# Patient Record
Sex: MALE | Race: WHITE | NOT HISPANIC OR LATINO | Employment: OTHER | RURAL
[De-identification: names, ages, dates, MRNs, and addresses within clinical notes are randomized per-mention and may not be internally consistent; named-entity substitution may affect disease eponyms.]

---

## 2017-08-17 RX ORDER — ATORVASTATIN CALCIUM 10 MG/1
10 TABLET, FILM COATED ORAL NIGHTLY
Qty: 30 TABLET | Refills: 1 | Status: SHIPPED | OUTPATIENT
Start: 2017-08-17 | End: 2017-10-23 | Stop reason: SDUPTHER

## 2017-08-24 RX ORDER — METOPROLOL TARTRATE 50 MG/1
50 TABLET, FILM COATED ORAL 2 TIMES DAILY
Qty: 60 TABLET | Refills: 1 | Status: SHIPPED | OUTPATIENT
Start: 2017-08-24 | End: 2017-11-02 | Stop reason: SDUPTHER

## 2017-09-15 ENCOUNTER — OFFICE VISIT (OUTPATIENT)
Dept: FAMILY MEDICINE CLINIC | Facility: CLINIC | Age: 77
End: 2017-09-15

## 2017-09-15 VITALS
HEART RATE: 55 BPM | HEIGHT: 74 IN | TEMPERATURE: 97.6 F | SYSTOLIC BLOOD PRESSURE: 120 MMHG | BODY MASS INDEX: 24.2 KG/M2 | OXYGEN SATURATION: 99 % | WEIGHT: 188.6 LBS | DIASTOLIC BLOOD PRESSURE: 62 MMHG

## 2017-09-15 DIAGNOSIS — Z12.12 SCREENING FOR MALIGNANT NEOPLASM OF THE RECTUM: ICD-10-CM

## 2017-09-15 DIAGNOSIS — Z23 NEED FOR PROPHYLACTIC VACCINATION AGAINST STREPTOCOCCUS PNEUMONIAE (PNEUMOCOCCUS): ICD-10-CM

## 2017-09-15 DIAGNOSIS — R53.83 FATIGUE, UNSPECIFIED TYPE: ICD-10-CM

## 2017-09-15 DIAGNOSIS — E55.9 UNSPECIFIED VITAMIN D DEFICIENCY: ICD-10-CM

## 2017-09-15 DIAGNOSIS — E78.2 MIXED HYPERLIPIDEMIA: Primary | ICD-10-CM

## 2017-09-15 DIAGNOSIS — Z00.00 ANNUAL PHYSICAL EXAM: ICD-10-CM

## 2017-09-15 DIAGNOSIS — Z23 NEED FOR INFLUENZA VACCINATION: ICD-10-CM

## 2017-09-15 DIAGNOSIS — Z23 NEED FOR PROPHYLACTIC VACCINATION WITH COMBINED DIPHTHERIA-TETANUS-PERTUSSIS (DTP) VACCINE: ICD-10-CM

## 2017-09-15 DIAGNOSIS — C61 PROSTATE CANCER (HCC): ICD-10-CM

## 2017-09-15 LAB
BILIRUB BLD-MCNC: NEGATIVE MG/DL
CLARITY, POC: CLEAR
COLOR UR: YELLOW
GLUCOSE UR STRIP-MCNC: NEGATIVE MG/DL
KETONES UR QL: NEGATIVE
LEUKOCYTE EST, POC: NEGATIVE
NITRITE UR-MCNC: POSITIVE MG/ML
PH UR: 5.5 [PH] (ref 5–8)
PROT UR STRIP-MCNC: NEGATIVE MG/DL
RBC # UR STRIP: NEGATIVE /UL
SP GR UR: 1 (ref 1–1.03)
UROBILINOGEN UR QL: NORMAL

## 2017-09-15 PROCEDURE — 90471 IMMUNIZATION ADMIN: CPT | Performed by: FAMILY MEDICINE

## 2017-09-15 PROCEDURE — 90472 IMMUNIZATION ADMIN EACH ADD: CPT | Performed by: FAMILY MEDICINE

## 2017-09-15 PROCEDURE — 90662 IIV NO PRSV INCREASED AG IM: CPT | Performed by: FAMILY MEDICINE

## 2017-09-15 PROCEDURE — 90715 TDAP VACCINE 7 YRS/> IM: CPT | Performed by: FAMILY MEDICINE

## 2017-09-15 PROCEDURE — 81003 URINALYSIS AUTO W/O SCOPE: CPT | Performed by: FAMILY MEDICINE

## 2017-09-15 PROCEDURE — G0439 PPPS, SUBSEQ VISIT: HCPCS | Performed by: FAMILY MEDICINE

## 2017-09-15 RX ORDER — INDAPAMIDE 1.25 MG/1
1.25 TABLET, FILM COATED ORAL EVERY MORNING
COMMUNITY
End: 2017-09-18 | Stop reason: SDUPTHER

## 2017-09-15 RX ORDER — CHOLECALCIFEROL (VITAMIN D3) 125 MCG
2000 CAPSULE ORAL 2 TIMES DAILY
COMMUNITY
End: 2019-04-18

## 2017-09-15 RX ORDER — LISINOPRIL 20 MG/1
20 TABLET ORAL 2 TIMES DAILY
COMMUNITY
End: 2017-10-06 | Stop reason: SDUPTHER

## 2017-09-15 NOTE — PROGRESS NOTES
QUICK REFERENCE INFORMATION:  The ABCs of the Annual Wellness Visit    Subsequent Medicare Wellness Visit    HEALTH RISK ASSESSMENT    1940    Recent Hospitalizations:  No hospitalization(s) within the last year..        Current Medical Providers:  Patient Care Team:  Ben Mae MD as PCP - General (Family Medicine)  hCeli Hernandez MD (Urology)  Alexander Wilhelm MD (Dermatology)  Nick Aranda MD as Consulting Physician (Cardiology)  Farrukh SALVADOR MD as Consulting Physician (Hematology and Oncology)        Smoking Status:  History   Smoking Status   • Former Smoker   Smokeless Tobacco   • Never Used     Comment: Quit 20 years ago       Alcohol Consumption:  History   Alcohol Use   • Yes     Comment: occ       Depression Screen:   PHQ-2/PHQ-9 Depression Screening 9/15/2017   Little interest or pleasure in doing things 0   Feeling down, depressed, or hopeless 0   Total Score 0       Health Habits and Functional and Cognitive Screening:  Functional & Cognitive Status 9/15/2017   Do you have difficulty preparing food and eating? No   Do you have difficulty bathing yourself? No   Do you have difficulty getting dressed? No   Do you have difficulty using the toilet? No   Do you have difficulty moving around from place to place? No   In the past year have you fallen or experienced a near fall? No   Do you need help using the phone?  No   Are you deaf or do you have serious difficulty hearing?  No   Do you need help with transportation? No   Do you need help shopping? No   Do you need help preparing meals?  No   Do you need help with housework?  No   Do you need help taking your medications? No   Do you need help managing money? No   -- The Timed Up and Go (TUG) Test (CDC Version)                  - Testing directions adhered to:                                  1) Patients wears regular footwear and may use walking aid(s) if    needed.                                  2) Patient to sit back in standard  "arm chair and identify a line 10 feet    away from patient on floor.                                  3) Test time begins at \"Go\" and stops when patient reseated in arm    chair.                    - Instructions read aloud (and demonstrated as applicable) to patient:                                      When I say \"Go,\" I want you to:                                    1) Stand up from the chair.                                  2) Walk to the line on the floor (10 feet away) at your normal pace.                                  3) Turn.                                  4) Walk back to the chair at your normal pace.                                  5) Sit down again.                    - Result: 3                    - Observations during test:Slow tentative gait          Health Habits  Current Diet: Well Balanced Diet  Dental Exam: Up to date  Eye Exam: Up to date  Exercise (times per week): 4 times per week  Current Exercise Activities Include: Walking      Does the patient have evidence of cognitive impairment? No    Aspirin use counseling: Does not need ASA (and currently is not on it)      Recent Lab Results:  CMP:     Lipid Panel:     HbA1c:       Visual Acuity:  Right eye 20/20 and Left eye 20/13 w/o glasses.    Age-appropriate Screening Schedule:  Refer to the list below for future screening recommendations based on patient's age, sex and/or medical conditions. Orders for these recommended tests are listed in the plan section. The patient has been provided with a written plan.    Health Maintenance   Topic Date Due   • TDAP/TD VACCINES (1 - Tdap) 04/18/1959   • INFLUENZA VACCINE  08/01/2017   • LIPID PANEL  09/15/2017   • PNEUMOCOCCAL VACCINES (65+ LOW/MEDIUM RISK)  Addressed   • ZOSTER VACCINE  Completed        Subjective   History of Present Illness    Antony Duenas is a 77 y.o. male who presents for an Subsequent Wellness Visit.    The following portions of the patient's history were reviewed and updated " as appropriate: allergies, current medications, past family history, past medical history, past social history, past surgical history and problem list.    Outpatient Medications Prior to Visit   Medication Sig Dispense Refill   • atorvastatin (LIPITOR) 10 MG tablet Take 1 tablet by mouth Every Night. 30 tablet 1   • metoprolol tartrate (LOPRESSOR) 50 MG tablet Take 1 tablet by mouth 2 (Two) Times a Day. 60 tablet 1     No facility-administered medications prior to visit.        There is no problem list on file for this patient.      Advance Care Planning:  has NO advance directive - information provided to the patient today    Identification of Risk Factors:  Risk factors include: weight , unhealthy diet, cardiovascular risk, inactivity and increased fall risk.    Review of Systems   Cardiovascular:        Sees cardiologist regularly   Gastrointestinal:        -Up-to-date on colonoscopy   Genitourinary:        Has constant urinary incontinence-needs penile clamp   Neurological:        Talk to about memory issues wellness dated gone over   All other systems reviewed and are negative.      Compared to one year ago, the patient feels his physical health is the same.  Compared to one year ago, the patient feels his mental health is the same.    Objective     Physical Exam   Constitutional: He is oriented to person, place, and time. He appears well-developed and well-nourished.   HENT:   Right Ear: External ear normal.   Left Ear: External ear normal.   Mouth/Throat: Oropharynx is clear and moist.   Eyes: EOM are normal. Pupils are equal, round, and reactive to light.   Neck: No thyromegaly present.   Cardiovascular: Normal rate and regular rhythm.    Pulmonary/Chest: Effort normal and breath sounds normal.   Abdominal: Soft. Bowel sounds are normal.   Stoma left lower quadrant health   Genitourinary:   Genitourinary Comments: Examined by urologist at [facility]   Musculoskeletal: He exhibits edema.   Stents-left  "leg-start using support hose-see podiatrist for nails   Lymphadenopathy:     He has no cervical adenopathy.   Neurological: He is alert and oriented to person, place, and time.   Skin: Skin is warm and dry.   Psychiatric: He has a normal mood and affect. His behavior is normal. Judgment and thought content normal.   Nursing note and vitals reviewed.      Vitals:    09/15/17 0935   BP: 120/62   BP Location: Right arm   Patient Position: Sitting   Cuff Size: Adult   Pulse: 55   Temp: 97.6 °F (36.4 °C)   TempSrc: Oral   SpO2: 99%   Weight: 188 lb 9.6 oz (85.5 kg)   Height: 74\" (188 cm)   PainSc: 0-No pain       Body mass index is 24.21 kg/(m^2).  Discussed the patient's BMI with him. The BMI is in the acceptable range.    Assessment/Plan   Patient Self-Management and Personalized Health Advice  The patient has been provided with information about: diet, exercise, prevention of cardiac or vascular disease, fall prevention and mental health concerns and preventive services including:   · Advance directive, Colorectal cancer screening, fecal occult blood test, Counseling for cardiovascular disease risk reduction, Fall Risk assessment done, Influenza vaccine, TdaP vaccine.    Visit Diagnoses:    ICD-10-CM ICD-9-CM   1. Mixed hyperlipidemia E78.2 272.2   2. Fatigue, unspecified type R53.83 780.79   3. Prostate cancer C61 185   4. Unspecified vitamin D deficiency E55.9 268.9   5. Screening for malignant neoplasm of the rectum Z12.12 V76.41   6. Need for prophylactic vaccination against Streptococcus pneumoniae (pneumococcus) Z23 V03.82   7. Need for prophylactic vaccination with combined diphtheria-tetanus-pertussis (DTP) vaccine Z23 V06.1   8. Annual physical exam Z00.00 V70.0       No orders of the defined types were placed in this encounter.      Outpatient Encounter Prescriptions as of 9/15/2017   Medication Sig Dispense Refill   • atorvastatin (LIPITOR) 10 MG tablet Take 1 tablet by mouth Every Night. 30 tablet 1   • " Cholecalciferol (VITAMIN D3) 2000 units tablet Take 2,000 Units by mouth 2 (Two) Times a Day.     • indapamide (LOZOL) 1.25 MG tablet Take 1.25 mg by mouth Every Morning.     • lisinopril (PRINIVIL,ZESTRIL) 20 MG tablet Take 20 mg by mouth 2 (Two) Times a Day.     • metoprolol tartrate (LOPRESSOR) 50 MG tablet Take 1 tablet by mouth 2 (Two) Times a Day. 60 tablet 1     No facility-administered encounter medications on file as of 9/15/2017.        Reviewed use of high risk medication in the elderly: yes  Reviewed for potential of harmful drug interactions in the elderly: yes    Follow Up:  Return in 6 months (on 3/15/2018).     An After Visit Summary and PPPS with all of these plans were given to the patient.      memory issues okay-see in 6 months many MR, clock test etc. if exercise etc. etc. does not work

## 2017-09-15 NOTE — PATIENT INSTRUCTIONS
Exercising to Stay Healthy  Exercising regularly is important. It has many health benefits, such as:  · Improving your overall fitness, flexibility, and endurance.  · Increasing your bone density.  · Helping with weight control.  · Decreasing your body fat.  · Increasing your muscle strength.  · Reducing stress and tension.  · Improving your overall health.  In order to become healthy and stay healthy, it is recommended that you do moderate-intensity and vigorous-intensity exercise. You can tell that you are exercising at a moderate intensity if you have a higher heart rate and faster breathing, but you are still able to hold a conversation. You can tell that you are exercising at a vigorous intensity if you are breathing much harder and faster and cannot hold a conversation while exercising.  HOW OFTEN SHOULD I EXERCISE?  Choose an activity that you enjoy and set realistic goals. Your health care provider can help you to make an activity plan that works for you. Exercise regularly as directed by your health care provider. This may include:   · Doing resistance training twice each week, such as:    Push-ups.    Sit-ups.    Lifting weights.    Using resistance bands.  · Doing a given intensity of exercise for a given amount of time. Choose from these options:    150 minutes of moderate-intensity exercise every week.    75 minutes of vigorous-intensity exercise every week.    A mix of moderate-intensity and vigorous-intensity exercise every week.  Children, pregnant women, people who are out of shape, people who are overweight, and older adults may need to consult a health care provider for individual recommendations. If you have any sort of medical condition, be sure to consult your health care provider before starting a new exercise program.   WHAT ARE SOME EXERCISE IDEAS?  Some moderate-intensity exercise ideas include:   · Walking at a rate of 1 mile in 15  minutes.  · Biking.  · Hiking.  · Golfing.  · Dancing.  Some vigorous-intensity exercise ideas include:   · Walking at a rate of at least 4.5 miles per hour.  · Jogging or running at a rate of 5 miles per hour.  · Biking at a rate of at least 10 miles per hour.  · Lap swimming.  · Roller-skating or in-line skating.  · Cross-country skiing.  · Vigorous competitive sports, such as football, basketball, and soccer.  · Jumping rope.  · Aerobic dancing.  WHAT ARE SOME EVERYDAY ACTIVITIES THAT CAN HELP ME TO GET EXERCISE?  · Yard work, such as:    Pushing a .    Raking and bagging leaves.  · Washing and waxing your car.  · Pushing a stroller.  · Shoveling snow.  · Gardening.  · Washing windows or floors.  HOW CAN I BE MORE ACTIVE IN MY DAY-TO-DAY ACTIVITIES?  · Use the stairs instead of the elevator.  · Take a walk during your lunch break.  · If you drive, park your car farther away from work or school.  · If you take public transportation, get off one stop early and walk the rest of the way.  · Make all of your phone calls while standing up and walking around.  · Get up, stretch, and walk around every 30 minutes throughout the day.  WHAT GUIDELINES SHOULD I FOLLOW WHILE EXERCISING?  · Do not exercise so much that you hurt yourself, feel dizzy, or get very short of breath.  · Consult your health care provider before starting a new exercise program.  · Wear comfortable clothes and shoes with good support.  · Drink plenty of water while you exercise to prevent dehydration or heat stroke. Body water is lost during exercise and must be replaced.  · Work out until you breathe faster and your heart beats faster.     This information is not intended to replace advice given to you by your health care provider. Make sure you discuss any questions you have with your health care provider.     Document Released: 01/20/2012 Document Revised: 01/08/2016 Document Reviewed: 05/21/2015  [employer] Patient Education  ©2017 [employer]    Fall Prevention in the Home   Falls can cause injuries and can affect people from all age groups. There are many simple things that you can do to make your home safe and to help prevent falls.  WHAT CAN I DO ON THE OUTSIDE OF MY HOME?  · Regularly repair the edges of walkways and driveways and fix any cracks.  · Remove high doorway thresholds.  · Trim any shrubbery on the main path into your home.  · Use bright outdoor lighting.  · Clear walkways of debris and clutter, including tools and rocks.  · Regularly check that handrails are securely fastened and in good repair. Both sides of any steps should have handrails.  · Install guardrails along the edges of any raised decks or porches.  · Have leaves, snow, and ice cleared regularly.  · Use sand or salt on walkways during winter months.  · In the garage, clean up any spills right away, including grease or oil spills.  WHAT CAN I DO IN THE BATHROOM?  · Use night lights.  · Install grab bars by the toilet and in the tub and shower. Do not use towel bars as grab bars.  · Use non-skid mats or decals on the floor of the tub or shower.  · If you need to sit down while you are in the shower, use a plastic, non-slip stool.  · Keep the floor dry. Immediately clean up any water that spills on the floor.  · Remove soap buildup in the tub or shower on a regular basis.  · Attach bath mats securely with double-sided non-slip rug tape.  · Remove throw rugs and other tripping hazards from the floor.  WHAT CAN I DO IN THE BEDROOM?  · Use night lights.  · Make sure that a bedside light is easy to reach.  · Do not use oversized bedding that drapes onto the floor.  · Have a firm chair that has side arms to use for getting dressed.  · Remove throw rugs and other tripping hazards from the floor.  WHAT CAN I DO IN THE KITCHEN?   · Clean up any spills right away.  · Avoid walking on wet floors.  · Place frequently used items in easy-to-reach places.  · If you need to  reach for something above you, use a sturdy step stool that has a grab bar.  · Keep electrical cables out of the way.  · Do not use floor polish or wax that makes floors slippery. If you have to use wax, make sure that it is non-skid floor wax.  · Remove throw rugs and other tripping hazards from the floor.  WHAT CAN I DO IN THE STAIRWAYS?  · Do not leave any items on the stairs.  · Make sure that there are handrails on both sides of the stairs. Fix handrails that are broken or loose. Make sure that handrails are as long as the stairways.  · Check any carpeting to make sure that it is firmly attached to the stairs. Fix any carpet that is loose or worn.  · Avoid having throw rugs at the top or bottom of stairways, or secure the rugs with carpet tape to prevent them from moving.  · Make sure that you have a light switch at the top of the stairs and the bottom of the stairs. If you do not have them, have them installed.  WHAT ARE SOME OTHER FALL PREVENTION TIPS?  · Wear closed-toe shoes that fit well and support your feet. Wear shoes that have rubber soles or low heels.  · When you use a stepladder, make sure that it is completely opened and that the sides are firmly locked. Have someone hold the ladder while you are using it. Do not climb a closed stepladder.  · Add color or contrast paint or tape to grab bars and handrails in your home. Place contrasting color strips on the first and last steps.  · Use mobility aids as needed, such as canes, walkers, scooters, and crutches.  · Turn on lights if it is dark. Replace any light bulbs that burn out.  · Set up furniture so that there are clear paths. Keep the furniture in the same spot.  · Fix any uneven floor surfaces.  · Choose a carpet design that does not hide the edge of steps of a stairway.  · Be aware of any and all pets.  · Review your medicines with your healthcare provider. Some medicines can cause dizziness or changes in blood pressure, which increase your risk  of falling.  Talk with your health care provider about other ways that you can decrease your risk of falls. This may include working with a physical therapist or  to improve your strength, balance, and endurance.     This information is not intended to replace advice given to you by your health care provider. Make sure you discuss any questions you have with your health care provider.     Document Released: 2003 Document Revised: 04/10/2017 Document Reviewed: 2016  [employer] Interactive Patient Education © [employer]    Medicare Wellness  Personal Prevention Plan of Service     Date of Office Visit:  09/15/2017  Encounter Provider:  Ben Mae MD  Place of Service:  [facility]  Patient Name: Antony Duenas  :  1940    As part of the Medicare Wellness portion of your visit today, we are providing you with this personalized preventive plan of services (PPPS). This plan is based upon recommendations of the [place] Preventive Services Task Force (USPSTF) and the Advisory Committee on Immunization Practices (ACIP).    This lists the preventive care services that should be considered, and provides dates of when you are due. Items listed as completed are up-to-date and do not require any further intervention.    Health Maintenance   Topic Date Due   • TDAP/TD VACCINES (1 - Tdap) 1959   • PNEUMOCOCCAL VACCINES (65+ LOW/MEDIUM RISK) (2 of 2 - PCV13) 10/21/2009   • INFLUENZA VACCINE  2017   • ZOSTER VACCINE  Completed       No orders of the defined types were placed in this encounter.      Return in 6 months (on 3/15/2018).

## 2017-09-16 LAB
25(OH)D3+25(OH)D2 SERPL-MCNC: 65.9 NG/ML (ref 30–100)
ALBUMIN SERPL-MCNC: 4.4 G/DL (ref 3.5–5)
ALBUMIN/GLOB SERPL: 1.6 G/DL (ref 1.1–2.5)
ALP SERPL-CCNC: 48 U/L (ref 24–120)
ALT SERPL-CCNC: 28 U/L (ref 0–54)
AST SERPL-CCNC: 17 U/L (ref 7–45)
BASOPHILS # BLD AUTO: 0.03 10*3/MM3 (ref 0–0.2)
BASOPHILS NFR BLD AUTO: 0.5 % (ref 0–2)
BILIRUB SERPL-MCNC: 0.7 MG/DL (ref 0.1–1)
BUN SERPL-MCNC: 18 MG/DL (ref 5–21)
BUN/CREAT SERPL: 17.3 (ref 7–25)
CALCIUM SERPL-MCNC: 9.5 MG/DL (ref 8.4–10.4)
CHLORIDE SERPL-SCNC: 103 MMOL/L (ref 98–110)
CHOLEST SERPL-MCNC: 127 MG/DL (ref 130–200)
CO2 SERPL-SCNC: 26 MMOL/L (ref 24–31)
CREAT SERPL-MCNC: 1.04 MG/DL (ref 0.5–1.4)
EOSINOPHIL # BLD AUTO: 0.11 10*3/MM3 (ref 0–0.7)
EOSINOPHIL NFR BLD AUTO: 1.7 % (ref 0–4)
ERYTHROCYTE [DISTWIDTH] IN BLOOD BY AUTOMATED COUNT: 14.4 % (ref 12–15)
GLOBULIN SER CALC-MCNC: 2.7 GM/DL
GLUCOSE SERPL-MCNC: 78 MG/DL (ref 70–100)
HCT VFR BLD AUTO: 41.4 % (ref 40–52)
HDLC SERPL-MCNC: 41 MG/DL
HGB BLD-MCNC: 13.4 G/DL (ref 14–18)
IMM GRANULOCYTES # BLD: 0.01 10*3/MM3 (ref 0–0.03)
IMM GRANULOCYTES NFR BLD: 0.2 % (ref 0–5)
LDLC SERPL CALC-MCNC: 63 MG/DL (ref 0–99)
LYMPHOCYTES # BLD AUTO: 0.67 10*3/MM3 (ref 0.72–4.86)
LYMPHOCYTES NFR BLD AUTO: 10.6 % (ref 15–45)
MCH RBC QN AUTO: 31.8 PG (ref 28–32)
MCHC RBC AUTO-ENTMCNC: 32.4 G/DL (ref 33–36)
MCV RBC AUTO: 98.3 FL (ref 82–95)
MONOCYTES # BLD AUTO: 0.39 10*3/MM3 (ref 0.19–1.3)
MONOCYTES NFR BLD AUTO: 6.2 % (ref 4–12)
NEUTROPHILS # BLD AUTO: 5.1 10*3/MM3 (ref 1.87–8.4)
NEUTROPHILS NFR BLD AUTO: 80.8 % (ref 39–78)
PLATELET # BLD AUTO: 375 10*3/MM3 (ref 130–400)
POTASSIUM SERPL-SCNC: 4.9 MMOL/L (ref 3.5–5.3)
PROT SERPL-MCNC: 7.1 G/DL (ref 6.3–8.7)
RBC # BLD AUTO: 4.21 10*6/MM3 (ref 4.8–5.9)
SODIUM SERPL-SCNC: 140 MMOL/L (ref 135–145)
T4 FREE SERPL-MCNC: 1.06 NG/DL (ref 0.78–2.19)
TRIGL SERPL-MCNC: 116 MG/DL (ref 0–149)
TSH SERPL DL<=0.005 MIU/L-ACNC: 0.94 MIU/ML (ref 0.47–4.68)
VLDLC SERPL CALC-MCNC: 23.2 MG/DL
WBC # BLD AUTO: 6.31 10*3/MM3 (ref 4.8–10.8)

## 2017-09-18 RX ORDER — INDAPAMIDE 1.25 MG/1
TABLET, FILM COATED ORAL
Qty: 90 TABLET | Refills: 1 | Status: SHIPPED | OUTPATIENT
Start: 2017-09-18 | End: 2018-03-13 | Stop reason: SDUPTHER

## 2017-10-06 RX ORDER — LISINOPRIL 20 MG/1
TABLET ORAL
Qty: 180 TABLET | Refills: 3 | Status: SHIPPED | OUTPATIENT
Start: 2017-10-06 | End: 2018-11-26 | Stop reason: SDUPTHER

## 2017-10-23 RX ORDER — ATORVASTATIN CALCIUM 10 MG/1
TABLET, FILM COATED ORAL
Qty: 90 TABLET | Refills: 1 | Status: SHIPPED | OUTPATIENT
Start: 2017-10-23 | End: 2018-05-14 | Stop reason: SDUPTHER

## 2017-11-02 RX ORDER — METOPROLOL TARTRATE 50 MG/1
TABLET, FILM COATED ORAL
Qty: 180 TABLET | Refills: 1 | Status: SHIPPED | OUTPATIENT
Start: 2017-11-02 | End: 2018-05-14 | Stop reason: SDUPTHER

## 2018-02-20 ENCOUNTER — OFFICE VISIT (OUTPATIENT)
Dept: FAMILY MEDICINE CLINIC | Facility: CLINIC | Age: 78
End: 2018-02-20

## 2018-02-20 VITALS
OXYGEN SATURATION: 98 % | DIASTOLIC BLOOD PRESSURE: 68 MMHG | HEIGHT: 74 IN | TEMPERATURE: 98.1 F | HEART RATE: 53 BPM | SYSTOLIC BLOOD PRESSURE: 120 MMHG | BODY MASS INDEX: 23.74 KG/M2 | WEIGHT: 185 LBS

## 2018-02-20 DIAGNOSIS — R10.12 LEFT UPPER QUADRANT PAIN: Primary | ICD-10-CM

## 2018-02-20 PROCEDURE — 99214 OFFICE O/P EST MOD 30 MIN: CPT | Performed by: FAMILY MEDICINE

## 2018-02-20 RX ORDER — VALACYCLOVIR HYDROCHLORIDE 1 G/1
1000 TABLET, FILM COATED ORAL 3 TIMES DAILY
Qty: 21 TABLET | Refills: 0 | Status: SHIPPED | OUTPATIENT
Start: 2018-02-20 | End: 2018-04-05

## 2018-02-20 NOTE — PROGRESS NOTES
Subjective   Antony Duenas is a 77 y.o. male.     Abdominal Pain   This is a new problem. The current episode started in the past 7 days. The onset quality is sudden. The problem occurs daily. The problem has been unchanged. The pain is located in the LUQ. The pain is at a severity of 3/10. The pain is mild. The quality of the pain is colicky and a sensation of fullness. The abdominal pain does not radiate. Associated symptoms include constipation. Associated symptoms comments: Stoma and colostomy left upper quadrant-colon cancer. Exacerbated by: Constipation. The pain is relieved by nothing. He has tried nothing for the symptoms. Prior diagnostic workup includes CT scan. His past medical history is significant for abdominal surgery and colon cancer.       The following portions of the patient's history were reviewed and updated as appropriate: allergies, current medications, past family history, past medical history, past social history, past surgical history and problem list.    Review of Systems   Gastrointestinal: Positive for abdominal pain and constipation.        Stoma not working   Genitourinary:        Urinary incontinence secondary to prostate cancer   Skin:        Acute rash radicular consistent with shingles-just above stoma going around his left side       Objective   Physical Exam   Constitutional: He is oriented to person, place, and time. He appears well-developed and well-nourished.   Cardiovascular: Normal rate and regular rhythm.    Pulmonary/Chest: Effort normal and breath sounds normal.   Abdominal: He exhibits distension. There is tenderness. There is no guarding.   Neurological: He is alert and oriented to person, place, and time.   Skin: Rash noted. There is erythema.   Radicular rash about T8 on the left-.  early vesicles-consistent with shingles   Psychiatric: He has a normal mood and affect. His behavior is normal. Judgment and thought content normal.   Nursing note and vitals  reviewed.      Assessment/Plan   Antony was seen today for abdominal pain.    Diagnoses and all orders for this visit:    Left upper quadrant pain  -     CT Abdomen Pelvis Without Contrast; Future    Other orders  -     valACYclovir (VALTREX) 1000 MG tablet; Take 1 tablet by mouth 3 (Three) Times a Day.           Plan-urgent CT-lab-Valtrex 3 times a day return 1 week and schedule low-dose CT of chest

## 2018-02-21 DIAGNOSIS — R10.12 LEFT UPPER QUADRANT PAIN: ICD-10-CM

## 2018-02-21 LAB
ALBUMIN SERPL-MCNC: 3.7 G/DL (ref 3.5–5)
ALBUMIN/GLOB SERPL: 1.4 G/DL (ref 1.1–2.5)
ALP SERPL-CCNC: 47 U/L (ref 24–120)
ALT SERPL-CCNC: 34 U/L (ref 0–54)
AST SERPL-CCNC: 24 U/L (ref 7–45)
BASOPHILS # BLD AUTO: 0.04 10*3/MM3 (ref 0–0.2)
BASOPHILS NFR BLD AUTO: 1 % (ref 0–2)
BILIRUB SERPL-MCNC: 0.4 MG/DL (ref 0.1–1)
BUN SERPL-MCNC: 17 MG/DL (ref 5–21)
BUN/CREAT SERPL: 15 (ref 7–25)
CALCIUM SERPL-MCNC: 9 MG/DL (ref 8.4–10.4)
CHLORIDE SERPL-SCNC: 99 MMOL/L (ref 98–110)
CO2 SERPL-SCNC: 30 MMOL/L (ref 24–31)
CREAT SERPL-MCNC: 1.13 MG/DL (ref 0.5–1.4)
EOSINOPHIL # BLD AUTO: 0.07 10*3/MM3 (ref 0–0.7)
EOSINOPHIL NFR BLD AUTO: 1.7 % (ref 0–4)
ERYTHROCYTE [DISTWIDTH] IN BLOOD BY AUTOMATED COUNT: 15.1 % (ref 12–15)
GFR SERPLBLD CREATININE-BSD FMLA CKD-EPI: 63 ML/MIN/1.73
GFR SERPLBLD CREATININE-BSD FMLA CKD-EPI: 76 ML/MIN/1.73
GLOBULIN SER CALC-MCNC: 2.6 GM/DL
GLUCOSE SERPL-MCNC: 71 MG/DL (ref 70–100)
HCT VFR BLD AUTO: 36.9 % (ref 40–52)
HGB BLD-MCNC: 11.9 G/DL (ref 14–18)
IMM GRANULOCYTES # BLD: 0.01 10*3/MM3 (ref 0–0.03)
IMM GRANULOCYTES NFR BLD: 0.2 % (ref 0–5)
LYMPHOCYTES # BLD AUTO: 0.52 10*3/MM3 (ref 0.72–4.86)
LYMPHOCYTES NFR BLD AUTO: 12.9 % (ref 15–45)
MCH RBC QN AUTO: 30.7 PG (ref 28–32)
MCHC RBC AUTO-ENTMCNC: 32.2 G/DL (ref 33–36)
MCV RBC AUTO: 95.1 FL (ref 82–95)
MONOCYTES # BLD AUTO: 0.44 10*3/MM3 (ref 0.19–1.3)
MONOCYTES NFR BLD AUTO: 10.9 % (ref 4–12)
NEUTROPHILS # BLD AUTO: 2.96 10*3/MM3 (ref 1.87–8.4)
NEUTROPHILS NFR BLD AUTO: 73.3 % (ref 39–78)
NRBC BLD AUTO-RTO: 0 /100 WBC (ref 0–0)
PLATELET # BLD AUTO: 303 10*3/MM3 (ref 130–400)
POTASSIUM SERPL-SCNC: 4.4 MMOL/L (ref 3.5–5.3)
PROT SERPL-MCNC: 6.3 G/DL (ref 6.3–8.7)
RBC # BLD AUTO: 3.88 10*6/MM3 (ref 4.8–5.9)
SODIUM SERPL-SCNC: 138 MMOL/L (ref 135–145)
WBC # BLD AUTO: 4.04 10*3/MM3 (ref 4.8–10.8)

## 2018-02-28 ENCOUNTER — OFFICE VISIT (OUTPATIENT)
Dept: FAMILY MEDICINE CLINIC | Facility: CLINIC | Age: 78
End: 2018-02-28

## 2018-02-28 VITALS
SYSTOLIC BLOOD PRESSURE: 116 MMHG | HEART RATE: 68 BPM | WEIGHT: 173.4 LBS | TEMPERATURE: 98.2 F | DIASTOLIC BLOOD PRESSURE: 70 MMHG | BODY MASS INDEX: 22.25 KG/M2 | OXYGEN SATURATION: 97 % | HEIGHT: 74 IN

## 2018-02-28 DIAGNOSIS — B02.9 HERPES ZOSTER WITHOUT COMPLICATION: Primary | ICD-10-CM

## 2018-02-28 PROCEDURE — 99213 OFFICE O/P EST LOW 20 MIN: CPT | Performed by: FAMILY MEDICINE

## 2018-02-28 RX ORDER — GABAPENTIN 100 MG/1
CAPSULE ORAL
Qty: 90 CAPSULE | Refills: 5 | Status: SHIPPED | OUTPATIENT
Start: 2018-02-28 | End: 2019-04-18

## 2018-02-28 NOTE — PROGRESS NOTES
Subjective   Antony Duenas is a 77 y.o. male.     Chest Pain    This is a new problem. The current episode started 1 to 4 weeks ago. The onset quality is sudden. The problem occurs daily. The problem has been gradually improving. The pain is present in the lateral region. Associated symptoms comments: Acute shingles left chest wall. The pain is aggravated by breathing. Treatments tried: Finish Valtrex-starting gabapentin. There are no known risk factors.   His past medical history is significant for cancer.       The following portions of the patient's history were reviewed and updated as appropriate: allergies, current medications, past family history, past medical history, past social history, past surgical history and problem list.    Review of Systems   Cardiovascular: Positive for chest pain.   Gastrointestinal: Positive for diarrhea.   Skin:        Shingles rash left chest wall around T8       Objective   Physical Exam   Constitutional: He appears well-developed and well-nourished.   Abdominal: Soft. He exhibits no distension. There is no tenderness.   Diarrhea via stoma   Neurological: He is alert.   Skin: Rash noted. There is erythema.   Vesicular rash left chest wall drying up consistent with shingles   Psychiatric: He has a normal mood and affect. His behavior is normal.   Vitals reviewed.      Assessment/Plan   Antony was seen today for follow-up.    Diagnoses and all orders for this visit:    Herpes zoster without complication    Other orders  -     gabapentin (NEURONTIN) 100 MG capsule; 1-3 every 4-6 hours when necessary shingles pain     Plan-start gabapentin, see back one week, stool for C. difficile            CONTROLLED SUBSTANCE TRACKING 2/28/2018   Last [place] 2/28/2018   Last Controlled Substance Agreement 2/28/2018

## 2018-03-07 ENCOUNTER — OFFICE VISIT (OUTPATIENT)
Dept: FAMILY MEDICINE CLINIC | Facility: CLINIC | Age: 78
End: 2018-03-07

## 2018-03-07 VITALS
HEART RATE: 68 BPM | SYSTOLIC BLOOD PRESSURE: 102 MMHG | WEIGHT: 173 LBS | DIASTOLIC BLOOD PRESSURE: 62 MMHG | OXYGEN SATURATION: 97 % | HEIGHT: 74 IN | BODY MASS INDEX: 22.2 KG/M2 | TEMPERATURE: 97.7 F

## 2018-03-07 DIAGNOSIS — B02.9 HERPES ZOSTER WITHOUT COMPLICATION: Primary | ICD-10-CM

## 2018-03-07 PROCEDURE — 99213 OFFICE O/P EST LOW 20 MIN: CPT | Performed by: FAMILY MEDICINE

## 2018-03-07 NOTE — PROGRESS NOTES
Subjective   Antony Duenas is a 77 y.o. male.     Chest Pain    This is a new problem. The current episode started 1 to 4 weeks ago. The onset quality is sudden. The problem occurs daily. The problem has been gradually improving. The pain is present in the lateral region. The pain is mild. The quality of the pain is described as stabbing. The pain does not radiate. Associated symptoms comments: Acute shingles T8 left resolving. The pain is aggravated by nothing. Treatments tried: Valtrex. The treatment provided moderate relief. There are no known risk factors.   His past medical history is significant for cancer.       The following portions of the patient's history were reviewed and updated as appropriate: allergies, current medications, past family history, past medical history, past social history, past surgical history and problem list.    Review of Systems   Cardiovascular: Positive for chest pain.   Skin:        Rash resolving shingles left       Objective   Physical Exam   Constitutional: He is oriented to person, place, and time. He appears well-developed and well-nourished.   Abdominal:   Stoma functioning more normal   Neurological: He is alert and oriented to person, place, and time.   Skin: Rash noted. There is erythema. There is pallor.   Resolving shingles rash on left chest   Psychiatric: He has a normal mood and affect. His behavior is normal. Thought content normal.   Nursing note and vitals reviewed.      Assessment/Plan   Antony was seen today for follow-up.    Diagnoses and all orders for this visit:    Herpes zoster without complication       Plan-continue return 1 month-continue gabapentin 2-3 a day-schedule CT of chest on return when shingles clears

## 2018-03-13 RX ORDER — INDAPAMIDE 1.25 MG/1
TABLET, FILM COATED ORAL
Qty: 90 TABLET | Refills: 1 | Status: SHIPPED | OUTPATIENT
Start: 2018-03-13 | End: 2019-05-24 | Stop reason: SDUPTHER

## 2018-04-05 ENCOUNTER — OFFICE VISIT (OUTPATIENT)
Dept: FAMILY MEDICINE CLINIC | Facility: CLINIC | Age: 78
End: 2018-04-05

## 2018-04-05 VITALS
DIASTOLIC BLOOD PRESSURE: 66 MMHG | TEMPERATURE: 97.6 F | HEIGHT: 74 IN | BODY MASS INDEX: 22.51 KG/M2 | SYSTOLIC BLOOD PRESSURE: 106 MMHG | WEIGHT: 175.4 LBS

## 2018-04-05 DIAGNOSIS — Z12.2 ENCOUNTER FOR SCREENING FOR LUNG CANCER: ICD-10-CM

## 2018-04-05 DIAGNOSIS — Z87.891 HISTORY OF NICOTINE DEPENDENCE: ICD-10-CM

## 2018-04-05 PROCEDURE — 99214 OFFICE O/P EST MOD 30 MIN: CPT | Performed by: FAMILY MEDICINE

## 2018-04-05 PROCEDURE — 99406 BEHAV CHNG SMOKING 3-10 MIN: CPT | Performed by: FAMILY MEDICINE

## 2018-04-05 NOTE — PROGRESS NOTES
"   Low-Dose Lung Cancer CT Screening Visit    CHIEF COMPLAINT:    Shared Decision Making  I am discussing tobacco cessation today with Antony Duenas    SMOKING HISTORY:     History   Smoking Status   • Former Smoker   • Packs/day: 1.00   • Years: 50.00   • Types: Cigarettes   • Quit date: 2/20/2007   Smokeless Tobacco   • Never Used       SUBJECTIVE:     Antony Duenas is a former smoker quitting 11 years ago with a  40  pack year history.  he reports no use of alternate forms of tobacco, electronic cigarettes, marijuana or other substances.  Based on the recommendation of the [place] Preventive Services Task Force, this patient is at high risk for lung cancer and a low-dose CT screening scan is recommended.     The patient has had no hemoptysis, unintentional weight loss or increasing shortness of breath. The patient is asymptomatic and has no signs or symptoms of lung cancer.     Together we discussed the potential benefits and potential harms of being screened for lung cancer including the potential for follow up diagnostic testing, risk for over diagnosis, false positive rate and radiation exposure using the AHRQ: Is Lung Cancer Screening Right for Me Decision Aid (Publication 09-[id number], web site www.ahrq.gov). A copy of this decision aid resource has been provided in the after visit summary.  We also reviewed the patient's smoking history and counseled him on the importance and health benefits of maintaining cigarette smoking abstinence.      OBJECTIVE:    /66 (BP Location: Left arm, Patient Position: Sitting, Cuff Size: Adult)   Temp 97.6 °F (36.4 °C) (Oral)   Ht 188 cm (74.02\")   Wt 79.6 kg (175 lb 6.4 oz)   BMI 22.51 kg/m²   General: no distress, alert and oriented  Chest: Lung sounds are clear to auscultation, no wheezes, rales or rhonchi, with symmetric air entry. No respiratory distress  Cardiovascular: RRR with no murmur auscultated      Continued Smoking Abstinence discussion: "     We discussed that there are a number of resources and interventions to assist with smoking cessation if needed in the future including the 1-800-Quit Now line.(Included in the decision aid shared with the patient today).   On a scale of zero to ten, the patient rates their motivation to stay quit at a 10 out of 10 today.  The patient is confident that they will never smoke in the future.    Recommendations for continued lung cancer screening:      We discussed the NCCN guidelines for lung cancer screening and the patient verbalized understanding that annual screening is recommended until fifteen years beyond smoking as long as they have no other disease or comorbidity that would prevent them from receiving cancer treatments such as surgery should a lung cancer be detected.  After review of the NCCN guidelines and recommendations for ongoing screening, the patient verbalized understanding of recommendations for follow-up.  The patient has decided to proceed with a Low Dose Lung Cancer Screening CT today.       5minutes face-to-face spent counseling patient on the continued health benefits of having quit tobacco, maintaining smoking abstinence, smoking cessation strategies and resources, as well as the importance of adherence to annual lung cancer low-dose CT screening.

## 2018-04-05 NOTE — PROGRESS NOTES
Subjective   Antony Duenas is a 77 y.o. male.     Chest Pain    This is a new problem. The current episode started more than 1 month ago. The onset quality is gradual. The problem occurs daily. The problem has been resolved. The pain is present in the lateral region. The pain is moderate. The quality of the pain is described as burning and sharp. The pain does not radiate. Associated symptoms include abdominal pain. The pain is aggravated by nothing. Treatments tried: Zovirax and gabapentin. The treatment provided moderate relief.       The following portions of the patient's history were reviewed and updated as appropriate: allergies, current medications, past family history, past medical history, past social history, past surgical history and problem list.    Review of Systems   Cardiovascular: Positive for chest pain.   Gastrointestinal: Positive for abdominal pain.   Skin:        Rash left thorax is trying-stoma is working       Objective   Physical Exam   Constitutional: He is oriented to person, place, and time. He appears well-developed and well-nourished.   Cardiovascular: Normal rate and regular rhythm.    Pulmonary/Chest: Effort normal and breath sounds normal.   Neurological: He is alert and oriented to person, place, and time.   Skin:   Rash cleared   Psychiatric: He has a normal mood and affect. His behavior is normal.   Nursing note and vitals reviewed.      Assessment/Plan   Antony was seen today for follow-up and follow-up.    Diagnoses and all orders for this visit:    Encounter for screening for lung cancer  -     CT chest low dose wo; Future    History of nicotine dependence  -     CT chest low dose wo; Future           Advised to get the new shingles shot in another month

## 2018-04-16 ENCOUNTER — HOSPITAL ENCOUNTER (OUTPATIENT)
Dept: CT IMAGING | Facility: HOSPITAL | Age: 78
Discharge: HOME | End: 2018-04-16
Admitting: FAMILY MEDICINE

## 2018-04-16 DIAGNOSIS — Z87.891 HISTORY OF NICOTINE DEPENDENCE: ICD-10-CM

## 2018-04-16 DIAGNOSIS — Z12.2 ENCOUNTER FOR SCREENING FOR LUNG CANCER: ICD-10-CM

## 2018-04-16 PROBLEM — K80.20 CHOLELITHIASIS: Status: ACTIVE | Noted: 2018-04-16

## 2018-04-16 PROCEDURE — G0297 LDCT FOR LUNG CA SCREEN: HCPCS

## 2018-04-27 ENCOUNTER — DOCUMENTATION (OUTPATIENT)
Dept: RADIATION ONCOLOGY | Facility: HOSPITAL | Age: 78
End: 2018-04-27

## 2018-04-27 NOTE — PROGRESS NOTES
Alexandra with Dr. Mae notified of Category 2 results of low dose CT chest and recommendation per radiologist for a repeat annual screening.

## 2018-05-14 RX ORDER — METOPROLOL TARTRATE 50 MG/1
TABLET, FILM COATED ORAL
Qty: 180 TABLET | Refills: 3 | Status: ON HOLD | OUTPATIENT
Start: 2018-05-14 | End: 2019-08-11

## 2018-05-14 RX ORDER — ATORVASTATIN CALCIUM 10 MG/1
TABLET, FILM COATED ORAL
Qty: 90 TABLET | Refills: 3 | Status: SHIPPED | OUTPATIENT
Start: 2018-05-14 | End: 2019-07-22 | Stop reason: SDUPTHER

## 2018-11-26 RX ORDER — LISINOPRIL 20 MG/1
TABLET ORAL
Qty: 180 TABLET | Refills: 0 | Status: SHIPPED | OUTPATIENT
Start: 2018-11-26 | End: 2019-04-08 | Stop reason: SDUPTHER

## 2019-01-01 ENCOUNTER — TELEPHONE (OUTPATIENT)
Dept: VASCULAR SURGERY | Facility: CLINIC | Age: 79
End: 2019-01-01

## 2019-01-01 ENCOUNTER — OFFICE VISIT (OUTPATIENT)
Dept: FAMILY MEDICINE CLINIC | Facility: CLINIC | Age: 79
End: 2019-01-01

## 2019-01-01 VITALS
WEIGHT: 178.8 LBS | SYSTOLIC BLOOD PRESSURE: 129 MMHG | HEART RATE: 98 BPM | DIASTOLIC BLOOD PRESSURE: 71 MMHG | BODY MASS INDEX: 22.96 KG/M2 | TEMPERATURE: 97.6 F | OXYGEN SATURATION: 98 %

## 2019-01-01 DIAGNOSIS — R53.83 FATIGUE, UNSPECIFIED TYPE: Primary | ICD-10-CM

## 2019-01-01 DIAGNOSIS — I10 HYPERTENSION, UNSPECIFIED TYPE: ICD-10-CM

## 2019-01-01 LAB
BASOPHILS # BLD AUTO: 0.07 10*3/MM3 (ref 0–0.2)
BASOPHILS NFR BLD AUTO: 1 % (ref 0–1.5)
BUN SERPL-MCNC: 11 MG/DL (ref 8–23)
BUN/CREAT SERPL: 11.2 (ref 7–25)
CALCIUM SERPL-MCNC: 9.6 MG/DL (ref 8.6–10.5)
CHLORIDE SERPL-SCNC: 96 MMOL/L (ref 98–107)
CO2 SERPL-SCNC: 28.9 MMOL/L (ref 22–29)
CREAT SERPL-MCNC: 0.98 MG/DL (ref 0.76–1.27)
EOSINOPHIL # BLD AUTO: 0.13 10*3/MM3 (ref 0–0.4)
EOSINOPHIL NFR BLD AUTO: 1.8 % (ref 0.3–6.2)
ERYTHROCYTE [DISTWIDTH] IN BLOOD BY AUTOMATED COUNT: 14.4 % (ref 12.3–15.4)
GLUCOSE SERPL-MCNC: 60 MG/DL (ref 65–99)
HCT VFR BLD AUTO: 37.2 % (ref 37.5–51)
HGB BLD-MCNC: 13.1 G/DL (ref 13–17.7)
IMM GRANULOCYTES # BLD AUTO: 0.04 10*3/MM3 (ref 0–0.05)
IMM GRANULOCYTES NFR BLD AUTO: 0.6 % (ref 0–0.5)
LYMPHOCYTES # BLD AUTO: 0.76 10*3/MM3 (ref 0.7–3.1)
LYMPHOCYTES NFR BLD AUTO: 10.6 % (ref 19.6–45.3)
MCH RBC QN AUTO: 31.3 PG (ref 26.6–33)
MCHC RBC AUTO-ENTMCNC: 35.2 G/DL (ref 31.5–35.7)
MCV RBC AUTO: 89 FL (ref 79–97)
MONOCYTES # BLD AUTO: 0.49 10*3/MM3 (ref 0.1–0.9)
MONOCYTES NFR BLD AUTO: 6.8 % (ref 5–12)
NEUTROPHILS # BLD AUTO: 5.67 10*3/MM3 (ref 1.7–7)
NEUTROPHILS NFR BLD AUTO: 79.2 % (ref 42.7–76)
NRBC BLD AUTO-RTO: 0 /100 WBC (ref 0–0.2)
PLATELET # BLD AUTO: 449 10*3/MM3 (ref 140–450)
POTASSIUM SERPL-SCNC: 4 MMOL/L (ref 3.5–5.2)
RBC # BLD AUTO: 4.18 10*6/MM3 (ref 4.14–5.8)
SODIUM SERPL-SCNC: 139 MMOL/L (ref 136–145)
WBC # BLD AUTO: 7.16 10*3/MM3 (ref 3.4–10.8)

## 2019-01-01 PROCEDURE — 99213 OFFICE O/P EST LOW 20 MIN: CPT | Performed by: FAMILY MEDICINE

## 2019-01-01 PROCEDURE — 96372 THER/PROPH/DIAG INJ SC/IM: CPT | Performed by: FAMILY MEDICINE

## 2019-01-01 RX ADMIN — CYANOCOBALAMIN 1000 MCG: 1000 INJECTION, SOLUTION INTRAMUSCULAR; SUBCUTANEOUS at 13:42

## 2019-03-21 ENCOUNTER — CLINICAL SUPPORT (OUTPATIENT)
Dept: FAMILY MEDICINE CLINIC | Facility: CLINIC | Age: 79
End: 2019-03-21

## 2019-03-21 DIAGNOSIS — R53.83 FATIGUE, UNSPECIFIED TYPE: ICD-10-CM

## 2019-03-21 DIAGNOSIS — E53.8 VITAMIN B 12 DEFICIENCY: Primary | ICD-10-CM

## 2019-03-21 PROCEDURE — 96372 THER/PROPH/DIAG INJ SC/IM: CPT | Performed by: FAMILY MEDICINE

## 2019-03-21 RX ORDER — CYANOCOBALAMIN 1000 UG/ML
1000 INJECTION, SOLUTION INTRAMUSCULAR; SUBCUTANEOUS
Status: DISCONTINUED | OUTPATIENT
Start: 2019-03-21 | End: 2019-03-28

## 2019-03-21 RX ADMIN — CYANOCOBALAMIN 1000 MCG: 1000 INJECTION, SOLUTION INTRAMUSCULAR; SUBCUTANEOUS at 10:54

## 2019-03-21 NOTE — PROGRESS NOTES
Patient received cyanocobalamin (B12) injection of 1,000mcg/dl given IM in Left Deltoid.  Patient tolerated well.

## 2019-03-28 ENCOUNTER — CLINICAL SUPPORT (OUTPATIENT)
Dept: FAMILY MEDICINE CLINIC | Facility: CLINIC | Age: 79
End: 2019-03-28

## 2019-03-28 DIAGNOSIS — E53.8 VITAMIN B 12 DEFICIENCY: Primary | ICD-10-CM

## 2019-03-28 PROCEDURE — 96372 THER/PROPH/DIAG INJ SC/IM: CPT | Performed by: FAMILY MEDICINE

## 2019-03-28 RX ORDER — CYANOCOBALAMIN 1000 UG/ML
1000 INJECTION, SOLUTION INTRAMUSCULAR; SUBCUTANEOUS
Status: DISCONTINUED | OUTPATIENT
Start: 2019-03-28 | End: 2020-01-01

## 2019-03-28 RX ADMIN — CYANOCOBALAMIN 1000 MCG: 1000 INJECTION, SOLUTION INTRAMUSCULAR; SUBCUTANEOUS at 09:09

## 2019-03-28 NOTE — PROGRESS NOTES
Patient received cyanocobalamin (B12) injection of 1,000mcg/dl given IM in Right Deltoid.  Patient tolerated well.

## 2019-04-04 ENCOUNTER — CLINICAL SUPPORT (OUTPATIENT)
Dept: FAMILY MEDICINE CLINIC | Facility: CLINIC | Age: 79
End: 2019-04-04

## 2019-04-04 DIAGNOSIS — E53.8 B12 DEFICIENCY: ICD-10-CM

## 2019-04-04 PROCEDURE — 96372 THER/PROPH/DIAG INJ SC/IM: CPT | Performed by: FAMILY MEDICINE

## 2019-04-04 NOTE — PROGRESS NOTES
Patient received cyanocobalamin (B12) injection of 1,000mcg/dl given IM in Left Deltoid.  Patient tolerated well.  Next one due in 28 days

## 2019-04-08 RX ORDER — LISINOPRIL 20 MG/1
TABLET ORAL
Qty: 180 TABLET | Refills: 0 | Status: ON HOLD | OUTPATIENT
Start: 2019-04-08 | End: 2019-08-11

## 2019-04-11 ENCOUNTER — CLINICAL SUPPORT (OUTPATIENT)
Dept: FAMILY MEDICINE CLINIC | Facility: CLINIC | Age: 79
End: 2019-04-11

## 2019-04-11 DIAGNOSIS — E53.8 VITAMIN B 12 DEFICIENCY: Primary | ICD-10-CM

## 2019-04-11 PROCEDURE — 96372 THER/PROPH/DIAG INJ SC/IM: CPT | Performed by: FAMILY MEDICINE

## 2019-04-11 RX ORDER — CYANOCOBALAMIN 1000 UG/ML
1000 INJECTION, SOLUTION INTRAMUSCULAR; SUBCUTANEOUS
Status: DISCONTINUED | OUTPATIENT
Start: 2019-04-11 | End: 2020-01-01

## 2019-04-11 RX ADMIN — CYANOCOBALAMIN 1000 MCG: 1000 INJECTION, SOLUTION INTRAMUSCULAR; SUBCUTANEOUS at 10:13

## 2019-04-11 NOTE — PROGRESS NOTES
Patient received Cyanocobalamin 1,000mcg/dl given IM in Right Deltoid.  Patient tolerated well. Patient finished his five injection, one being at another office.

## 2019-04-18 ENCOUNTER — OFFICE VISIT (OUTPATIENT)
Dept: FAMILY MEDICINE CLINIC | Facility: CLINIC | Age: 79
End: 2019-04-18

## 2019-04-18 VITALS
TEMPERATURE: 97.6 F | DIASTOLIC BLOOD PRESSURE: 71 MMHG | HEART RATE: 50 BPM | SYSTOLIC BLOOD PRESSURE: 116 MMHG | HEIGHT: 74 IN | BODY MASS INDEX: 22 KG/M2 | WEIGHT: 171.4 LBS

## 2019-04-18 DIAGNOSIS — Z12.11 SCREENING FOR COLORECTAL CANCER: ICD-10-CM

## 2019-04-18 DIAGNOSIS — C61 PROSTATE CANCER (HCC): ICD-10-CM

## 2019-04-18 DIAGNOSIS — Z12.12 SCREENING FOR COLORECTAL CANCER: ICD-10-CM

## 2019-04-18 DIAGNOSIS — E53.8 B12 DEFICIENCY: Primary | ICD-10-CM

## 2019-04-18 DIAGNOSIS — Z00.00 ANNUAL PHYSICAL EXAM: ICD-10-CM

## 2019-04-18 DIAGNOSIS — R53.83 FATIGUE, UNSPECIFIED TYPE: ICD-10-CM

## 2019-04-18 DIAGNOSIS — E78.2 MIXED HYPERLIPIDEMIA: ICD-10-CM

## 2019-04-18 DIAGNOSIS — E55.9 VITAMIN D DEFICIENCY: ICD-10-CM

## 2019-04-18 LAB
BILIRUB BLD-MCNC: NEGATIVE MG/DL
CLARITY, POC: CLEAR
COLOR UR: YELLOW
GLUCOSE UR STRIP-MCNC: NEGATIVE MG/DL
KETONES UR QL: NEGATIVE
LEUKOCYTE EST, POC: NEGATIVE
NITRITE UR-MCNC: NEGATIVE MG/ML
PH UR: 7 [PH] (ref 5–8)
PROT UR STRIP-MCNC: NEGATIVE MG/DL
RBC # UR STRIP: NEGATIVE /UL
SP GR UR: 1.01 (ref 1–1.03)
UROBILINOGEN UR QL: NORMAL

## 2019-04-18 PROCEDURE — 81003 URINALYSIS AUTO W/O SCOPE: CPT | Performed by: FAMILY MEDICINE

## 2019-04-18 PROCEDURE — G0439 PPPS, SUBSEQ VISIT: HCPCS | Performed by: FAMILY MEDICINE

## 2019-04-18 RX ORDER — TROSPIUM CHLORIDE 20 MG/1
20 TABLET, FILM COATED ORAL 2 TIMES DAILY
COMMUNITY
End: 2019-06-06 | Stop reason: SDUPTHER

## 2019-04-18 RX ORDER — DONEPEZIL HYDROCHLORIDE 5 MG/1
5 TABLET, FILM COATED ORAL NIGHTLY
Qty: 30 TABLET | Refills: 0 | Status: SHIPPED | OUTPATIENT
Start: 2019-04-18 | End: 2019-07-18

## 2019-04-18 RX ORDER — DONEPEZIL HYDROCHLORIDE 10 MG/1
10 TABLET, FILM COATED ORAL NIGHTLY
Qty: 90 TABLET | Refills: 3 | Status: SHIPPED | OUTPATIENT
Start: 2019-05-18 | End: 2020-01-01

## 2019-04-18 NOTE — PROGRESS NOTES
QUICK REFERENCE INFORMATION:  The ABCs of the Annual Wellness Visit    Subsequent Medicare Wellness Visit     HEALTH RISK ASSESSMENT    : 1940    Recent Hospitalizations:  No hospitalization(s) within the last year..  ccc      Current Medical Providers:  Patient Care Team:  Ben Mae MD as PCP - General (Family Medicine)  Cheli Hernandez MD (Urology)  Alexander Wilhelm MD (Dermatology)  Nick Aranda MD as Consulting Physician (Cardiology)  Farrukh Almaguer MD as Consulting Physician (Hematology and Oncology)        Smoking Status:  Social History     Tobacco Use   Smoking Status Former Smoker   • Packs/day: 1.00   • Years: 50.00   • Pack years: 50.00   • Types: Cigarettes   • Last attempt to quit: 2007   • Years since quittin.1   Smokeless Tobacco Never Used       Alcohol Consumption:  Social History     Substance and Sexual Activity   Alcohol Use Yes    Comment: occ       Depression Screen:   PHQ-2/PHQ-9 Depression Screening 2019   Little interest or pleasure in doing things 0   Feeling down, depressed, or hopeless 0   Total Score 0       Health Habits and Functional and Cognitive Screening:  Functional & Cognitive Status 2019   Do you have difficulty preparing food and eating? No   Do you have difficulty bathing yourself, getting dressed or grooming yourself? No   Do you have difficulty using the toilet? No   Do you have difficulty moving around from place to place? No   Do you have trouble with steps or getting out of a bed or a chair? No   In the past year have you fallen or experienced a near fall? No   Current Diet Well Balanced Diet   Dental Exam Not up to date   Eye Exam Not up to date   Exercise (times per week) 0 times per week   Current Exercise Activities Include None   Do you need help using the phone?  No   Are you deaf or do you have serious difficulty hearing?  No   Do you need help with transportation? No   Do you need help shopping? No   Do you need  help preparing meals?  No   Do you need help with housework?  No   Do you need help with laundry? No   Do you need help taking your medications? No   Do you need help managing money? No   Do you ever drive or ride in a car without wearing a seat belt? No   Have you felt unusual stress, anger or loneliness in the last month? No   Who do you live with? Spouse   If you need help, do you have trouble finding someone available to you? No   Have you been bothered in the last four weeks by sexual problems? No   Do you have difficulty concentrating, remembering or making decisions? Yes           Does the patient have evidence of cognitive impairment? No    Asiprin use counseling: Taking ASA appropriately as indicated      Recent Lab Results:    Lab Results   Component Value Date    GLU 71 02/20/2018        Lab Results   Component Value Date    TRIG 116 09/15/2017    HDL 41 09/15/2017    VLDL 23.2 09/15/2017           Age-appropriate Screening Schedule:  Refer to the list below for future screening recommendations based on patient's age, sex and/or medical conditions. Orders for these recommended tests are listed in the plan section. The patient has been provided with a written plan.    Health Maintenance   Topic Date Due   • ZOSTER VACCINE (2 of 3) 04/18/2020 (Originally 3/9/2015)   • INFLUENZA VACCINE  08/01/2019   • LIPID PANEL  04/18/2020   • TDAP/TD VACCINES (2 - Td) 09/15/2027   • PNEUMOCOCCAL VACCINES (65+ LOW/MEDIUM RISK)  Addressed        Subjective   History of Present Illness    Antony Duenas is a 79 y.o. male who presents for an Annual Wellness Visit.    The following portions of the patient's history were reviewed and updated as appropriate: allergies, current medications, past family history, past medical history, past social history, past surgical history and problem list.    Outpatient Medications Prior to Visit   Medication Sig Dispense Refill   • aspirin 81 MG tablet Take 81 mg by mouth Daily.     •  atorvastatin (LIPITOR) 10 MG tablet TAKE 1 TABLET BY MOUTH EVERY NIGHT. 90 tablet 3   • Cholecalciferol (VITAMIN D3) 5000 units capsule capsule Take 5,000 Units by mouth Daily.     • indapamide (LOZOL) 1.25 MG tablet TAKE 1 TABLET BY MOUTH DAILY (Patient taking differently: TAKE 1 TABLET MONDAY WEDNESDAY FRIDAY) 90 tablet 1   • lisinopril (PRINIVIL,ZESTRIL) 20 MG tablet TAKE 1 TABLET BY MOUTH TWICE DAILY 180 tablet 0   • metoprolol tartrate (LOPRESSOR) 50 MG tablet TAKE 1 TABLET BY MOUTH 2 TIMES A DAY. 180 tablet 3   • trospium (SANCTURA) 20 MG tablet Take 20 mg by mouth 2 (Two) Times a Day.     • Cholecalciferol (VITAMIN D3) 2000 units tablet Take 2,000 Units by mouth 2 (Two) Times a Day.     • gabapentin (NEURONTIN) 100 MG capsule 1-3 every 4-6 hours when necessary shingles pain 90 capsule 5     Facility-Administered Medications Prior to Visit   Medication Dose Route Frequency Provider Last Rate Last Dose   • cyanocobalamin injection 1,000 mcg  1,000 mcg Intramuscular Q28 Days Ben Mae MD   1,000 mcg at 03/28/19 0909   • cyanocobalamin injection 1,000 mcg  1,000 mcg Intramuscular Q28 Days Ben Mae MD   1,000 mcg at 04/11/19 1013       Patient Active Problem List   Diagnosis   • Cholelithiasis   • Prostate cancer (CMS/[facility])       Advance Care Planning:  Patient does not have an advance directive - information provided to the patient today    Identification of Risk Factors:  Risk factors include: increased fall risk and cognitive impairment.    Review of Systems   Cardiovascular:        Has calcification coronary arteries-sees cardiologist yearly   Gastrointestinal:        Colon cancer-[place] guard-up-to-date on scopes   Genitourinary:        AP resection for colon cancer- also prostatic cancer   Neurological:        Wife notices some minimal decline   All other systems reviewed and are negative.      Compared to one year ago, the patient feels his physical health is worse.  Compared to  "one year ago, the patient feels his mental health is the same.    Objective     Physical Exam   Constitutional: He is oriented to person, place, and time. He appears well-developed and well-nourished.   HENT:   Right Ear: External ear normal.   Left Ear: External ear normal.   Mouth/Throat: Oropharynx is clear and moist.   Eyes: Pupils are equal, round, and reactive to light.   Neck: No thyromegaly present.   Good carotid pulses   Cardiovascular: Normal rate and regular rhythm.   Pulmonary/Chest: Effort normal and breath sounds normal.   Abdominal: Soft. Bowel sounds are normal. He exhibits no mass. There is no tenderness.   Genitourinary:   Genitourinary Comments: Urologist been seen   Musculoskeletal: He exhibits no edema.   Lymphadenopathy:     He has no cervical adenopathy.   Neurological: He is alert and oriented to person, place, and time.   Skin: Skin is warm and dry. Capillary refill takes less than 2 seconds.   Psychiatric: He has a normal mood and affect. His behavior is normal. Judgment and thought content normal.   Nursing note and vitals reviewed.      Vitals:    04/18/19 0842   BP: 116/71   BP Location: Left arm   Patient Position: Sitting   Cuff Size: Adult   Pulse: 50   Temp: 97.6 °F (36.4 °C)   TempSrc: Oral   Weight: 77.7 kg (171 lb 6.4 oz)   Height: 188 cm (74\")   PainSc:   2   PainLoc: Toe       Patient's Body mass index is 22.01 kg/m². BMI is within normal parameters. No follow-up required..      Assessment/Plan   Patient Self-Management and Personalized Health Advice  The patient has been provided with information about: diet, exercise and fall prevention and preventive services including:   · Advance directive, Colorectal cancer screening, cologuard test ordered, Diabetes screening, see lab orders, Exercise counseling provided, Prostate cancer screening discussed.    Visit Diagnoses:    ICD-10-CM ICD-9-CM   1. B12 deficiency E53.8 266.2   2. Prostate cancer (CMS/HCC) C61 185   3. Fatigue, " unspecified type R53.83 780.79   4. Mixed hyperlipidemia E78.2 272.2   5. Screening for colorectal cancer Z12.11 V76.51    Z12.12    6. Vitamin D deficiency E55.9 268.9   7. Annual physical exam Z00.00 V70.0       Orders Placed This Encounter   Procedures   • TSH   • T4, free   • Cologuard - Stool, Per Rectum     Standing Status:   Future     Standing Expiration Date:   4/18/2020   • Comprehensive Metabolic Panel   • Lipid Panel   • PSA DIAGNOSTIC   • Vitamin D 25 Hydroxy   • POC Urinalysis Dipstick, Multipro   • CBC & Differential     Order Specific Question:   Manual Differential     Answer:   No       Outpatient Encounter Medications as of 4/18/2019   Medication Sig Dispense Refill   • aspirin 81 MG tablet Take 81 mg by mouth Daily.     • atorvastatin (LIPITOR) 10 MG tablet TAKE 1 TABLET BY MOUTH EVERY NIGHT. 90 tablet 3   • Cholecalciferol (VITAMIN D3) 5000 units capsule capsule Take 5,000 Units by mouth Daily.     • indapamide (LOZOL) 1.25 MG tablet TAKE 1 TABLET BY MOUTH DAILY (Patient taking differently: TAKE 1 TABLET MONDAY WEDNESDAY FRIDAY) 90 tablet 1   • lisinopril (PRINIVIL,ZESTRIL) 20 MG tablet TAKE 1 TABLET BY MOUTH TWICE DAILY 180 tablet 0   • metoprolol tartrate (LOPRESSOR) 50 MG tablet TAKE 1 TABLET BY MOUTH 2 TIMES A DAY. 180 tablet 3   • trospium (SANCTURA) 20 MG tablet Take 20 mg by mouth 2 (Two) Times a Day.     • [DISCONTINUED] Cholecalciferol (VITAMIN D3) 2000 units tablet Take 2,000 Units by mouth 2 (Two) Times a Day.     • [DISCONTINUED] gabapentin (NEURONTIN) 100 MG capsule 1-3 every 4-6 hours when necessary shingles pain 90 capsule 5     Facility-Administered Encounter Medications as of 4/18/2019   Medication Dose Route Frequency Provider Last Rate Last Dose   • cyanocobalamin injection 1,000 mcg  1,000 mcg Intramuscular Q28 Days Ben Mae MD   1,000 mcg at 03/28/19 0909   • cyanocobalamin injection 1,000 mcg  1,000 mcg Intramuscular Q28 Days Ben Mae MD    1,000 mcg at 04/11/19 1013       Reviewed use of high risk medication in the elderly: yes  Reviewed for potential of harmful drug interactions in the elderly: yes    Follow Up:  Return in 6 months (on 10/18/2019).     An After Visit Summary and PPPS with all of these plans were given to the patient.           Plan above plus see 3 months-start Aricept 5 then go to 10-may add Namenda

## 2019-04-18 NOTE — PATIENT INSTRUCTIONS
Exercising to Stay Healthy  Exercising regularly is important. It has many health benefits, such as:  · Improving your overall fitness, flexibility, and endurance.  · Increasing your bone density.  · Helping with weight control.  · Decreasing your body fat.  · Increasing your muscle strength.  · Reducing stress and tension.  · Improving your overall health.    In order to become healthy and stay healthy, it is recommended that you do moderate-intensity and vigorous-intensity exercise. You can tell that you are exercising at a moderate intensity if you have a higher heart rate and faster breathing, but you are still able to hold a conversation. You can tell that you are exercising at a vigorous intensity if you are breathing much harder and faster and cannot hold a conversation while exercising.  How often should I exercise?  Choose an activity that you enjoy and set realistic goals. Your health care provider can help you to make an activity plan that works for you. Exercise regularly as directed by your health care provider. This may include:  · Doing resistance training twice each week, such as:  ? Push-ups.  ? Sit-ups.  ? Lifting weights.  ? Using resistance bands.  · Doing a given intensity of exercise for a given amount of time. Choose from these options:  ? 150 minutes of moderate-intensity exercise every week.  ? 75 minutes of vigorous-intensity exercise every week.  ? A mix of moderate-intensity and vigorous-intensity exercise every week.    Children, pregnant women, people who are out of shape, people who are overweight, and older adults may need to consult a health care provider for individual recommendations. If you have any sort of medical condition, be sure to consult your health care provider before starting a new exercise program.  What are some exercise ideas?  Some moderate-intensity exercise ideas include:  · Walking at a rate of 1 mile in 15  minutes.  · Biking.  · Hiking.  · Golfing.  · Dancing.    Some vigorous-intensity exercise ideas include:  · Walking at a rate of at least 4.5 miles per hour.  · Jogging or running at a rate of 5 miles per hour.  · Biking at a rate of at least 10 miles per hour.  · Lap swimming.  · Roller-skating or in-line skating.  · Cross-country skiing.  · Vigorous competitive sports, such as football, basketball, and soccer.  · Jumping rope.  · Aerobic dancing.    What are some everyday activities that can help me to get exercise?  · Yard work, such as:  ? Pushing a .  ? Raking and bagging leaves.  · Washing and waxing your car.  · Pushing a stroller.  · Shoveling snow.  · Gardening.  · Washing windows or floors.  How can I be more active in my day-to-day activities?  · Use the stairs instead of the elevator.  · Take a walk during your lunch break.  · If you drive, park your car farther away from work or school.  · If you take public transportation, get off one stop early and walk the rest of the way.  · Make all of your phone calls while standing up and walking around.  · Get up, stretch, and walk around every 30 minutes throughout the day.  What guidelines should I follow while exercising?  · Do not exercise so much that you hurt yourself, feel dizzy, or get very short of breath.  · Consult your health care provider before starting a new exercise program.  · Wear comfortable clothes and shoes with good support.  · Drink plenty of water while you exercise to prevent dehydration or heat stroke. Body water is lost during exercise and must be replaced.  · Work out until you breathe faster and your heart beats faster.  This information is not intended to replace advice given to you by your health care provider. Make sure you discuss any questions you have with your health care provider.  Document Released: 01/20/2012 Document Revised: 05/25/2017 Document Reviewed: 05/21/2015  [employer] Interactive Patient Education © 2018  [employer]      Fall Prevention in the Home, Adult  Falls can cause injuries and can affect people from all age groups. There are many simple things that you can do to make your home safe and to help prevent falls. Ask for help when making these changes, if needed.  What actions can I take to prevent falls?  General instructions  · Use good lighting in all rooms. Replace any light bulbs that burn out.  · Turn on lights if it is dark. Use night-lights.  · Place frequently used items in easy-to-reach places. Lower the shelves around your home if necessary.  · Set up furniture so that there are clear paths around it. Avoid moving your furniture around.  · Remove throw rugs and other tripping hazards from the floor.  · Avoid walking on wet floors.  · Fix any uneven floor surfaces.  · Add color or contrast paint or tape to grab bars and handrails in your home. Place contrasting color strips on the first and last steps of stairways.  · When you use a stepladder, make sure that it is completely opened and that the sides are firmly locked. Have someone hold the ladder while you are using it. Do not climb a closed stepladder.  · Be aware of any and all pets.  What can I do in the bathroom?  · Keep the floor dry. Immediately clean up any water that spills onto the floor.  · Remove soap buildup in the tub or shower on a regular basis.  · Use non-skid mats or decals on the floor of the tub or shower.  · Attach bath mats securely with double-sided, non-slip rug tape.  · If you need to sit down while you are in the shower, use a plastic, non-slip stool.  · Install grab bars by the toilet and in the tub and shower. Do not use towel bars as grab bars.  What can I do in the bedroom?  · Make sure that a bedside light is easy to reach.  · Do not use oversized bedding that drapes onto the floor.  · Have a firm chair that has side arms to use for getting dressed.  What can I do in the kitchen?  · Clean up any spills right away.  · If  you need to reach for something above you, use a sturdy step stool that has a grab bar.  · Keep electrical cables out of the way.  · Do not use floor polish or wax that makes floors slippery. If you must use wax, make sure that it is non-skid floor wax.  What can I do in the stairways?  · Do not leave any items on the stairs.  · Make sure that you have a light switch at the top of the stairs and the bottom of the stairs. Have them installed if you do not have them.  · Make sure that there are handrails on both sides of the stairs. Fix handrails that are broken or loose. Make sure that handrails are as long as the stairways.  · Install non-slip stair treads on all stairs in your home.  · Avoid having throw rugs at the top or bottom of stairways, or secure the rugs with carpet tape to prevent them from moving.  · Choose a carpet design that does not hide the edge of steps on the stairway.  · Check any carpeting to make sure that it is firmly attached to the stairs. Fix any carpet that is loose or worn.  What can I do on the outside of my home?  · Use bright outdoor lighting.  · Regularly repair the edges of walkways and driveways and fix any cracks.  · Remove high doorway thresholds.  · Trim any shrubbery on the main path into your home.  · Regularly check that handrails are securely fastened and in good repair. Both sides of any steps should have handrails.  · Install guardrails along the edges of any raised decks or porches.  · Clear walkways of debris and clutter, including tools and rocks.  · Have leaves, snow, and ice cleared regularly.  · Use sand or salt on walkways during winter months.  · In the garage, clean up any spills right away, including grease or oil spills.  What other actions can I take?  · Wear closed-toe shoes that fit well and support your feet. Wear shoes that have rubber soles or low heels.  · Use mobility aids as needed, such as canes, walkers, scooters, and crutches.  · Review your medicines  with your health care provider. Some medicines can cause dizziness or changes in blood pressure, which increase your risk of falling.  Talk with your health care provider about other ways that you can decrease your risk of falls. This may include working with a physical therapist or  to improve your strength, balance, and endurance.  Where to find more information  · Centers for Disease Control and Prevention, [place]: https://www.cdc.gov  · National [place] on Aging: https://qs8mtti.nasreen.nih.gov  Contact a health care provider if:  · You are afraid of falling at home.  · You feel weak, drowsy, or dizzy at home.  · You fall at home.  Summary  · There are many simple things that you can do to make your home safe and to help prevent falls.  · Ways to make your home safe include removing tripping hazards and installing grab bars in the bathroom.  · Ask for help when making these changes in your home.  This information is not intended to replace advice given to you by your health care provider. Make sure you discuss any questions you have with your health care provider.  Document Released: 2003 Document Revised: 2018 Document Reviewed: 2018  [employer] Interactive Patient Education © 2019 [employer].    Medicare Wellness  Personal Prevention Plan of Service     Date of Office Visit:  2019  Encounter Provider:  Ben Mae MD  Place of Service:  [facility]  Patient Name: Antony Duenas  :  1940    As part of the Medicare Wellness portion of your visit today, we are providing you with this personalized preventive plan of services (PPPS). This plan is based upon recommendations of the [place] Preventive Services Task Force (USPSTF) and the Advisory Committee on Immunization Practices (ACIP).    This lists the preventive care services that should be considered, and provides dates of when you are due. Items listed as completed are  up-to-date and do not require any further intervention.    Health Maintenance   Topic Date Due   • ZOSTER VACCINE (2 of 3) 04/18/2020 (Originally 3/9/2015)   • INFLUENZA VACCINE  08/01/2019   • MEDICARE ANNUAL WELLNESS  04/18/2020   • LIPID PANEL  04/18/2020   • TDAP/TD VACCINES (2 - Td) 09/15/2027   • PNEUMOCOCCAL VACCINES (65+ LOW/MEDIUM RISK)  Addressed       Orders Placed This Encounter   Procedures   • TSH   • T4, free   • Cologuard - Stool, Per Rectum     Standing Status:   Future     Standing Expiration Date:   4/18/2020   • Comprehensive Metabolic Panel   • Lipid Panel   • PSA DIAGNOSTIC   • Vitamin D 25 Hydroxy   • POC Urinalysis Dipstick, Multipro   • CBC & Differential     Order Specific Question:   Manual Differential     Answer:   No       Return in 6 months (on 10/18/2019).

## 2019-04-19 LAB
25(OH)D3+25(OH)D2 SERPL-MCNC: 93.4 NG/ML (ref 30–100)
ALBUMIN SERPL-MCNC: 4.6 G/DL (ref 3.5–5.2)
ALBUMIN/GLOB SERPL: 1.8 G/DL
ALP SERPL-CCNC: 44 U/L (ref 39–117)
ALT SERPL-CCNC: 11 U/L (ref 1–41)
AST SERPL-CCNC: 15 U/L (ref 1–40)
BASOPHILS # BLD AUTO: 0.09 10*3/MM3 (ref 0–0.2)
BASOPHILS NFR BLD AUTO: 1.3 % (ref 0–1.5)
BILIRUB SERPL-MCNC: 0.5 MG/DL (ref 0.2–1.2)
BUN SERPL-MCNC: 18 MG/DL (ref 8–23)
BUN/CREAT SERPL: 16.1 (ref 7–25)
CALCIUM SERPL-MCNC: 9.9 MG/DL (ref 8.6–10.5)
CHLORIDE SERPL-SCNC: 101 MMOL/L (ref 98–107)
CHOLEST SERPL-MCNC: 123 MG/DL (ref 0–200)
CO2 SERPL-SCNC: 27 MMOL/L (ref 22–29)
CREAT SERPL-MCNC: 1.12 MG/DL (ref 0.76–1.27)
EOSINOPHIL # BLD AUTO: 0.14 10*3/MM3 (ref 0–0.4)
EOSINOPHIL NFR BLD AUTO: 2 % (ref 0.3–6.2)
ERYTHROCYTE [DISTWIDTH] IN BLOOD BY AUTOMATED COUNT: 15.1 % (ref 12.3–15.4)
GLOBULIN SER CALC-MCNC: 2.6 GM/DL
GLUCOSE SERPL-MCNC: 85 MG/DL (ref 65–99)
HCT VFR BLD AUTO: 43.3 % (ref 37.5–51)
HDLC SERPL-MCNC: 50 MG/DL (ref 40–60)
HGB BLD-MCNC: 13.2 G/DL (ref 13–17.7)
IMM GRANULOCYTES # BLD AUTO: 0.02 10*3/MM3 (ref 0–0.05)
IMM GRANULOCYTES NFR BLD AUTO: 0.3 % (ref 0–0.5)
LDLC SERPL CALC-MCNC: 57 MG/DL (ref 0–100)
LYMPHOCYTES # BLD AUTO: 0.85 10*3/MM3 (ref 0.7–3.1)
LYMPHOCYTES NFR BLD AUTO: 12 % (ref 19.6–45.3)
MCH RBC QN AUTO: 30.8 PG (ref 26.6–33)
MCHC RBC AUTO-ENTMCNC: 30.5 G/DL (ref 31.5–35.7)
MCV RBC AUTO: 101.2 FL (ref 79–97)
MONOCYTES # BLD AUTO: 0.54 10*3/MM3 (ref 0.1–0.9)
MONOCYTES NFR BLD AUTO: 7.6 % (ref 5–12)
NEUTROPHILS # BLD AUTO: 5.44 10*3/MM3 (ref 1.4–7)
NEUTROPHILS NFR BLD AUTO: 76.8 % (ref 42.7–76)
NRBC BLD AUTO-RTO: 0.1 /100 WBC (ref 0–0)
PLATELET # BLD AUTO: 391 10*3/MM3 (ref 140–450)
POTASSIUM SERPL-SCNC: 5.9 MMOL/L (ref 3.5–5.2)
PROT SERPL-MCNC: 7.2 G/DL (ref 6–8.5)
PSA SERPL-MCNC: <0.014 NG/ML (ref 0–4)
RBC # BLD AUTO: 4.28 10*6/MM3 (ref 4.14–5.8)
SODIUM SERPL-SCNC: 143 MMOL/L (ref 136–145)
T4 FREE SERPL-MCNC: 1.16 NG/DL (ref 0.93–1.7)
TRIGL SERPL-MCNC: 81 MG/DL (ref 0–150)
TSH SERPL DL<=0.005 MIU/L-ACNC: 1.14 MIU/ML (ref 0.27–4.2)
VLDLC SERPL CALC-MCNC: 16.2 MG/DL (ref 5–40)
WBC # BLD AUTO: 7.08 10*3/MM3 (ref 3.4–10.8)

## 2019-05-24 RX ORDER — INDAPAMIDE 1.25 MG/1
TABLET, FILM COATED ORAL
Qty: 90 TABLET | Refills: 3 | Status: SHIPPED | OUTPATIENT
Start: 2019-05-24 | End: 2019-07-18

## 2019-05-24 RX ORDER — DONEPEZIL HYDROCHLORIDE 5 MG/1
TABLET, FILM COATED ORAL
Qty: 30 TABLET | Refills: 0 | OUTPATIENT
Start: 2019-05-24

## 2019-06-07 RX ORDER — TROSPIUM CHLORIDE 20 MG/1
TABLET, FILM COATED ORAL
Qty: 60 TABLET | Refills: 6 | Status: ON HOLD | OUTPATIENT
Start: 2019-06-07 | End: 2019-08-11

## 2019-06-10 ENCOUNTER — TELEPHONE (OUTPATIENT)
Dept: FAMILY MEDICINE CLINIC | Facility: CLINIC | Age: 79
End: 2019-06-10

## 2019-06-10 RX ORDER — MELATONIN 10 MG
1 CAPSULE ORAL AS NEEDED
COMMUNITY
End: 2020-01-01 | Stop reason: ALTCHOICE

## 2019-06-10 NOTE — TELEPHONE ENCOUNTER
SPOUSE STATES HE HAS ANXIETY-OSTOMY BAG CAUSES SOME ISSUES AT NIGHT & FOR SLEEP. PT IS WANTING TO SLEEP UPRIGHT IN BED-FEELS LIKE HE WOULD HAVE AN ACCIDENT IN BED  SHE IS INQUIRING IF HE COULD TAKE SOME MELATONIN FOR BEDTIME.  PLEASE ADVISE

## 2019-07-18 ENCOUNTER — OFFICE VISIT (OUTPATIENT)
Dept: FAMILY MEDICINE CLINIC | Facility: CLINIC | Age: 79
End: 2019-07-18

## 2019-07-18 VITALS
HEIGHT: 74 IN | TEMPERATURE: 97.5 F | BODY MASS INDEX: 21.36 KG/M2 | HEART RATE: 61 BPM | OXYGEN SATURATION: 93 % | SYSTOLIC BLOOD PRESSURE: 109 MMHG | WEIGHT: 166.4 LBS | DIASTOLIC BLOOD PRESSURE: 59 MMHG

## 2019-07-18 DIAGNOSIS — D47.2 GAMMOPATHY: ICD-10-CM

## 2019-07-18 DIAGNOSIS — R53.83 FATIGUE, UNSPECIFIED TYPE: Primary | ICD-10-CM

## 2019-07-18 DIAGNOSIS — E53.8 B12 DEFICIENCY: ICD-10-CM

## 2019-07-18 PROCEDURE — 99214 OFFICE O/P EST MOD 30 MIN: CPT | Performed by: FAMILY MEDICINE

## 2019-07-18 RX ORDER — MEMANTINE HYDROCHLORIDE 10 MG/1
10 TABLET ORAL 2 TIMES DAILY
Qty: 60 TABLET | Refills: 5 | Status: SHIPPED | OUTPATIENT
Start: 2019-07-18 | End: 2020-01-01

## 2019-07-18 NOTE — PROGRESS NOTES
Subjective   Antony Duenas is a 79 y.o. male.     79-year-old male with fatigue memory loss history of hypertension and hyperlipidemia      Fatigue   This is a chronic problem. The current episode started more than 1 year ago. The problem occurs daily. The problem has been waxing and waning. Associated symptoms include fatigue. Pertinent negatives include no chest pain. Associated symptoms comments: Significant memory loss. Nothing aggravates the symptoms. Treatments tried: Aricept-adding Namenda NicoDerm patches.       The following portions of the patient's history were reviewed and updated as appropriate: allergies, current medications, past family history, past medical history, past social history, past surgical history and problem list.    Review of Systems   Constitutional: Positive for fatigue.   Cardiovascular: Negative for chest pain and leg swelling.   Gastrointestinal:        History of rectal and prostate CA   Neurological:        Memory issues       Objective   Physical Exam   Cardiovascular: Normal rate and regular rhythm.   Pulmonary/Chest: Effort normal and breath sounds normal.   Musculoskeletal: He exhibits no edema.   Neurological: He is alert.   Psychiatric: He has a normal mood and affect. His behavior is normal.   Executive function obviously off   Nursing note and vitals reviewed.      Assessment/Plan   Antony was seen today for follow-up.    Diagnoses and all orders for this visit:    Fatigue, unspecified type  -     CBC & Differential  -     Comprehensive Metabolic Panel    B12 deficiency  -     Vitamin B12  -     Folate    Gammopathy  -     Comprehensive Metabolic Panel  -     EDVIN + PE    Other orders  -     memantine (NAMENDA) 10 MG tablet; Take 1 tablet by mouth 2 (Two) Times a Day.         Plan above plus return in 3 months-we will try the addition of Namenda and NicoDerm to his Aricept-start exercising

## 2019-07-19 LAB
ALBUMIN SERPL ELPH-MCNC: 3.9 G/DL (ref 2.9–4.4)
ALBUMIN SERPL-MCNC: 4.4 G/DL (ref 3.5–5.2)
ALBUMIN/GLOB SERPL: 1.4 {RATIO} (ref 0.7–1.7)
ALBUMIN/GLOB SERPL: 1.8 G/DL
ALP SERPL-CCNC: 42 U/L (ref 39–117)
ALPHA1 GLOB SERPL ELPH-MCNC: 0.2 G/DL (ref 0–0.4)
ALPHA2 GLOB SERPL ELPH-MCNC: 0.6 G/DL (ref 0.4–1)
ALT SERPL-CCNC: 12 U/L (ref 1–41)
AST SERPL-CCNC: 16 U/L (ref 1–40)
B-GLOBULIN SERPL ELPH-MCNC: 1 G/DL (ref 0.7–1.3)
BASOPHILS # BLD AUTO: 0.07 10*3/MM3 (ref 0–0.2)
BASOPHILS NFR BLD AUTO: 1.2 % (ref 0–1.5)
BILIRUB SERPL-MCNC: 0.3 MG/DL (ref 0.2–1.2)
BUN SERPL-MCNC: 17 MG/DL (ref 8–23)
BUN/CREAT SERPL: 14.7 (ref 7–25)
CALCIUM SERPL-MCNC: 9.5 MG/DL (ref 8.6–10.5)
CHLORIDE SERPL-SCNC: 102 MMOL/L (ref 98–107)
CO2 SERPL-SCNC: 26.7 MMOL/L (ref 22–29)
CREAT SERPL-MCNC: 1.16 MG/DL (ref 0.76–1.27)
EOSINOPHIL # BLD AUTO: 0.09 10*3/MM3 (ref 0–0.4)
EOSINOPHIL NFR BLD AUTO: 1.5 % (ref 0.3–6.2)
ERYTHROCYTE [DISTWIDTH] IN BLOOD BY AUTOMATED COUNT: 15.3 % (ref 12.3–15.4)
FOLATE SERPL-MCNC: 9.03 NG/ML (ref 4.78–24.2)
GAMMA GLOB SERPL ELPH-MCNC: 1 G/DL (ref 0.4–1.8)
GLOBULIN SER CALC-MCNC: 2.4 GM/DL
GLOBULIN SER-MCNC: 2.9 G/DL (ref 2.2–3.9)
GLUCOSE SERPL-MCNC: 84 MG/DL (ref 65–99)
HCT VFR BLD AUTO: 46.5 % (ref 37.5–51)
HGB BLD-MCNC: 14.3 G/DL (ref 13–17.7)
IGA SERPL-MCNC: 303 MG/DL (ref 61–437)
IGG SERPL-MCNC: 960 MG/DL (ref 700–1600)
IGM SERPL-MCNC: 59 MG/DL (ref 15–143)
IMM GRANULOCYTES # BLD AUTO: 0.01 10*3/MM3 (ref 0–0.05)
IMM GRANULOCYTES NFR BLD AUTO: 0.2 % (ref 0–0.5)
INTERPRETATION SERPL IEP-IMP: NORMAL
LABORATORY COMMENT REPORT: NORMAL
LYMPHOCYTES # BLD AUTO: 0.82 10*3/MM3 (ref 0.7–3.1)
LYMPHOCYTES NFR BLD AUTO: 13.9 % (ref 19.6–45.3)
M PROTEIN SERPL ELPH-MCNC: NORMAL G/DL
MCH RBC QN AUTO: 30.4 PG (ref 26.6–33)
MCHC RBC AUTO-ENTMCNC: 30.8 G/DL (ref 31.5–35.7)
MCV RBC AUTO: 98.9 FL (ref 79–97)
MONOCYTES # BLD AUTO: 0.46 10*3/MM3 (ref 0.1–0.9)
MONOCYTES NFR BLD AUTO: 7.8 % (ref 5–12)
NEUTROPHILS # BLD AUTO: 4.43 10*3/MM3 (ref 1.7–7)
NEUTROPHILS NFR BLD AUTO: 75.4 % (ref 42.7–76)
NRBC BLD AUTO-RTO: 0 /100 WBC (ref 0–0.2)
PLATELET # BLD AUTO: 421 10*3/MM3 (ref 140–450)
POTASSIUM SERPL-SCNC: 4.7 MMOL/L (ref 3.5–5.2)
PROT SERPL-MCNC: 6.8 G/DL (ref 6–8.5)
RBC # BLD AUTO: 4.7 10*6/MM3 (ref 4.14–5.8)
SODIUM SERPL-SCNC: 142 MMOL/L (ref 136–145)
VIT B12 SERPL-MCNC: 571 PG/ML (ref 211–946)
WBC # BLD AUTO: 5.88 10*3/MM3 (ref 3.4–10.8)

## 2019-07-22 RX ORDER — ATORVASTATIN CALCIUM 10 MG/1
TABLET, FILM COATED ORAL
Qty: 90 TABLET | Refills: 2 | Status: ON HOLD | OUTPATIENT
Start: 2019-07-22 | End: 2019-08-11

## 2019-08-02 ENCOUNTER — OFFICE VISIT (OUTPATIENT)
Dept: FAMILY MEDICINE CLINIC | Facility: CLINIC | Age: 79
End: 2019-08-02

## 2019-08-02 VITALS
TEMPERATURE: 97.3 F | WEIGHT: 163.8 LBS | SYSTOLIC BLOOD PRESSURE: 108 MMHG | DIASTOLIC BLOOD PRESSURE: 68 MMHG | HEART RATE: 64 BPM | HEIGHT: 74 IN | BODY MASS INDEX: 21.02 KG/M2

## 2019-08-02 DIAGNOSIS — Z71.89 ENCOUNTER FOR OSTOMY CARE EDUCATION: Primary | ICD-10-CM

## 2019-08-02 PROCEDURE — 99213 OFFICE O/P EST LOW 20 MIN: CPT | Performed by: NURSE PRACTITIONER

## 2019-08-02 NOTE — PATIENT INSTRUCTIONS
Recommendations:    1.  - Use adhesive remover to remove existing supplies and prevent skin         breakdown.    - Follow with washcloth and water and allow to dry.     - Do not use baby wipes because they will keep the system from         sticking appropriately.     2.  - Use a Convex barrier to raise stoma.   (Until you get convex barriers, cut barrier strip to attach to barrier  to form a convex barrier.)       - After applying barrier, warm the barrier by running your finger around             it.       - After applying bag, lay hand over bag and barrier to warm system and              help with adhesive.      3.  Recommend bag with charcoal filter and lock and roll closure system.     4. Stop using paste.    5.  When skin is irritated, use stoma adhesive powder, brush off excess powder and blot with skin protectant wipes or spray.    6.  Change barrier every 3-5 days, sooner if there is leakage.     7.  If you want precut barriers,  use 1 3/8 x 7/8.

## 2019-08-02 NOTE — PROGRESS NOTES
CC: ostomy education    History:  Antony Duenas is a 79 y.o. male who presents today for evaluation of the above problems.      Complains of skin breakdown and leakage of colostomy.      HPI  ROS:  Review of Systems   Skin:        Skin breakdown       No Known Allergies  Past Medical History:   Diagnosis Date   • Colon cancer (CMS/HCC)    • Hyperlipidemia    • Hypertension    • Prostate cancer (CMS/HCC)      Past Surgical History:   Procedure Laterality Date   • COLECTOMY PARTIAL / TOTAL     • COLONOSCOPY  08/02/2016   • PROSTATECTOMY     • TOTAL HIP ARTHROPLASTY Bilateral    • VASCULAR SURGERY       Family History   Problem Relation Age of Onset   • Heart disease Mother    • Stroke Mother    • Dementia Mother    • Prostate cancer Father    • Breast cancer Sister    • Brain cancer Sister    • Lung cancer Sister    • No Known Problems Daughter    • Hypertension Son    • No Known Problems Maternal Grandmother    • No Known Problems Maternal Grandfather    • No Known Problems Paternal Grandmother    • No Known Problems Paternal Grandfather       reports that he quit smoking about 12 years ago. His smoking use included cigarettes. He has a 50.00 pack-year smoking history. He has never used smokeless tobacco. He reports that he drinks alcohol. He reports that he does not use drugs.      Current Outpatient Medications:   •  aspirin 81 MG tablet, Take 81 mg by mouth Daily., Disp: , Rfl:   •  atorvastatin (LIPITOR) 10 MG tablet, TAKE 1 TABLET BY MOUTH EVERY NIGHT., Disp: 90 tablet, Rfl: 2  •  Cholecalciferol (VITAMIN D3) 5000 units capsule capsule, Take 5,000 Units by mouth 3 (Three) Times a Week., Disp: , Rfl:   •  donepezil (ARICEPT) 10 MG tablet, Take 1 tablet by mouth Every Night., Disp: 90 tablet, Rfl: 3  •  lisinopril (PRINIVIL,ZESTRIL) 20 MG tablet, TAKE 1 TABLET BY MOUTH TWICE DAILY, Disp: 180 tablet, Rfl: 0  •  Melatonin 10 MG capsule, Take 1 capsule by mouth Every Night., Disp: , Rfl:   •  memantine (NAMENDA) 10  "MG tablet, Take 1 tablet by mouth 2 (Two) Times a Day., Disp: 60 tablet, Rfl: 5  •  metoprolol tartrate (LOPRESSOR) 50 MG tablet, TAKE 1 TABLET BY MOUTH 2 TIMES A DAY., Disp: 180 tablet, Rfl: 3  •  trospium (SANCTURA) 20 MG tablet, TAKE 1 TABLET (20 MG TOTAL) BY MOUTH 2 TIMES A DAY., Disp: 60 tablet, Rfl: 6    Current Facility-Administered Medications:   •  cyanocobalamin injection 1,000 mcg, 1,000 mcg, Intramuscular, Q28 Days, Ben Mae MD, 1,000 mcg at 03/28/19 0909  •  cyanocobalamin injection 1,000 mcg, 1,000 mcg, Intramuscular, Q28 Days, Ben Mae MD, 1,000 mcg at 04/11/19 1013    OBJECTIVE:  /68 (BP Location: Left arm, Patient Position: Sitting, Cuff Size: Adult)   Pulse 64   Temp 97.3 °F (36.3 °C) (Temporal)   Ht 188 cm (74\")   Wt 74.3 kg (163 lb 12.8 oz)   BMI 21.03 kg/m²    Physical Exam   Constitutional: He is oriented to person, place, and time. Vital signs are normal. He appears well-developed and well-nourished.   Cardiovascular: Normal rate.   Pulmonary/Chest: Effort normal.   Abdominal:       Neurological: He is alert and oriented to person, place, and time.   Psychiatric: He has a normal mood and affect. His behavior is normal.   Vitals reviewed.      Assessment/Plan    Antony was seen today for consult.    Diagnoses and all orders for this visit:    Encounter for ostomy care education    Chelsie Olivarez RN (APRN student and wound ostomy nurse) provided extensive education to patient and wife, as well as recommendations on updating supplies for easier and more effective care.    Recommendations provided on after-visit summary.   Recommendations:    1.  - Use adhesive remover to remove existing supplies and prevent skin breakdown.    - Follow with washcloth and water and allow to dry.     - Do not use baby wipes because they will keep the system from sticking appropriately.     2.  - Use a Convex barrier to raise stoma.   (Until you get convex barriers, cut " barrier strip to attach to barrier to form a convex barrier.)       - After applying barrier, warm the barrier by running your finger around it.       - After applying bag, lay hand over bag and barrier to warm system and              help with adhesive.      3.  Recommend bag with charcoal filter and lock and roll closure system.     4. Stop using paste.    5.  When skin is irritated, use stoma adhesive powder, brush off excess powder and blot with skin protectant wipes or spray.    6.  Change barrier every 3-5 days, sooner if there is leakage.     7.  If you want precut barriers,  use 1 3/8 x 7/8.      An After Visit Summary was printed and given to the patient at discharge.  Return for Next scheduled follow up.       TAB Hopkins 08/02/2019    Electronically signed.

## 2019-08-09 ENCOUNTER — NURSE TRIAGE (OUTPATIENT)
Dept: CALL CENTER | Facility: HOSPITAL | Age: 79
End: 2019-08-09

## 2019-08-10 ENCOUNTER — APPOINTMENT (OUTPATIENT)
Dept: ULTRASOUND IMAGING | Facility: HOSPITAL | Age: 79
End: 2019-08-10

## 2019-08-10 ENCOUNTER — APPOINTMENT (OUTPATIENT)
Dept: CT IMAGING | Facility: HOSPITAL | Age: 79
End: 2019-08-10

## 2019-08-10 ENCOUNTER — APPOINTMENT (OUTPATIENT)
Dept: GENERAL RADIOLOGY | Facility: HOSPITAL | Age: 79
End: 2019-08-10

## 2019-08-10 ENCOUNTER — HOSPITAL ENCOUNTER (INPATIENT)
Facility: HOSPITAL | Age: 79
LOS: 3 days | Discharge: HOME | End: 2019-08-13
Attending: EMERGENCY MEDICINE | Admitting: SURGERY

## 2019-08-10 DIAGNOSIS — I74.5 ILIAC ARTERY OCCLUSION (HCC): Primary | ICD-10-CM

## 2019-08-10 DIAGNOSIS — N43.3 HYDROCELE, UNSPECIFIED HYDROCELE TYPE: ICD-10-CM

## 2019-08-10 LAB
ALBUMIN SERPL-MCNC: 3.4 G/DL (ref 3.5–5)
ALBUMIN/GLOB SERPL: 1.2 G/DL (ref 1.1–2.5)
ALP SERPL-CCNC: 41 U/L (ref 24–120)
ALT SERPL W P-5'-P-CCNC: 24 U/L (ref 0–54)
ANION GAP SERPL CALCULATED.3IONS-SCNC: 10 MMOL/L (ref 4–13)
APTT PPP: 38.8 SECONDS (ref 24.1–35)
AST SERPL-CCNC: 20 U/L (ref 7–45)
BASOPHILS # BLD AUTO: 0.04 10*3/MM3 (ref 0–0.2)
BASOPHILS NFR BLD AUTO: 0.9 % (ref 0–1.5)
BILIRUB SERPL-MCNC: 0.5 MG/DL (ref 0.1–1)
BUN BLD-MCNC: 10 MG/DL (ref 5–21)
BUN/CREAT SERPL: 11.5 (ref 7–25)
CALCIUM SPEC-SCNC: 8.9 MG/DL (ref 8.4–10.4)
CHLORIDE SERPL-SCNC: 105 MMOL/L (ref 98–110)
CO2 SERPL-SCNC: 27 MMOL/L (ref 24–31)
CREAT BLD-MCNC: 0.87 MG/DL (ref 0.5–1.4)
DEPRECATED RDW RBC AUTO: 49.1 FL (ref 37–54)
EOSINOPHIL # BLD AUTO: 0.08 10*3/MM3 (ref 0–0.4)
EOSINOPHIL NFR BLD AUTO: 1.9 % (ref 0.3–6.2)
ERYTHROCYTE [DISTWIDTH] IN BLOOD BY AUTOMATED COUNT: 15.1 % (ref 12.3–15.4)
GFR SERPL CREATININE-BSD FRML MDRD: 85 ML/MIN/1.73
GLOBULIN UR ELPH-MCNC: 2.8 GM/DL
GLUCOSE BLD-MCNC: 113 MG/DL (ref 70–100)
HCT VFR BLD AUTO: 34.7 % (ref 37.5–51)
HGB BLD-MCNC: 11.6 G/DL (ref 13–17.7)
IMM GRANULOCYTES # BLD AUTO: 0.01 10*3/MM3 (ref 0–0.05)
IMM GRANULOCYTES NFR BLD AUTO: 0.2 % (ref 0–0.5)
INR PPP: 1.08 (ref 0.91–1.09)
LYMPHOCYTES # BLD AUTO: 0.48 10*3/MM3 (ref 0.7–3.1)
LYMPHOCYTES NFR BLD AUTO: 11.3 % (ref 19.6–45.3)
MCH RBC QN AUTO: 30.4 PG (ref 26.6–33)
MCHC RBC AUTO-ENTMCNC: 33.4 G/DL (ref 31.5–35.7)
MCV RBC AUTO: 90.8 FL (ref 79–97)
MONOCYTES # BLD AUTO: 0.29 10*3/MM3 (ref 0.1–0.9)
MONOCYTES NFR BLD AUTO: 6.9 % (ref 5–12)
NEUTROPHILS # BLD AUTO: 3.33 10*3/MM3 (ref 1.7–7)
NEUTROPHILS NFR BLD AUTO: 78.8 % (ref 42.7–76)
NRBC BLD AUTO-RTO: 0 /100 WBC (ref 0–0.2)
PLATELET # BLD AUTO: 318 10*3/MM3 (ref 140–450)
PMV BLD AUTO: 10.6 FL (ref 6–12)
POTASSIUM BLD-SCNC: 3.8 MMOL/L (ref 3.5–5.3)
PROT SERPL-MCNC: 6.2 G/DL (ref 6.3–8.7)
PROTHROMBIN TIME: 14.4 SECONDS (ref 11.9–14.6)
RBC # BLD AUTO: 3.82 10*6/MM3 (ref 4.14–5.8)
SODIUM BLD-SCNC: 142 MMOL/L (ref 135–145)
WBC NRBC COR # BLD: 4.23 10*3/MM3 (ref 3.4–10.8)

## 2019-08-10 PROCEDURE — 85025 COMPLETE CBC W/AUTO DIFF WBC: CPT | Performed by: FAMILY MEDICINE

## 2019-08-10 PROCEDURE — 0 IOPAMIDOL PER 1 ML: Performed by: EMERGENCY MEDICINE

## 2019-08-10 PROCEDURE — 25010000002 ENOXAPARIN PER 10 MG: Performed by: SURGERY

## 2019-08-10 PROCEDURE — 93926 LOWER EXTREMITY STUDY: CPT

## 2019-08-10 PROCEDURE — 99284 EMERGENCY DEPT VISIT MOD MDM: CPT

## 2019-08-10 PROCEDURE — 93971 EXTREMITY STUDY: CPT

## 2019-08-10 PROCEDURE — 93005 ELECTROCARDIOGRAM TRACING: CPT | Performed by: EMERGENCY MEDICINE

## 2019-08-10 PROCEDURE — 94760 N-INVAS EAR/PLS OXIMETRY 1: CPT

## 2019-08-10 PROCEDURE — 99222 1ST HOSP IP/OBS MODERATE 55: CPT | Performed by: SURGERY

## 2019-08-10 PROCEDURE — 93010 ELECTROCARDIOGRAM REPORT: CPT | Performed by: INTERNAL MEDICINE

## 2019-08-10 PROCEDURE — 93971 EXTREMITY STUDY: CPT | Performed by: SURGERY

## 2019-08-10 PROCEDURE — 74174 CTA ABD&PLVS W/CONTRAST: CPT

## 2019-08-10 PROCEDURE — 80053 COMPREHEN METABOLIC PANEL: CPT | Performed by: FAMILY MEDICINE

## 2019-08-10 PROCEDURE — 85610 PROTHROMBIN TIME: CPT | Performed by: EMERGENCY MEDICINE

## 2019-08-10 PROCEDURE — 93926 LOWER EXTREMITY STUDY: CPT | Performed by: SURGERY

## 2019-08-10 PROCEDURE — 94799 UNLISTED PULMONARY SVC/PX: CPT

## 2019-08-10 PROCEDURE — 73502 X-RAY EXAM HIP UNI 2-3 VIEWS: CPT

## 2019-08-10 PROCEDURE — 85730 THROMBOPLASTIN TIME PARTIAL: CPT | Performed by: EMERGENCY MEDICINE

## 2019-08-10 RX ORDER — HYDROCODONE BITARTRATE AND ACETAMINOPHEN 5; 325 MG/1; MG/1
1 TABLET ORAL EVERY 6 HOURS PRN
Status: DISCONTINUED | OUTPATIENT
Start: 2019-08-10 | End: 2019-08-13 | Stop reason: HOSPADM

## 2019-08-10 RX ORDER — DONEPEZIL HYDROCHLORIDE 10 MG/1
10 TABLET, FILM COATED ORAL NIGHTLY
Status: DISCONTINUED | OUTPATIENT
Start: 2019-08-10 | End: 2019-08-13 | Stop reason: HOSPADM

## 2019-08-10 RX ORDER — ACETAMINOPHEN 325 MG/1
650 TABLET ORAL EVERY 4 HOURS PRN
Status: DISCONTINUED | OUTPATIENT
Start: 2019-08-10 | End: 2019-08-13 | Stop reason: HOSPADM

## 2019-08-10 RX ORDER — METOPROLOL TARTRATE 50 MG/1
50 TABLET, FILM COATED ORAL 2 TIMES DAILY
Status: DISCONTINUED | OUTPATIENT
Start: 2019-08-10 | End: 2019-08-10

## 2019-08-10 RX ORDER — MEMANTINE HYDROCHLORIDE 5 MG/1
10 TABLET ORAL 2 TIMES DAILY
Status: DISCONTINUED | OUTPATIENT
Start: 2019-08-10 | End: 2019-08-13 | Stop reason: HOSPADM

## 2019-08-10 RX ORDER — OXYBUTYNIN CHLORIDE 5 MG/1
10 TABLET, EXTENDED RELEASE ORAL DAILY
Status: DISCONTINUED | OUTPATIENT
Start: 2019-08-10 | End: 2019-08-13 | Stop reason: HOSPADM

## 2019-08-10 RX ORDER — SODIUM CHLORIDE 0.9 % (FLUSH) 0.9 %
3 SYRINGE (ML) INJECTION EVERY 12 HOURS SCHEDULED
Status: DISCONTINUED | OUTPATIENT
Start: 2019-08-10 | End: 2019-08-13 | Stop reason: HOSPADM

## 2019-08-10 RX ORDER — ONDANSETRON 2 MG/ML
4 INJECTION INTRAMUSCULAR; INTRAVENOUS ONCE
Status: DISCONTINUED | OUTPATIENT
Start: 2019-08-10 | End: 2019-08-13 | Stop reason: HOSPADM

## 2019-08-10 RX ORDER — ATORVASTATIN CALCIUM 10 MG/1
10 TABLET, FILM COATED ORAL NIGHTLY
Status: DISCONTINUED | OUTPATIENT
Start: 2019-08-10 | End: 2019-08-13 | Stop reason: HOSPADM

## 2019-08-10 RX ORDER — LISINOPRIL 20 MG/1
20 TABLET ORAL 2 TIMES DAILY
Status: DISCONTINUED | OUTPATIENT
Start: 2019-08-10 | End: 2019-08-10

## 2019-08-10 RX ORDER — SODIUM CHLORIDE 0.9 % (FLUSH) 0.9 %
3-10 SYRINGE (ML) INJECTION AS NEEDED
Status: DISCONTINUED | OUTPATIENT
Start: 2019-08-10 | End: 2019-08-13 | Stop reason: HOSPADM

## 2019-08-10 RX ORDER — DOCUSATE SODIUM 100 MG/1
100 CAPSULE, LIQUID FILLED ORAL 2 TIMES DAILY
Status: DISCONTINUED | OUTPATIENT
Start: 2019-08-10 | End: 2019-08-13 | Stop reason: HOSPADM

## 2019-08-10 RX ORDER — LISINOPRIL 20 MG/1
20 TABLET ORAL NIGHTLY
Status: DISCONTINUED | OUTPATIENT
Start: 2019-08-10 | End: 2019-08-13 | Stop reason: HOSPADM

## 2019-08-10 RX ORDER — ASPIRIN 81 MG/1
81 TABLET, CHEWABLE ORAL DAILY
Status: DISCONTINUED | OUTPATIENT
Start: 2019-08-10 | End: 2019-08-13 | Stop reason: HOSPADM

## 2019-08-10 RX ORDER — ONDANSETRON 2 MG/ML
4 INJECTION INTRAMUSCULAR; INTRAVENOUS EVERY 6 HOURS PRN
Status: DISCONTINUED | OUTPATIENT
Start: 2019-08-10 | End: 2019-08-13 | Stop reason: HOSPADM

## 2019-08-10 RX ADMIN — MEMANTINE HYDROCHLORIDE 10 MG: 5 TABLET, FILM COATED ORAL at 21:00

## 2019-08-10 RX ADMIN — IOPAMIDOL 100 ML: 755 INJECTION, SOLUTION INTRAVENOUS at 04:11

## 2019-08-10 RX ADMIN — SODIUM CHLORIDE, PRESERVATIVE FREE 3 ML: 5 INJECTION INTRAVENOUS at 08:38

## 2019-08-10 RX ADMIN — ASPIRIN 81 MG: 81 TABLET, CHEWABLE ORAL at 08:38

## 2019-08-10 RX ADMIN — SODIUM CHLORIDE, PRESERVATIVE FREE 3 ML: 5 INJECTION INTRAVENOUS at 21:00

## 2019-08-10 RX ADMIN — MEMANTINE HYDROCHLORIDE 10 MG: 5 TABLET, FILM COATED ORAL at 08:38

## 2019-08-10 RX ADMIN — LISINOPRIL 20 MG: 20 TABLET ORAL at 21:00

## 2019-08-10 RX ADMIN — ACETAMINOPHEN 650 MG: 325 TABLET, FILM COATED ORAL at 13:18

## 2019-08-10 RX ADMIN — ENOXAPARIN SODIUM 70 MG: 80 INJECTION SUBCUTANEOUS at 13:42

## 2019-08-10 RX ADMIN — DONEPEZIL HYDROCHLORIDE 10 MG: 10 TABLET, FILM COATED ORAL at 21:00

## 2019-08-10 RX ADMIN — ATORVASTATIN CALCIUM 10 MG: 10 TABLET, FILM COATED ORAL at 21:00

## 2019-08-10 NOTE — H&P
[facility] Medicine Services  HISTORY AND PHYSICAL    Date of Admission: 8/10/2019  Primary Care Physician: Ben Mae MD    Subjective     Chief Complaint: Leg pain    History of Present Illness  79-year-old  male who presents to the emergency department for complaints of left lower extremity pain.  The pain has been ongoing for a week.  It comes and goes.  Patient has vascular disease.  He also has history of colon and prostate cancer.  Patient states that tonight he decided to come to the emergency room because it hurt for him to stand on the leg and his ambulation was affected.  The patient usually ambulates in the home with a cane, and uses a walker at night.  Patient does have history of iliac artery stents.  Patient is also complaining of some right upper quadrant abdominal pain and what he calls stomach pain.  Patient has difficulty lying flat secondary to dyspnea, this is been ongoing for a long time.  He states is also more comfortable with his ostomy.  Patient denies any chest pain.  He had no bowel movement today.  The patient's wife notes that the patient's leg pain was so bad today that he was moaning.  Patient has chronic urine leakage.        Review of Systems   No bowel movement today with history of ostomy  Chronic urine leakage  Left lower extremity pain  Ambulatory difficulty    Otherwise complete ROS reviewed and negative except as mentioned in the HPI.    Past Medical History:   Past Medical History:   Diagnosis Date   • Colon cancer (CMS/HCC)    • Hyperlipidemia    • Hypertension    • Prostate cancer (CMS/HCC)      Past Surgical History:  Past Surgical History:   Procedure Laterality Date   • COLECTOMY PARTIAL / TOTAL     • COLONOSCOPY  08/02/2016   • PROSTATECTOMY     • TOTAL HIP ARTHROPLASTY Bilateral    • VASCULAR SURGERY       Social History:  reports that he quit smoking about 12 years ago. His smoking use included cigarettes. He  "has a 50.00 pack-year smoking history. He has never used smokeless tobacco. He reports that he drinks alcohol. He reports that he does not use drugs.    Family History: family history includes Brain cancer in his sister; Breast cancer in his sister; Dementia in his mother; Heart disease in his mother; Hypertension in his son; Lung cancer in his sister; No Known Problems in his daughter, maternal grandfather, maternal grandmother, paternal grandfather, and paternal grandmother; Prostate cancer in his father; Stroke in his mother.       Allergies:  No Known Allergies  Medications:  Prior to Admission medications    Medication Sig Start Date End Date Taking? Authorizing Provider   aspirin 81 MG tablet Take 81 mg by mouth Daily.    Provider, MD Aston   atorvastatin (LIPITOR) 10 MG tablet TAKE 1 TABLET BY MOUTH EVERY NIGHT. 7/22/19   Ben Mae MD   Cholecalciferol (VITAMIN D3) 5000 units capsule capsule Take 5,000 Units by mouth 3 (Three) Times a Week.    Provider, MD Aston   donepezil (ARICEPT) 10 MG tablet Take 1 tablet by mouth Every Night. 5/18/19   Ben Mae MD   lisinopril (PRINIVIL,ZESTRIL) 20 MG tablet TAKE 1 TABLET BY MOUTH TWICE DAILY 4/8/19   Ben Mae MD   Melatonin 10 MG capsule Take 1 capsule by mouth Every Night.    Ben Mae MD   memantine (NAMENDA) 10 MG tablet Take 1 tablet by mouth 2 (Two) Times a Day. 7/18/19   Ben Mae MD   metoprolol tartrate (LOPRESSOR) 50 MG tablet TAKE 1 TABLET BY MOUTH 2 TIMES A DAY. 5/14/18   Ben Mae MD   trospium (SANCTURA) 20 MG tablet TAKE 1 TABLET (20 MG TOTAL) BY MOUTH 2 TIMES A DAY. 6/7/19   Arely Peguero, DNP, APRN     Objective     Vital Signs: /49   Pulse (!) 49   Temp 98 °F (36.7 °C)   Resp 20   Ht 188 cm (74\")   Wt 73.9 kg (163 lb)   SpO2 99%   BMI 20.93 kg/m²   Physical Exam   HENT:   Head: Normocephalic and atraumatic.   Nose: Nose normal. " "  Mouth/Throat: Oropharynx is clear and moist.   Hard of hearing   Eyes: Conjunctivae and EOM are normal.   Neck: Normal range of motion. Neck supple.   Cardiovascular: Regular rhythm and normal heart sounds.   Thready pulses in the distal lower extremities bilaterally.  bradycardia   Pulmonary/Chest: Effort normal and breath sounds normal.   Port on the left upper chest wall   Abdominal: Soft. Bowel sounds are normal.   Musculoskeletal: He exhibits no edema or tenderness.   Neurological: He is alert. No cranial nerve deficit.   Skin: Skin is warm and dry.   Small circular lesions on the left lower extremity closer to the ankle several in nature nondraining not raised appear to be \"pick marks\".   Psychiatric: He has a normal mood and affect.   Vitals reviewed.          Results Reviewed:  Lab Results (last 24 hours)     Procedure Component Value Units Date/Time    Protime-INR [[id number]]  (Normal) Collected:  08/10/19 0219    Specimen:  Blood Updated:  08/10/19 0244     Protime 14.4 Seconds      INR 1.08    aPTT [[id number]]  (Abnormal) Collected:  08/10/19 0219    Specimen:  Blood Updated:  08/10/19 0244     PTT 38.8 seconds         Imaging Results (last 24 hours)     Procedure Component Value Units Date/Time    CT Angiogram Abdomen Pelvis With & Without Contrast [[id number]] Updated:  08/10/19 0428    XR Hip With or Without Pelvis 2 - 3 View Left [[id number]] Resulted:  08/10/19 0323     Updated:  08/10/19 0323    US Arterial Doppler Lower Extremity Left [[id number]] Updated:  08/10/19 0313    US Venous Doppler Lower Extremity Left (duplex) [[id number]] Updated:  08/10/19 0301        I have personally reviewed and interpreted the radiology studies and ECG obtained at time of admission.     Assessment / Plan     Assessment:   Active Hospital Problems    Diagnosis   • Iliac artery occlusion (CMS/HCC)     Impression:  1.  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  Abdominal pain  3.  " Bradycardia  4.  History of colon and prostate cancer  5.  Dyslipidemia  6.  Hypertension    Plan:   1.  Vascular surgery consult  2.  Neurovascular checks  3.  Hold beta-blocker  4.  Labs in the morning  5.  Lovenox was given at outside facility prior to transfer, will restart dose here and ask pharmacy to time accordingly  6.  Control with Norco, and bowel program        Code Status: Full     I discussed the patient's findings and my recommendations with the shunt and family at bedside    Estimated length of stay 1 to 2 days    Paulette West DO   08/10/19   6:08 AM

## 2019-08-10 NOTE — CONSULTS
Antony Duenas  [id number]  [id number]  384/1  Luis Herman MD  8/10/2019    Chief Complaint   Patient presents with   • [place] Transfer DVT rule out       HPI: Antony Duenas is a 79 y.o. male who presented with complaints of left lower extremity pain, worsening over the past week.  He has intact motor function.  There is some decrease in sensation. He did have left arterial duplex and a CTA of the abdomen and pelvis, which I did closely review.   He has had previous vascular surgery done at [facility] in  in which she had to left iliac stents placed.  He has not had any further follow-up since then.    Past Medical History:   Diagnosis Date   • Colon cancer (CMS/HCC)    • Hyperlipidemia    • Hypertension    • Prostate cancer (CMS/HCC)        Past Surgical History:   Procedure Laterality Date   • COLECTOMY PARTIAL / TOTAL     • COLONOSCOPY  2016   • PROSTATECTOMY     • TOTAL HIP ARTHROPLASTY Bilateral    • VASCULAR SURGERY         Family History   Problem Relation Age of Onset   • Heart disease Mother    • Stroke Mother    • Dementia Mother    • Prostate cancer Father    • Breast cancer Sister    • Brain cancer Sister    • Lung cancer Sister    • No Known Problems Daughter    • Hypertension Son    • No Known Problems Maternal Grandmother    • No Known Problems Maternal Grandfather    • No Known Problems Paternal Grandmother    • No Known Problems Paternal Grandfather        Social History     Socioeconomic History   • Marital status:      Spouse name: Not on file   • Number of children: Not on file   • Years of education: Not on file   • Highest education level: Not on file   Tobacco Use   • Smoking status: Former Smoker     Packs/day: 1.00     Years: 50.00     Pack years: 50.00     Types: Cigarettes     Last attempt to quit: 2007     Years since quittin.4   • Smokeless tobacco: Never Used   Substance and Sexual Activity   • Alcohol use: Yes     Comment: occ   • Drug use: No    • Sexual activity: Defer       No Known Allergies    Hospital Medications (active)       Dose Frequency Start End    acetaminophen (TYLENOL) tablet 650 mg 650 mg Every 4 Hours PRN 8/10/2019     Sig - Route: Take 2 tablets by mouth Every 4 (Four) Hours As Needed for Mild Pain . - Oral    aspirin chewable tablet 81 mg 81 mg Daily 8/10/2019     Sig - Route: Chew 1 tablet Daily. - Oral    atorvastatin (LIPITOR) tablet 10 mg 10 mg Nightly 8/10/2019     Sig - Route: Take 1 tablet by mouth Every Night. - Oral    docusate sodium (COLACE) capsule 100 mg 100 mg 2 Times Daily 8/10/2019     Sig - Route: Take 1 capsule by mouth 2 (Two) Times a Day. - Oral    donepezil (ARICEPT) tablet 10 mg 10 mg Nightly 8/10/2019     Sig - Route: Take 1 tablet by mouth Every Night. - Oral    enoxaparin (LOVENOX) syringe 30 mg 30 mg Nightly 8/10/2019     Sig - Route: Inject 0.3 mL under the skin into the appropriate area as directed Every Night. - Subcutaneous    Notes to Pharmacy: Please check administration time from [facility] and adjust time accordingly    HYDROcodone-acetaminophen (NORCO) 5-325 MG per tablet 1 tablet 1 tablet Every 6 Hours PRN 8/10/2019 8/20/2019    Sig - Route: Take 1 tablet by mouth Every 6 (Six) Hours As Needed for Moderate Pain . - Oral    iopamidol (ISOVUE-370) 76 % injection 100 mL 100 mL Once in Imaging 8/10/2019 8/10/2019    Sig - Route: Infuse 100 mL into a venous catheter Once. - Intravenous    lisinopril (PRINIVIL,ZESTRIL) tablet 20 mg 20 mg Nightly 8/10/2019     Sig - Route: Take 1 tablet by mouth Every Night. - Oral    memantine (NAMENDA) tablet 10 mg 10 mg 2 Times Daily 8/10/2019     Sig - Route: Take 2 tablets by mouth 2 (Two) Times a Day. - Oral    morphine injection 4 mg 4 mg Once 8/10/2019     Sig - Route: Infuse 1 mL into a venous catheter 1 (One) Time. - Intravenous    ondansetron (ZOFRAN) injection 4 mg 4 mg Once 8/10/2019     Sig - Route: Infuse 2 mL into a venous catheter 1 (One) Time. - Intravenous     ondansetron (ZOFRAN) injection 4 mg 4 mg Every 6 Hours PRN 8/10/2019     Sig - Route: Infuse 2 mL into a venous catheter Every 6 (Six) Hours As Needed for Nausea or Vomiting. - Intravenous    oxybutynin XL (DITROPAN-XL) 24 hr tablet 10 mg 10 mg Daily 8/10/2019     Sig - Route: Take 2 tablets by mouth Daily. - Oral    sodium chloride 0.9 % flush 3 mL 3 mL Every 12 Hours Scheduled 8/10/2019     Sig - Route: Infuse 3 mL into a venous catheter Every 12 (Twelve) Hours. - Intravenous    sodium chloride 0.9 % flush 3-10 mL 3-10 mL As Needed 8/10/2019     Sig - Route: Infuse 3-10 mL into a venous catheter As Needed for Line Care. - Intravenous    vitamin D3 capsule 5,000 Units 5,000 Units 3 Times Weekly 8/12/2019     Sig - Route: Take 1 capsule by mouth Once per day on Mon Wed Fri. - Oral    lisinopril (PRINIVIL,ZESTRIL) tablet 20 mg (Discontinued) 20 mg 2 Times Daily 8/10/2019 8/10/2019    Sig - Route: Take 1 tablet by mouth 2 (Two) Times a Day. - Oral    metoprolol tartrate (LOPRESSOR) tablet 50 mg (Discontinued) 50 mg 2 Times Daily 8/10/2019 8/10/2019    Sig - Route: Take 1 tablet by mouth 2 (Two) Times a Day. - Oral    vitamin D3 capsule 5,000 Units (Discontinued) 5,000 Units 3 Times Weekly 8/10/2019 8/10/2019    Sig - Route: Take 1 capsule by mouth Once per day on Mon Wed Fri. - Oral          Review of Systems   HENT: Negative.    Eyes: Negative.    Respiratory: Negative.    Cardiovascular: Negative.         Leg pain, bilateral left greater than right   Gastrointestinal: Negative.    Endocrine: Negative.    Genitourinary: Negative.    Musculoskeletal: Negative.    Skin: Negative.    Allergic/Immunologic: Negative.    Neurological: Positive for dizziness, weakness and numbness.   Hematological: Negative.    Psychiatric/Behavioral: Negative.    All other systems reviewed and are negative.      /42 (BP Location: Right arm, Patient Position: Lying)   Pulse (!) 48   Temp 97.5 °F (36.4 °C) (Oral)   Resp 16   Ht 188  "cm (74\")   Wt 74.4 kg (164 lb 1.6 oz)   SpO2 99%   BMI 21.07 kg/m²    Physical Exam   Constitutional: He is oriented to person, place, and time. He appears well-developed and well-nourished.   HENT:   Head: Normocephalic and atraumatic.   Eyes: Pupils are equal, round, and reactive to light. No scleral icterus.   Neck: Normal range of motion. Neck supple. No thyromegaly present.   Cardiovascular: Normal rate, regular rhythm and normal heart sounds.   Doppler signals bilaterally   Pulmonary/Chest: Effort normal and breath sounds normal.   Abdominal: Soft. Bowel sounds are normal.   Musculoskeletal: Normal range of motion.   Uses cane and walker   Neurological: He is alert and oriented to person, place, and time.   Skin: Skin is warm and dry.   Psychiatric: He has a normal mood and affect. His behavior is normal. Judgment and thought content normal.   Nursing note and vitals reviewed.      Laboratory Data:  Results from last 7 days   Lab Units 08/10/19  1035   WBC 10*3/mm3 4.23   HEMOGLOBIN g/dL 11.6*   HEMATOCRIT % 34.7*   PLATELETS 10*3/mm3 318       Results from last 7 days   Lab Units 08/10/19  1035   SODIUM mmol/L 142   POTASSIUM mmol/L 3.8   CHLORIDE mmol/L 105   CO2 mmol/L 27.0   BUN mg/dL 10   CREATININE mg/dL 0.87   CALCIUM mg/dL 8.9   BILIRUBIN mg/dL 0.5   ALK PHOS U/L 41   ALT (SGPT) U/L 24   AST (SGOT) U/L 20   GLUCOSE mg/dL 113*     Results from last 7 days   Lab Units 08/10/19  0219   INR  1.08   APTT seconds 38.8*         Diagnostic Data:  Imaging Results (last 24 hours)     Procedure Component Value Units Date/Time    XR Hip With or Without Pelvis 2 - 3 View Left [[id number]] Collected:  08/10/19 0735     Updated:  08/10/19 0739    Narrative:       XR HIP W OR WO PELVIS 2-3 VIEW LEFT- 8/10/2019 3:00 AM CDT     HISTORY: LEFT hip pain     COMPARISON: CT 08/10/2019      Findings:   Frontal and lateral views of the left hip were obtained. Frontal view of  the pelvis was also obtained.      There is no " fracture or dislocation. A LEFT hip arthroplasty is seen.  The sacroiliac joints are patent. A LEFT iliac artery stent is present.  Diffuse atherosclerosis is noted. A LEFT lower quadrant ostomy is  present.        Impression:       1. No evidence of acute bony abnormality in the pelvis or LEFT hip.        This report was finalized on 08/10/2019 07:36 by Dr. Waldemar Love MD.    CT Angiogram Abdomen Pelvis With & Without Contrast [[id number]] Collected:  08/10/19 0704     Updated:  08/10/19 0716    Narrative:       CT ANGIOGRAM ABDOMEN PELVIS W WO CONTRAST- 8/10/2019 3:57 AM CDT     HISTORY: rule out arterial occlusion (abnormal Ultrasound of leg)     COMPARISON: None     DOSE LENGTH PRODUCT: 694 mGy cm. Automated exposure control was also  utilized to decrease patient radiation dose.     TECHNIQUE: Helical tomographic images of the abdomen and pelvis  utilizing angiographic protocol were obtained following the intravenous  infusion of contrast. Multiplanar and 3 D reformatted images were  provided for review.     FINDINGS:     Angiogram:  There is marked atherosclerosis in the aorta without evidence of  aneurysm. Atherosclerotic calcifications are also noted in the celiac  artery and its branches and the SMA. There is no significant stenosis of  the structures.     Atherosclerosis is seen in the renal artery origins, but there is no  significant stenosis or aneurysm.     There is complete occlusion of the LEFT common iliac, external iliac,  and common femoral artery. Reconstitution of flow is seen at the LEFT  femoral bifurcation. An iliac artery stent is seen.     There moderate to marked atherosclerosis is noted in the RIGHT iliac and  femoral arteries, as well, but there is no occlusion. Moderate to marked  atherosclerotic stenosis of the RIGHT external iliac artery is noted.  There is also critical stenosis of the RIGHT common femoral artery on  axial image 169.     The internal iliac arteries are occluded.      Other findings:  Emphysematous changes are seen in the lower lungs. A small gallstone is  noted. No acute process is seen in the liver, pancreas, kidneys, adrenal  glands, spleen, or GI tract. A moderate to large hydrocele is noted.  There is a fluid collection in the region of the urethra, possibly due  to a large diverticulum. There is an age-indeterminate compression  fracture in the L2 vertebral body. The bones are osteopenic. A LEFT  lower quadrant colostomy is noted.       Impression:       1. Occluded LEFT common iliac, external iliac, and LEFT common femoral  arteries. No aneurysm.  2. High-grade stenoses of the proximal RIGHT external iliac artery and  the RIGHT common femoral artery.  3. Cholelithiasis.  4. Age-indeterminate L2 vertebral body fracture with approximately 30%  vertebral body height loss.  5. 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     A preliminary report was provided by [facility]. I  agree with the preliminary interpretation.     This report was finalized on 08/10/2019 07:12 by Dr. Waldemar Love MD.    US Arterial Doppler Lower Extremity Left [[id number]] Updated:  08/10/19 0313    US Venous Doppler Lower Extremity Left (duplex) [[id number]] Updated:  08/10/19 0301          Impression:    Iliac artery occlusion (CMS/HCC)      Plan: After thoroughly evaluating Antony Duenas, I believe the best course of action is to proceed with revascularization on Tuesday morning.  He will need extensive revascularization with bilateral common femoral endarterectomies as well as declotting of the left iliac stents and likely balloon angioplasty stenting of the right external iliac artery.  Risk/benefits were explained at great length to the patient which include but not limited to bleeding, infection, vessel damage, nerve damage, and loss of limb.  The patient understands the risks and wishes for me to proceed.  While he is here we will ask cardiology  to evaluate for risk stratification.  The patient and family are in agreement's.  He will stay on anticoagulation with Lovenox until the surgery.  Thank you for allowing me to participate in the care of your patient.  Please do not hesitate to call with any questions or concerns.    Michael Colin, DO   Vascular Surgery  [phone]

## 2019-08-10 NOTE — PLAN OF CARE
Problem: Patient Care Overview  Goal: Plan of Care Review  Outcome: Ongoing (interventions implemented as appropriate)   08/10/19 1418   Coping/Psychosocial   Plan of Care Reviewed With patient   Plan of Care Review   Progress no change   Pt given tylenol one time for a HA. Lovenox ordered. Ostomy in place. Pulses dopplered. Possible surgery on Tuesday.   Goal: Individualization and Mutuality  Outcome: Ongoing (interventions implemented as appropriate)    Goal: Discharge Needs Assessment  Outcome: Ongoing (interventions implemented as appropriate)      Problem: VTE, DVT and PE (Adult)  Goal: Signs and Symptoms of Listed Potential Problems Will be Absent, Minimized or Managed (VTE, DVT and PE)  Outcome: Ongoing (interventions implemented as appropriate)   08/10/19 1418   Goal/Outcome Evaluation   Problems Assessed (VTE, DVT, PE) all   Problems Present (VTE, DVT, PE) deep vein thrombosis       Problem: Pain, Acute (Adult)  Goal: Identify Related Risk Factors and Signs and Symptoms  Outcome: Ongoing (interventions implemented as appropriate)   08/10/19 1418   Pain, Acute (Adult)   Related Risk Factors (Acute Pain) disease process;patient perception   Signs and Symptoms (Acute Pain) verbalization of pain descriptors     Goal: Acceptable Pain Control/Comfort Level  Outcome: Ongoing (interventions implemented as appropriate)   08/10/19 1418   Pain, Acute (Adult)   Acceptable Pain Control/Comfort Level making progress toward outcome

## 2019-08-10 NOTE — TELEPHONE ENCOUNTER
Triage nurse tried 5 times to call this person back did leave one message to call us back if still needing help.     Reason for Disposition  • Message left on identified answering machine.    Additional Information  • Negative: Caller is angry or rude (e.g., hangs up, verbally abusive, yelling)  • Negative: Caller hangs up  • Negative: Caller has already spoken with the PCP and has no further questions.  • Negative: Caller has already spoken with another triager and has no further questions.  • Negative: Caller has already spoken with another triager or PCP AND has further questions AND triager able to answer questions.  • Negative: Busy signal.  First attempt to contact caller.  Follow-up call scheduled within 15 minutes.  • Negative: No answer.  First attempt to contact caller.  Follow-up call scheduled within 15 minutes.  • Negative: Message left on unidentified answering machine.  Answering service notified.  • Negative: Message left with person in household.    Protocols used: NO CONTACT OR DUPLICATE CONTACT CALL[facility]

## 2019-08-10 NOTE — PLAN OF CARE
Problem: Patient Care Overview  Goal: Plan of Care Review  Outcome: Ongoing (interventions implemented as appropriate)   08/10/19 0651   Coping/Psychosocial   Plan of Care Reviewed With patient   Plan of Care Review   Progress no change   OTHER   Outcome Summary Pt admitted this shift from ER after being transferred from [place]. Pt's has iliac stent that was found to be occluded. IV in place IID. Pt has ostomy in place. Voided upon arrival to the floor, incontinence at times. VSS. Will cont to monitor.      Goal: Discharge Needs Assessment  Outcome: Ongoing (interventions implemented as appropriate)   08/10/19 0623 08/10/19 0625 08/10/19 0651   Discharge Needs Assessment   Readmission Within the Last 30 Days --  --  no previous admission in last 30 days   Concerns to be Addressed --  --  no discharge needs identified   Patient/Family Anticipates Transition to --  home with family --    Patient/Family Anticipated Services at Transition --  none --    Transportation Anticipated --  family or friend will provide --    Disability   Equipment Currently Used at Home cane, straight;walker, standard --  --      Goal: Interprofessional Rounds/Family Conf  Outcome: Ongoing (interventions implemented as appropriate)   08/10/19 0651   Interdisciplinary Rounds/Family Conf   Participants nursing;patient       Problem: VTE, DVT and PE (Adult)  Goal: Signs and Symptoms of Listed Potential Problems Will be Absent, Minimized or Managed (VTE, DVT and PE)  Outcome: Ongoing (interventions implemented as appropriate)   08/10/19 0651   Goal/Outcome Evaluation   Problems Assessed (VTE, DVT, PE) all   Problems Present (VTE, DVT, PE) deep vein thrombosis       Problem: Pain, Acute (Adult)  Goal: Identify Related Risk Factors and Signs and Symptoms  Outcome: Ongoing (interventions implemented as appropriate)   08/10/19 0651   Pain, Acute (Adult)   Related Risk Factors (Acute Pain) disease process   Signs and Symptoms (Acute Pain)  verbalization of pain descriptors     Goal: Acceptable Pain Control/Comfort Level  Outcome: Ongoing (interventions implemented as appropriate)   08/10/19 0651   Pain, Acute (Adult)   Acceptable Pain Control/Comfort Level making progress toward outcome

## 2019-08-10 NOTE — ED PROVIDER NOTES
Subjective   78 y/o male arrives as transfer from OSH for US. The patient began to develop LEFT LE pain and swelling for 24 hours. He states the pain was intermittent and he denies any falls or trauma, numbness or tingling, loss of sensation, fevers, chills, cp, sob or other issues. He notes a history of bypass to his legs in the past but denies any anticoagulatioin usage at this time. He denies history DVT or PE. At OSH he was found ot have elevated dimer to 1.24 . Patient arrives in [facility].        Family, social and past history reviewed as below, prior documentation of H and Ps and other documentation are reviewed:    Past Medical History:  No date: Colon cancer (CMS/HCC)  No date: Hyperlipidemia  No date: Hypertension  No date: Prostate cancer (CMS/HCC)    Past Surgical History:  No date: COLECTOMY PARTIAL / TOTAL  2016: COLONOSCOPY  No date: PROSTATECTOMY  No date: TOTAL HIP ARTHROPLASTY; Bilateral  No date: VASCULAR SURGERY    Social History    Socioeconomic History      Marital status:       Spouse name: Not on file      Number of children: Not on file      Years of education: Not on file      Highest education level: Not on file    Tobacco Use      Smoking status: Former Smoker        Packs/day: 1.00        Years: 50.00        Pack years: 50        Types: Cigarettes        Quit date: 2007        Years since quittin.4      Smokeless tobacco: Never Used    Substance and Sexual Activity      Alcohol use: Yes        Comment: occ      Drug use: No      Sexual activity: Defer      Family history: reviewed and noncontributory             Review of Systems   All other systems reviewed and are negative.      Past Medical History:   Diagnosis Date   • Colon cancer (CMS/HCC)    • Hyperlipidemia    • Hypertension    • Prostate cancer (CMS/HCC)        No Known Allergies    Past Surgical History:   Procedure Laterality Date   • COLECTOMY PARTIAL / TOTAL     • COLONOSCOPY  2016   • PROSTATECTOMY      • TOTAL HIP ARTHROPLASTY Bilateral    • VASCULAR SURGERY         Family History   Problem Relation Age of Onset   • Heart disease Mother    • Stroke Mother    • Dementia Mother    • Prostate cancer Father    • Breast cancer Sister    • Brain cancer Sister    • Lung cancer Sister    • No Known Problems Daughter    • Hypertension Son    • No Known Problems Maternal Grandmother    • No Known Problems Maternal Grandfather    • No Known Problems Paternal Grandmother    • No Known Problems Paternal Grandfather        Social History     Socioeconomic History   • Marital status:      Spouse name: Not on file   • Number of children: Not on file   • Years of education: Not on file   • Highest education level: Not on file   Tobacco Use   • Smoking status: Former Smoker     Packs/day: 1.00     Years: 50.00     Pack years: 50.00     Types: Cigarettes     Last attempt to quit: 2007     Years since quittin.4   • Smokeless tobacco: Never Used   Substance and Sexual Activity   • Alcohol use: Yes     Comment: occ   • Drug use: No   • Sexual activity: Defer           Objective   Physical Exam   Constitutional: He is oriented to person, place, and time. He appears well-developed and well-nourished.   HENT:   Head: Normocephalic and atraumatic.   Eyes: EOM are normal. Pupils are equal, round, and reactive to light.   Neck: Normal range of motion. Neck supple.   Cardiovascular: Normal rate, regular rhythm, normal heart sounds and intact distal pulses.   Pulmonary/Chest: Effort normal and breath sounds normal.   Abdominal: Soft. Bowel sounds are normal.   Genitourinary:   Genitourinary Comments: He has no tenderness to his testicles, no overt findings of infection .   Musculoskeletal: Normal range of motion. He exhibits edema and tenderness.   Left lower extremity edema with some tenderness to pretty much the entire leg. No palpable cords    Pedal and dp are thready bilaterally but easily felt, cap refill less than 2  seconds, no shortening or rotation of the hip,        Neurological: He is alert and oriented to person, place, and time.   Skin: Skin is warm. Capillary refill takes less than 2 seconds.   Psychiatric: He has a normal mood and affect. His behavior is normal.   Vitals reviewed.      Procedures           ED Course    Already received lovenox at OSH    ED Course as of Aug 10 0510   Sat Aug 10, 2019   0323 Vascular called stating no DVT but examination is concerning for possible higher stenosis or occulsion in the aorta.   []   0502 CTA shows left iliac stent in place which occluded, occulded left internal iliac, occluded righ tinterna daniel, stenosis of right profudna and left.     Large right hydrocele and non specific fluid collection at the baser of bulbar urethra.       []   0504 Radiology thinks the fluid collection is from the hydrocele and reccomends NON EMERGENT US as outpatient basis    []   0504 Dr. Cristi livingston with adimssion to medicine.   []      ED Course User Index  [] Erasto Anders MD      XR Hip With or Without Pelvis 2 - 3 View Left   ED Interpretation   nad      US Arterial Doppler Lower Extremity Left    (Results Pending)   US Venous Doppler Lower Extremity Left (duplex)    (Results Pending)   CT Angiogram Abdomen Pelvis With & Without Contrast    (Results Pending)     Labs Reviewed   APTT - Abnormal; Notable for the following components:       Result Value    PTT 38.8 (*)     All other components within normal limits   PROTIME-INR - Normal               MDM      Final diagnoses:   Iliac artery occlusion (CMS/HCC)   Hydrocele, unspecified hydrocele type            Erasto Anders MD  08/10/19 0510

## 2019-08-11 PROBLEM — M19.90 ARTHRITIS: Status: ACTIVE | Noted: 2019-08-11

## 2019-08-11 PROBLEM — E78.5 HYPERLIPIDEMIA: Status: ACTIVE | Noted: 2019-08-11

## 2019-08-11 PROBLEM — I10 HYPERTENSION: Status: ACTIVE | Noted: 2019-08-11

## 2019-08-11 PROBLEM — D64.9 ANEMIA: Status: ACTIVE | Noted: 2019-08-11

## 2019-08-11 PROBLEM — I73.9 PVD (PERIPHERAL VASCULAR DISEASE) (HCC): Status: ACTIVE | Noted: 2019-08-11

## 2019-08-11 PROBLEM — Z85.038 HISTORY OF COLON CANCER: Status: ACTIVE | Noted: 2019-08-11

## 2019-08-11 LAB
ABO GROUP BLD: NORMAL
ALBUMIN SERPL-MCNC: 3 G/DL (ref 3.5–5)
ALBUMIN/GLOB SERPL: 1.1 G/DL (ref 1.1–2.5)
ALP SERPL-CCNC: 41 U/L (ref 24–120)
ALT SERPL W P-5'-P-CCNC: 23 U/L (ref 0–54)
ANION GAP SERPL CALCULATED.3IONS-SCNC: 5 MMOL/L (ref 4–13)
ARTICHOKE IGE QN: 46 MG/DL (ref 0–99)
AST SERPL-CCNC: 18 U/L (ref 7–45)
BASOPHILS # BLD AUTO: 0.05 10*3/MM3 (ref 0–0.2)
BASOPHILS NFR BLD AUTO: 1.2 % (ref 0–1.5)
BILIRUB SERPL-MCNC: 0.5 MG/DL (ref 0.1–1)
BLD GP AB SCN SERPL QL: NEGATIVE
BUN BLD-MCNC: 10 MG/DL (ref 5–21)
BUN/CREAT SERPL: 12.2 (ref 7–25)
CALCIUM SPEC-SCNC: 8.7 MG/DL (ref 8.4–10.4)
CHLORIDE SERPL-SCNC: 107 MMOL/L (ref 98–110)
CHOLEST SERPL-MCNC: 79 MG/DL (ref 130–200)
CO2 SERPL-SCNC: 28 MMOL/L (ref 24–31)
CREAT BLD-MCNC: 0.82 MG/DL (ref 0.5–1.4)
DEPRECATED RDW RBC AUTO: 50.4 FL (ref 37–54)
EOSINOPHIL # BLD AUTO: 0.12 10*3/MM3 (ref 0–0.4)
EOSINOPHIL NFR BLD AUTO: 2.9 % (ref 0.3–6.2)
ERYTHROCYTE [DISTWIDTH] IN BLOOD BY AUTOMATED COUNT: 15 % (ref 12.3–15.4)
GFR SERPL CREATININE-BSD FRML MDRD: 91 ML/MIN/1.73
GLOBULIN UR ELPH-MCNC: 2.8 GM/DL
GLUCOSE BLD-MCNC: 77 MG/DL (ref 70–100)
GLUCOSE BLDC GLUCOMTR-MCNC: 126 MG/DL (ref 70–130)
HBA1C MFR BLD: 5 % (ref 4.8–5.9)
HCT VFR BLD AUTO: 33.4 % (ref 37.5–51)
HDLC SERPL-MCNC: 33 MG/DL
HGB BLD-MCNC: 11.1 G/DL (ref 13–17.7)
IMM GRANULOCYTES # BLD AUTO: 0.01 10*3/MM3 (ref 0–0.05)
IMM GRANULOCYTES NFR BLD AUTO: 0.2 % (ref 0–0.5)
IRON 24H UR-MRATE: 31 MCG/DL (ref 42–180)
IRON SATN MFR SERPL: 13 % (ref 20–45)
LDLC/HDLC SERPL: 0.92 {RATIO}
LYMPHOCYTES # BLD AUTO: 0.68 10*3/MM3 (ref 0.7–3.1)
LYMPHOCYTES NFR BLD AUTO: 16.5 % (ref 19.6–45.3)
MCH RBC QN AUTO: 30.6 PG (ref 26.6–33)
MCHC RBC AUTO-ENTMCNC: 33.2 G/DL (ref 31.5–35.7)
MCV RBC AUTO: 92 FL (ref 79–97)
MONOCYTES # BLD AUTO: 0.37 10*3/MM3 (ref 0.1–0.9)
MONOCYTES NFR BLD AUTO: 9 % (ref 5–12)
NEUTROPHILS # BLD AUTO: 2.89 10*3/MM3 (ref 1.7–7)
NEUTROPHILS NFR BLD AUTO: 70.2 % (ref 42.7–76)
NRBC BLD AUTO-RTO: 0 /100 WBC (ref 0–0.2)
PLATELET # BLD AUTO: 283 10*3/MM3 (ref 140–450)
PMV BLD AUTO: 11.1 FL (ref 6–12)
POTASSIUM BLD-SCNC: 3.9 MMOL/L (ref 3.5–5.3)
PROT SERPL-MCNC: 5.8 G/DL (ref 6.3–8.7)
RBC # BLD AUTO: 3.63 10*6/MM3 (ref 4.14–5.8)
RH BLD: POSITIVE
SODIUM BLD-SCNC: 140 MMOL/L (ref 135–145)
T&S EXPIRATION DATE: NORMAL
TIBC SERPL-MCNC: 242 MCG/DL (ref 225–420)
TRIGL SERPL-MCNC: 78 MG/DL (ref 0–149)
TSH SERPL DL<=0.05 MIU/L-ACNC: 0.79 MIU/ML (ref 0.47–4.68)
WBC NRBC COR # BLD: 4.12 10*3/MM3 (ref 3.4–10.8)

## 2019-08-11 PROCEDURE — 85025 COMPLETE CBC W/AUTO DIFF WBC: CPT | Performed by: FAMILY MEDICINE

## 2019-08-11 PROCEDURE — 83540 ASSAY OF IRON: CPT | Performed by: NURSE PRACTITIONER

## 2019-08-11 PROCEDURE — 83550 IRON BINDING TEST: CPT | Performed by: NURSE PRACTITIONER

## 2019-08-11 PROCEDURE — 86900 BLOOD TYPING SEROLOGIC ABO: CPT | Performed by: SURGERY

## 2019-08-11 PROCEDURE — 84443 ASSAY THYROID STIM HORMONE: CPT | Performed by: FAMILY MEDICINE

## 2019-08-11 PROCEDURE — 83036 HEMOGLOBIN GLYCOSYLATED A1C: CPT | Performed by: FAMILY MEDICINE

## 2019-08-11 PROCEDURE — 80053 COMPREHEN METABOLIC PANEL: CPT | Performed by: FAMILY MEDICINE

## 2019-08-11 PROCEDURE — 80061 LIPID PANEL: CPT | Performed by: FAMILY MEDICINE

## 2019-08-11 PROCEDURE — 86850 RBC ANTIBODY SCREEN: CPT | Performed by: SURGERY

## 2019-08-11 PROCEDURE — 86901 BLOOD TYPING SEROLOGIC RH(D): CPT | Performed by: SURGERY

## 2019-08-11 PROCEDURE — 82962 GLUCOSE BLOOD TEST: CPT

## 2019-08-11 PROCEDURE — 99233 SBSQ HOSP IP/OBS HIGH 50: CPT | Performed by: SURGERY

## 2019-08-11 PROCEDURE — 25010000002 ENOXAPARIN PER 10 MG: Performed by: SURGERY

## 2019-08-11 RX ORDER — METOPROLOL TARTRATE 50 MG/1
25 TABLET, FILM COATED ORAL DAILY
COMMUNITY

## 2019-08-11 RX ORDER — ATORVASTATIN CALCIUM 10 MG/1
10 TABLET, FILM COATED ORAL NIGHTLY
COMMUNITY
End: 2020-01-01

## 2019-08-11 RX ORDER — LISINOPRIL 20 MG/1
20 TABLET ORAL DAILY
COMMUNITY
End: 2019-08-21

## 2019-08-11 RX ORDER — TROSPIUM CHLORIDE 20 MG/1
20 TABLET, FILM COATED ORAL 2 TIMES DAILY
COMMUNITY
End: 2020-01-01

## 2019-08-11 RX ADMIN — SODIUM CHLORIDE, PRESERVATIVE FREE 3 ML: 5 INJECTION INTRAVENOUS at 08:19

## 2019-08-11 RX ADMIN — ENOXAPARIN SODIUM 70 MG: 80 INJECTION SUBCUTANEOUS at 05:04

## 2019-08-11 RX ADMIN — LISINOPRIL 20 MG: 20 TABLET ORAL at 20:42

## 2019-08-11 RX ADMIN — ATORVASTATIN CALCIUM 10 MG: 10 TABLET, FILM COATED ORAL at 20:42

## 2019-08-11 RX ADMIN — DOCUSATE SODIUM 100 MG: 100 CAPSULE, LIQUID FILLED ORAL at 20:42

## 2019-08-11 RX ADMIN — MEMANTINE HYDROCHLORIDE 10 MG: 5 TABLET, FILM COATED ORAL at 08:18

## 2019-08-11 RX ADMIN — DOCUSATE SODIUM 100 MG: 100 CAPSULE, LIQUID FILLED ORAL at 08:18

## 2019-08-11 RX ADMIN — DONEPEZIL HYDROCHLORIDE 10 MG: 10 TABLET, FILM COATED ORAL at 20:42

## 2019-08-11 RX ADMIN — ASPIRIN 81 MG: 81 TABLET, CHEWABLE ORAL at 08:18

## 2019-08-11 RX ADMIN — SODIUM CHLORIDE, PRESERVATIVE FREE 3 ML: 5 INJECTION INTRAVENOUS at 20:47

## 2019-08-11 RX ADMIN — MEMANTINE HYDROCHLORIDE 10 MG: 5 TABLET, FILM COATED ORAL at 20:42

## 2019-08-11 RX ADMIN — ACETAMINOPHEN 650 MG: 325 TABLET, FILM COATED ORAL at 09:44

## 2019-08-11 RX ADMIN — OXYBUTYNIN CHLORIDE 10 MG: 5 TABLET, EXTENDED RELEASE ORAL at 08:18

## 2019-08-11 RX ADMIN — ENOXAPARIN SODIUM 70 MG: 80 INJECTION SUBCUTANEOUS at 17:27

## 2019-08-11 NOTE — PROGRESS NOTES
[facility] Medicine Services  INPATIENT PROGRESS NOTE    Patient Name: Antony Duenas  Date of Admission: 8/10/2019  Today's Date: 08/11/19  Length of Stay: 1  Primary Care Physician: Ben Mae MD    Subjective   Chief Complaint: leg pain    HPI   Currently sitting up in bed with family at bedside.  He states that he continues to have some leg and foot pain.  Cardiology to evaluate today.  Dr. Colin tentatively taking him to surgery tomorrow afternoon for revascularization.  No other complaints noted.     Review of Systems   Constitutional: Positive for activity change and fatigue. Negative for appetite change, chills and fever.   HENT: Negative for hearing loss, nosebleeds, tinnitus and trouble swallowing.    Eyes: Negative for visual disturbance.   Respiratory: Negative for cough, chest tightness, shortness of breath and wheezing.    Cardiovascular: Negative for chest pain, palpitations and leg swelling.   Gastrointestinal: Negative for abdominal distention, abdominal pain, blood in stool, constipation, diarrhea, nausea and vomiting.   Endocrine: Negative for cold intolerance, heat intolerance, polydipsia, polyphagia and polyuria.   Genitourinary: Negative for decreased urine volume, difficulty urinating, dysuria, flank pain, frequency and hematuria.   Musculoskeletal: Positive for myalgias. Negative for arthralgias and joint swelling.   Skin: Negative for rash.   Allergic/Immunologic: Negative for immunocompromised state.   Neurological: Positive for weakness. Negative for dizziness, syncope, light-headedness and headaches.   Hematological: Negative for adenopathy. Does not bruise/bleed easily.   Psychiatric/Behavioral: Negative for confusion and sleep disturbance. The patient is not nervous/anxious.       All pertinent negatives and positives are as above. All other systems have been reviewed and are negative unless otherwise stated.     Objective    Temp:   [97.6 °F (36.4 °C)-98.7 °F (37.1 °C)] 98.4 °F (36.9 °C)  Heart Rate:  [58-65] 61  Resp:  [16-18] 18  BP: (106-137)/(43-87) 110/43     Physical Exam   Constitutional: He is oriented to person, place, and time. He appears well-developed and well-nourished.   Sitting up in bed.  NAD.   HENT:   Head: Normocephalic and atraumatic.   Eyes: Conjunctivae and EOM are normal. Pupils are equal, round, and reactive to light.   Neck: Neck supple. No JVD present. No thyromegaly present.   Cardiovascular: Normal rate, regular rhythm, normal heart sounds and intact distal pulses. Exam reveals no gallop and no friction rub.   No murmur heard.  Pulmonary/Chest: Effort normal and breath sounds normal. No respiratory distress. He has no wheezes. He has no rales. He exhibits no tenderness.   Abdominal: Soft. Bowel sounds are normal. He exhibits no distension. There is no tenderness. There is no rebound and no guarding.   Musculoskeletal: Normal range of motion. He exhibits no edema, tenderness or deformity.   Lymphadenopathy:     He has no cervical adenopathy.   Neurological: He is alert and oriented to person, place, and time.   Skin: Skin is warm and dry. No rash noted.   Psychiatric: He has a normal mood and affect. His behavior is normal. Judgment and thought content normal.   Nursing note and vitals reviewed.    Results Review:  I have reviewed the labs, radiology results, and diagnostic studies.    Laboratory Data:   Results from last 7 days   Lab Units 08/11/19  0633 08/10/19  1035   WBC 10*3/mm3 4.12 4.23   HEMOGLOBIN g/dL 11.1* 11.6*   HEMATOCRIT % 33.4* 34.7*   PLATELETS 10*3/mm3 283 318     Results from last 7 days   Lab Units 08/11/19  0633 08/10/19  1035   SODIUM mmol/L 140 142   POTASSIUM mmol/L 3.9 3.8   CHLORIDE mmol/L 107 105   CO2 mmol/L 28.0 27.0   BUN mg/dL 10 10   CREATININE mg/dL 0.82 0.87   CALCIUM mg/dL 8.7 8.9   BILIRUBIN mg/dL 0.5 0.5   ALK PHOS U/L 41 41   ALT (SGPT) U/L 23 24   AST (SGOT) U/L 18 20   GLUCOSE  mg/dL 77 113*     Radiology Data:   Imaging Results (last 24 hours)     Procedure Component Value Units Date/Time    US Venous Doppler Lower Extremity Left (duplex) [[id number]] Collected:  08/11/19 1048     Updated:  08/11/19 1051    Narrative:       History: Pain       Impression:       Impression: There is no evidence of deep venous thrombosis or  superficial thrombophlebitis of the left lower extremity.     Comments: Left lower extremity venous duplex exam was performed using  color Doppler flow, Doppler wave form analysis, and grayscale imaging,  with and without compression. There is no evidence of deep venous  thrombosis of the common femoral, superficial femoral, popliteal,  posterior tibial, and peroneal veins. There is no thrombus identified in  the saphenofemoral junction or the greater saphenous vein. There is no  evidence of clot in the right common femoral vein.     This report was finalized on 08/11/2019 10:48 by Dr. Michael Colin MD.    US Arterial Doppler Lower Extremity Left [[id number]] Collected:  08/11/19 1043     Updated:  08/11/19 1048    Narrative:          History: Leg pain     Comments: Grayscale imaging as well as color flow duplex were used to  evaluate the left lower extremity arterial systems.     On the left, the peak systolic velocity in the common femoral artery  measured 116cm/s. In the profunda femoris artery measured 21.4cm/s. In  the proximal SFA measured 97.2cm/s. In the mid SFA measured 48.7cm/s. In  the distal SFA measured 41.6cm/s. In the popliteal artery measured  34.6cm/s. In the posterior tibial artery measured 16.5cm/s. In the  anterior tibial artery measured 24cm/s. The peak systolic velocity in  the right common femoral artery measured 103 cm/s.       Impression:       Patent left lower extremity arterial system without evidence  of significant stenosis. There is dampened waveforms bilaterally in the  common femoral arteries. This could indicate significant  bilateral  aortoiliac inflow disease.  This report was finalized on 08/11/2019 10:45 by Dr. Michael Colin MD.        I have reviewed the patient's current medications.     Assessment/Plan     Active Hospital Problems    Diagnosis   • **Iliac artery occlusion (CMS/HCC)   • Anemia   • PVD (peripheral vascular disease) (CMS/HCC)   • Hyperlipidemia   • History of colon cancer   • Hypertension     Overview:   Left iliac occlusion-thrombosis.    Overview:   : [  ]     • Prostate cancer (CMS/HCC)     Plan:  1.  Continue Lovenox until tomorrow morning  2.  Revascularization tomorrow with Dr. Colin  3.  CBC and BMP in AM  4.  Check iron profile  5.  Cardiology to see per Dr. Colin for surgical risk stratification   6.  Supportive care    Discharge Planning: I expect the patient to be discharged to home in 2 days.    TAB Valdez   08/11/19   12:01 PM    I personally evaluated and examined the patient in conjunction with TAB Steel and agree with the assessment, treatment plan, and disposition of the patient as recorded by her. My history, exam, and further recommendations are: I have reviewed and agree with the plans. Abebe.

## 2019-08-11 NOTE — PLAN OF CARE
Problem: Patient Care Overview  Goal: Plan of Care Review  Outcome: Ongoing (interventions implemented as appropriate)  Patient c/o right hip pain - po tylenol given. Resting comfortably. PT/DP pulses doppled 2+. For surgery tomorrow. IV x2 started. Spouse at bedside. Continue to monitor    08/11/19 1406   Coping/Psychosocial   Plan of Care Reviewed With patient   Plan of Care Review   Progress no change     Goal: Individualization and Mutuality  Outcome: Ongoing (interventions implemented as appropriate)    Goal: Discharge Needs Assessment  Outcome: Ongoing (interventions implemented as appropriate)   08/11/19 1406   Discharge Needs Assessment   Readmission Within the Last 30 Days no previous admission in last 30 days   Concerns to be Addressed no discharge needs identified   Patient/Family Anticipates Transition to home;home with family   Patient/Family Anticipated Services at Transition none   Transportation Concerns car, none   Transportation Anticipated family or friend will provide   Anticipated Changes Related to Illness none     Goal: Interprofessional Rounds/Family Conf  Outcome: Ongoing (interventions implemented as appropriate)   08/11/19 1406   Interdisciplinary Rounds/Family Conf   Participants family;nursing;patient;physician       Problem: VTE, DVT and PE (Adult)  Goal: Signs and Symptoms of Listed Potential Problems Will be Absent, Minimized or Managed (VTE, DVT and PE)  Outcome: Ongoing (interventions implemented as appropriate)   08/11/19 1406   Goal/Outcome Evaluation   Problems Assessed (VTE, DVT, PE) all       Problem: Pain, Acute (Adult)  Goal: Identify Related Risk Factors and Signs and Symptoms  Outcome: Ongoing (interventions implemented as appropriate)   08/11/19 1406   Pain, Acute (Adult)   Related Risk Factors (Acute Pain) disease process;knowledge deficit;patient perception;procedure/treatment;surgery   Signs and Symptoms (Acute Pain) fatigue/weakness     Goal: Acceptable Pain  Control/Comfort Level  Outcome: Ongoing (interventions implemented as appropriate)   08/11/19 1406   Pain, Acute (Adult)   Acceptable Pain Control/Comfort Level making progress toward outcome

## 2019-08-11 NOTE — PROGRESS NOTES
LOS: 1 day   Patient Care Team:  Ben Mae MD as PCP - General (Family Medicine)  Cheli Hernandez MD (Urology)  Alexander Wilhelm MD (Dermatology)  Nick Aranda MD as Consulting Physician (Cardiology)  Farrukh Almaguer MD as Consulting Physician (Hematology and Oncology)    Chief Complaint: Leg pain    Subjective     The patient was seen/examined the bedside.  No issues overnight.  Feet are stable and warm with Doppler signals present.  Patient has been up ambulating without difficulties.    Objective     Vital Signs  Temp:  [97.6 °F (36.4 °C)-98.7 °F (37.1 °C)] 98.1 °F (36.7 °C)  Heart Rate:  [58-65] 64  Resp:  [16-18] 18  BP: (106-137)/(50-87) 115/51    Physical Exam  Constitutional: He is oriented to person, place, and time. He appears well-developed and well-nourished.   HENT:   Head: Normocephalic and atraumatic.   Eyes: Pupils are equal, round, and reactive to light. No scleral icterus.   Neck: Normal range of motion. Neck supple. No thyromegaly present.   Cardiovascular: Normal rate, regular rhythm and normal heart sounds.   Doppler signals bilaterally   Pulmonary/Chest: Effort normal and breath sounds normal.   Abdominal: Soft. Bowel sounds are normal.   Musculoskeletal: Normal range of motion.   Uses cane and walker   Neurological: He is alert and oriented to person, place, and time.   Skin: Skin is warm and dry.   Psychiatric: He has a normal mood and affect. His behavior is normal. Judgment and thought content normal.   Nursing note and vitals reviewed.      Laboratory Data:   Results from last 7 days   Lab Units 08/11/19  0633 08/10/19  1035   WBC 10*3/mm3 4.12 4.23   HEMOGLOBIN g/dL 11.1* 11.6*   HEMATOCRIT % 33.4* 34.7*   PLATELETS 10*3/mm3 283 318       Results from last 7 days   Lab Units 08/11/19  0633 08/10/19  1035   SODIUM mmol/L 140 142   POTASSIUM mmol/L 3.9 3.8   CHLORIDE mmol/L 107 105   CO2 mmol/L 28.0 27.0   BUN mg/dL 10 10   CREATININE mg/dL 0.82 0.87   CALCIUM  mg/dL 8.7 8.9   BILIRUBIN mg/dL 0.5 0.5   ALK PHOS U/L 41 41   ALT (SGPT) U/L 23 24   AST (SGOT) U/L 18 20   GLUCOSE mg/dL 77 113*     Results from last 7 days   Lab Units 08/10/19  0219   INR  1.08   APTT seconds 38.8*            Assessment/Plan       Iliac artery occlusion (CMS/HCC)      Plan:  1.  For revascularization tomorrow afternoon.  2.  We will hold Lovenox tomorrow morning  3.  Cardiology to evaluate today.  Appreciate input.    Plan for disposition:2-3 days    Michael Colin,   Vascular Surgery  [phone]  08/11/19  10:56 AM

## 2019-08-11 NOTE — PLAN OF CARE
Problem: Patient Care Overview  Goal: Plan of Care Review  Outcome: Ongoing (interventions implemented as appropriate)      Problem: VTE, DVT and PE (Adult)  Goal: Signs and Symptoms of Listed Potential Problems Will be Absent, Minimized or Managed (VTE, DVT and PE)  Outcome: Ongoing (interventions implemented as appropriate)      Problem: Pain, Acute (Adult)  Goal: Identify Related Risk Factors and Signs and Symptoms  Outcome: Ongoing (interventions implemented as appropriate)    Goal: Acceptable Pain Control/Comfort Level  Outcome: Ongoing (interventions implemented as appropriate)

## 2019-08-12 ENCOUNTER — APPOINTMENT (OUTPATIENT)
Dept: INTERVENTIONAL RADIOLOGY/VASCULAR | Facility: HOSPITAL | Age: 79
End: 2019-08-12

## 2019-08-12 ENCOUNTER — ANESTHESIA EVENT (OUTPATIENT)
Dept: PERIOP | Facility: HOSPITAL | Age: 79
End: 2019-08-12

## 2019-08-12 ENCOUNTER — ANESTHESIA (OUTPATIENT)
Dept: PERIOP | Facility: HOSPITAL | Age: 79
End: 2019-08-12

## 2019-08-12 LAB
ANION GAP SERPL CALCULATED.3IONS-SCNC: 3 MMOL/L (ref 4–13)
BASOPHILS # BLD AUTO: 0.05 10*3/MM3 (ref 0–0.2)
BASOPHILS NFR BLD AUTO: 1.2 % (ref 0–1.5)
BUN BLD-MCNC: 11 MG/DL (ref 5–21)
BUN/CREAT SERPL: 12.9 (ref 7–25)
CALCIUM SPEC-SCNC: 8.4 MG/DL (ref 8.4–10.4)
CHLORIDE SERPL-SCNC: 109 MMOL/L (ref 98–110)
CO2 SERPL-SCNC: 29 MMOL/L (ref 24–31)
CREAT BLD-MCNC: 0.85 MG/DL (ref 0.5–1.4)
DEPRECATED RDW RBC AUTO: 52 FL (ref 37–54)
EOSINOPHIL # BLD AUTO: 0.14 10*3/MM3 (ref 0–0.4)
EOSINOPHIL NFR BLD AUTO: 3.4 % (ref 0.3–6.2)
ERYTHROCYTE [DISTWIDTH] IN BLOOD BY AUTOMATED COUNT: 15.2 % (ref 12.3–15.4)
GFR SERPL CREATININE-BSD FRML MDRD: 87 ML/MIN/1.73
GLUCOSE BLD-MCNC: 80 MG/DL (ref 70–100)
HCT VFR BLD AUTO: 32 % (ref 37.5–51)
HGB BLD-MCNC: 10.7 G/DL (ref 13–17.7)
IMM GRANULOCYTES # BLD AUTO: 0.01 10*3/MM3 (ref 0–0.05)
IMM GRANULOCYTES NFR BLD AUTO: 0.2 % (ref 0–0.5)
LYMPHOCYTES # BLD AUTO: 0.88 10*3/MM3 (ref 0.7–3.1)
LYMPHOCYTES NFR BLD AUTO: 21.5 % (ref 19.6–45.3)
MCH RBC QN AUTO: 30.9 PG (ref 26.6–33)
MCHC RBC AUTO-ENTMCNC: 33.4 G/DL (ref 31.5–35.7)
MCV RBC AUTO: 92.5 FL (ref 79–97)
MONOCYTES # BLD AUTO: 0.36 10*3/MM3 (ref 0.1–0.9)
MONOCYTES NFR BLD AUTO: 8.8 % (ref 5–12)
NEUTROPHILS # BLD AUTO: 2.66 10*3/MM3 (ref 1.7–7)
NEUTROPHILS NFR BLD AUTO: 64.9 % (ref 42.7–76)
NRBC BLD AUTO-RTO: 0 /100 WBC (ref 0–0.2)
PLATELET # BLD AUTO: 280 10*3/MM3 (ref 140–450)
PMV BLD AUTO: 10.7 FL (ref 6–12)
POTASSIUM BLD-SCNC: 4 MMOL/L (ref 3.5–5.3)
RBC # BLD AUTO: 3.46 10*6/MM3 (ref 4.14–5.8)
SODIUM BLD-SCNC: 141 MMOL/L (ref 135–145)
WBC NRBC COR # BLD: 4.1 10*3/MM3 (ref 3.4–10.8)

## 2019-08-12 PROCEDURE — 94799 UNLISTED PULMONARY SVC/PX: CPT

## 2019-08-12 PROCEDURE — 34201 REMOVAL OF ARTERY CLOT: CPT | Performed by: SURGERY

## 2019-08-12 PROCEDURE — B41D1ZZ FLUOROSCOPY OF AORTA AND BILATERAL LOWER EXTREMITY ARTERIES USING LOW OSMOLAR CONTRAST: ICD-10-PCS | Performed by: SURGERY

## 2019-08-12 PROCEDURE — 75716 ARTERY X-RAYS ARMS/LEGS: CPT

## 2019-08-12 PROCEDURE — C1876 STENT, NON-COA/NON-COV W/DEL: HCPCS | Performed by: SURGERY

## 2019-08-12 PROCEDURE — 25010000002 PROPOFOL 10 MG/ML EMULSION: Performed by: NURSE ANESTHETIST, CERTIFIED REGISTERED

## 2019-08-12 PROCEDURE — 25010000002 HEPARIN (PORCINE) PER 1000 UNITS: Performed by: SURGERY

## 2019-08-12 PROCEDURE — 25010000002 CEFAZOLIN PER 500 MG: Performed by: SURGERY

## 2019-08-12 PROCEDURE — 35371 RECHANNELING OF ARTERY: CPT | Performed by: SURGERY

## 2019-08-12 PROCEDURE — 25010000002 DEXAMETHASONE PER 1 MG: Performed by: ANESTHESIOLOGY

## 2019-08-12 PROCEDURE — 04CJ3ZZ EXTIRPATION OF MATTER FROM LEFT EXTERNAL ILIAC ARTERY, PERCUTANEOUS APPROACH: ICD-10-PCS | Performed by: SURGERY

## 2019-08-12 PROCEDURE — 047H3DZ DILATION OF RIGHT EXTERNAL ILIAC ARTERY WITH INTRALUMINAL DEVICE, PERCUTANEOUS APPROACH: ICD-10-PCS | Performed by: SURGERY

## 2019-08-12 PROCEDURE — 88311 DECALCIFY TISSUE: CPT | Performed by: SURGERY

## 2019-08-12 PROCEDURE — 88304 TISSUE EXAM BY PATHOLOGIST: CPT | Performed by: SURGERY

## 2019-08-12 PROCEDURE — 99222 1ST HOSP IP/OBS MODERATE 55: CPT | Performed by: INTERNAL MEDICINE

## 2019-08-12 PROCEDURE — 04UL0KZ SUPPLEMENT LEFT FEMORAL ARTERY WITH NONAUTOLOGOUS TISSUE SUBSTITUTE, OPEN APPROACH: ICD-10-PCS | Performed by: SURGERY

## 2019-08-12 PROCEDURE — 25010000002 HEPARIN (PORCINE) PER 1000 UNITS: Performed by: NURSE ANESTHETIST, CERTIFIED REGISTERED

## 2019-08-12 PROCEDURE — C1768 GRAFT, VASCULAR: HCPCS | Performed by: SURGERY

## 2019-08-12 PROCEDURE — 04CD3ZZ EXTIRPATION OF MATTER FROM LEFT COMMON ILIAC ARTERY, PERCUTANEOUS APPROACH: ICD-10-PCS | Performed by: SURGERY

## 2019-08-12 PROCEDURE — C1725 CATH, TRANSLUMIN NON-LASER: HCPCS | Performed by: SURGERY

## 2019-08-12 PROCEDURE — 75710 ARTERY X-RAYS ARM/LEG: CPT | Performed by: SURGERY

## 2019-08-12 PROCEDURE — 25010000002 IOPAMIDOL 61 % SOLUTION: Performed by: SURGERY

## 2019-08-12 PROCEDURE — 37221 PR REVSC OPN/PRQ ILIAC ART W/STNT PLMT & ANGIOPLSTY: CPT | Performed by: SURGERY

## 2019-08-12 PROCEDURE — 25010000002 ONDANSETRON PER 1 MG: Performed by: INTERNAL MEDICINE

## 2019-08-12 PROCEDURE — 25010000002 FENTANYL CITRATE (PF) 100 MCG/2ML SOLUTION: Performed by: NURSE ANESTHETIST, CERTIFIED REGISTERED

## 2019-08-12 PROCEDURE — C1757 CATH, THROMBECTOMY/EMBOLECT: HCPCS | Performed by: SURGERY

## 2019-08-12 PROCEDURE — 80048 BASIC METABOLIC PNL TOTAL CA: CPT | Performed by: NURSE PRACTITIONER

## 2019-08-12 PROCEDURE — 04CL0ZZ EXTIRPATION OF MATTER FROM LEFT FEMORAL ARTERY, OPEN APPROACH: ICD-10-PCS | Performed by: SURGERY

## 2019-08-12 PROCEDURE — 85025 COMPLETE CBC W/AUTO DIFF WBC: CPT | Performed by: NURSE PRACTITIONER

## 2019-08-12 PROCEDURE — 76000 FLUOROSCOPY <1 HR PHYS/QHP: CPT

## 2019-08-12 PROCEDURE — 25010000002 IRON SUCROSE PER 1 MG: Performed by: NURSE PRACTITIONER

## 2019-08-12 DEVICE — PTCH VASC XENOSURE BIO 0.8X8CM: Type: IMPLANTABLE DEVICE | Site: GROIN | Status: FUNCTIONAL

## 2019-08-12 DEVICE — STNT PERIPH EVERFLEX ENTRUST 5F 7X80MM 120CM: Type: IMPLANTABLE DEVICE | Site: GROIN | Status: FUNCTIONAL

## 2019-08-12 RX ORDER — FLUMAZENIL 0.1 MG/ML
0.2 INJECTION INTRAVENOUS AS NEEDED
Status: DISCONTINUED | OUTPATIENT
Start: 2019-08-12 | End: 2019-08-12 | Stop reason: HOSPADM

## 2019-08-12 RX ORDER — SODIUM CHLORIDE 0.9 % (FLUSH) 0.9 %
3 SYRINGE (ML) INJECTION EVERY 12 HOURS SCHEDULED
Status: DISCONTINUED | OUTPATIENT
Start: 2019-08-12 | End: 2019-08-12 | Stop reason: HOSPADM

## 2019-08-12 RX ORDER — SODIUM CHLORIDE 0.9 % (FLUSH) 0.9 %
3 SYRINGE (ML) INJECTION AS NEEDED
Status: DISCONTINUED | OUTPATIENT
Start: 2019-08-12 | End: 2019-08-12 | Stop reason: HOSPADM

## 2019-08-12 RX ORDER — LABETALOL HYDROCHLORIDE 5 MG/ML
5 INJECTION, SOLUTION INTRAVENOUS
Status: DISCONTINUED | OUTPATIENT
Start: 2019-08-12 | End: 2019-08-12 | Stop reason: HOSPADM

## 2019-08-12 RX ORDER — ACETAMINOPHEN 500 MG
1000 TABLET ORAL ONCE
Status: COMPLETED | OUTPATIENT
Start: 2019-08-12 | End: 2019-08-12

## 2019-08-12 RX ORDER — ONDANSETRON 4 MG/1
4 TABLET, FILM COATED ORAL EVERY 6 HOURS PRN
Status: DISCONTINUED | OUTPATIENT
Start: 2019-08-12 | End: 2019-08-13 | Stop reason: HOSPADM

## 2019-08-12 RX ORDER — BUPIVACAINE HCL/0.9 % NACL/PF 0.1 %
2 PLASTIC BAG, INJECTION (ML) EPIDURAL EVERY 8 HOURS
Status: COMPLETED | OUTPATIENT
Start: 2019-08-12 | End: 2019-08-13

## 2019-08-12 RX ORDER — DEXAMETHASONE SODIUM PHOSPHATE 4 MG/ML
4 INJECTION, SOLUTION INTRA-ARTICULAR; INTRALESIONAL; INTRAMUSCULAR; INTRAVENOUS; SOFT TISSUE ONCE AS NEEDED
Status: COMPLETED | OUTPATIENT
Start: 2019-08-12 | End: 2019-08-12

## 2019-08-12 RX ORDER — HEPARIN SODIUM 1000 [USP'U]/ML
INJECTION, SOLUTION INTRAVENOUS; SUBCUTANEOUS AS NEEDED
Status: DISCONTINUED | OUTPATIENT
Start: 2019-08-12 | End: 2019-08-12 | Stop reason: SURG

## 2019-08-12 RX ORDER — MORPHINE SULFATE 2 MG/ML
2 INJECTION, SOLUTION INTRAMUSCULAR; INTRAVENOUS
Status: DISCONTINUED | OUTPATIENT
Start: 2019-08-12 | End: 2019-08-12 | Stop reason: HOSPADM

## 2019-08-12 RX ORDER — FENTANYL CITRATE 50 UG/ML
INJECTION, SOLUTION INTRAMUSCULAR; INTRAVENOUS AS NEEDED
Status: DISCONTINUED | OUTPATIENT
Start: 2019-08-12 | End: 2019-08-12 | Stop reason: SURG

## 2019-08-12 RX ORDER — NALOXONE HCL 0.4 MG/ML
0.04 VIAL (ML) INJECTION AS NEEDED
Status: DISCONTINUED | OUTPATIENT
Start: 2019-08-12 | End: 2019-08-12 | Stop reason: HOSPADM

## 2019-08-12 RX ORDER — SODIUM CHLORIDE 0.9 % (FLUSH) 0.9 %
3-10 SYRINGE (ML) INJECTION AS NEEDED
Status: DISCONTINUED | OUTPATIENT
Start: 2019-08-12 | End: 2019-08-13 | Stop reason: HOSPADM

## 2019-08-12 RX ORDER — CLOPIDOGREL BISULFATE 75 MG/1
150 TABLET ORAL ONCE
Status: COMPLETED | OUTPATIENT
Start: 2019-08-12 | End: 2019-08-12

## 2019-08-12 RX ORDER — PHENYLEPHRINE HCL IN 0.9% NACL 0.8MG/10ML
SYRINGE (ML) INTRAVENOUS AS NEEDED
Status: DISCONTINUED | OUTPATIENT
Start: 2019-08-12 | End: 2019-08-12 | Stop reason: SURG

## 2019-08-12 RX ORDER — HYDRALAZINE HYDROCHLORIDE 20 MG/ML
5 INJECTION INTRAMUSCULAR; INTRAVENOUS
Status: DISCONTINUED | OUTPATIENT
Start: 2019-08-12 | End: 2019-08-12 | Stop reason: HOSPADM

## 2019-08-12 RX ORDER — METOPROLOL TARTRATE 5 MG/5ML
INJECTION INTRAVENOUS AS NEEDED
Status: DISCONTINUED | OUTPATIENT
Start: 2019-08-12 | End: 2019-08-12 | Stop reason: SURG

## 2019-08-12 RX ORDER — SODIUM CHLORIDE, SODIUM LACTATE, POTASSIUM CHLORIDE, CALCIUM CHLORIDE 600; 310; 30; 20 MG/100ML; MG/100ML; MG/100ML; MG/100ML
100 INJECTION, SOLUTION INTRAVENOUS CONTINUOUS
Status: DISCONTINUED | OUTPATIENT
Start: 2019-08-12 | End: 2019-08-12

## 2019-08-12 RX ORDER — VASOPRESSIN 20 U/ML
INJECTION PARENTERAL AS NEEDED
Status: DISCONTINUED | OUTPATIENT
Start: 2019-08-12 | End: 2019-08-12 | Stop reason: SURG

## 2019-08-12 RX ORDER — CLOPIDOGREL BISULFATE 75 MG/1
75 TABLET ORAL DAILY
Status: DISCONTINUED | OUTPATIENT
Start: 2019-08-13 | End: 2019-08-13 | Stop reason: HOSPADM

## 2019-08-12 RX ORDER — OXYCODONE HYDROCHLORIDE AND ACETAMINOPHEN 5; 325 MG/1; MG/1
1 TABLET ORAL ONCE AS NEEDED
Status: DISCONTINUED | OUTPATIENT
Start: 2019-08-12 | End: 2019-08-12 | Stop reason: HOSPADM

## 2019-08-12 RX ORDER — SODIUM CHLORIDE, SODIUM LACTATE, POTASSIUM CHLORIDE, CALCIUM CHLORIDE 600; 310; 30; 20 MG/100ML; MG/100ML; MG/100ML; MG/100ML
1000 INJECTION, SOLUTION INTRAVENOUS CONTINUOUS
Status: DISCONTINUED | OUTPATIENT
Start: 2019-08-12 | End: 2019-08-12

## 2019-08-12 RX ORDER — SODIUM CHLORIDE 0.9 % (FLUSH) 0.9 %
1-10 SYRINGE (ML) INJECTION AS NEEDED
Status: DISCONTINUED | OUTPATIENT
Start: 2019-08-12 | End: 2019-08-12 | Stop reason: HOSPADM

## 2019-08-12 RX ORDER — LIDOCAINE HYDROCHLORIDE 20 MG/ML
INJECTION, SOLUTION INFILTRATION; PERINEURAL AS NEEDED
Status: DISCONTINUED | OUTPATIENT
Start: 2019-08-12 | End: 2019-08-12 | Stop reason: SURG

## 2019-08-12 RX ORDER — LIDOCAINE HYDROCHLORIDE 40 MG/ML
SOLUTION TOPICAL AS NEEDED
Status: DISCONTINUED | OUTPATIENT
Start: 2019-08-12 | End: 2019-08-12 | Stop reason: SURG

## 2019-08-12 RX ORDER — EPHEDRINE SULFATE 50 MG/ML
INJECTION INTRAVENOUS AS NEEDED
Status: DISCONTINUED | OUTPATIENT
Start: 2019-08-12 | End: 2019-08-12 | Stop reason: SURG

## 2019-08-12 RX ORDER — SODIUM CHLORIDE 9 MG/ML
75 INJECTION, SOLUTION INTRAVENOUS CONTINUOUS
Status: DISCONTINUED | OUTPATIENT
Start: 2019-08-12 | End: 2019-08-13 | Stop reason: HOSPADM

## 2019-08-12 RX ORDER — ONDANSETRON 2 MG/ML
4 INJECTION INTRAMUSCULAR; INTRAVENOUS AS NEEDED
Status: DISCONTINUED | OUTPATIENT
Start: 2019-08-12 | End: 2019-08-12 | Stop reason: HOSPADM

## 2019-08-12 RX ORDER — BUPIVACAINE HCL/0.9 % NACL/PF 0.1 %
2 PLASTIC BAG, INJECTION (ML) EPIDURAL ONCE
Status: COMPLETED | OUTPATIENT
Start: 2019-08-12 | End: 2019-08-12

## 2019-08-12 RX ORDER — PROPOFOL 10 MG/ML
VIAL (ML) INTRAVENOUS AS NEEDED
Status: DISCONTINUED | OUTPATIENT
Start: 2019-08-12 | End: 2019-08-12 | Stop reason: SURG

## 2019-08-12 RX ORDER — ONDANSETRON 2 MG/ML
4 INJECTION INTRAMUSCULAR; INTRAVENOUS EVERY 6 HOURS PRN
Status: DISCONTINUED | OUTPATIENT
Start: 2019-08-12 | End: 2019-08-13 | Stop reason: HOSPADM

## 2019-08-12 RX ORDER — SODIUM CHLORIDE 0.9 % (FLUSH) 0.9 %
3 SYRINGE (ML) INJECTION EVERY 12 HOURS SCHEDULED
Status: DISCONTINUED | OUTPATIENT
Start: 2019-08-12 | End: 2019-08-13 | Stop reason: HOSPADM

## 2019-08-12 RX ORDER — ACETAMINOPHEN 325 MG/1
650 TABLET ORAL EVERY 4 HOURS PRN
Status: DISCONTINUED | OUTPATIENT
Start: 2019-08-12 | End: 2019-08-13 | Stop reason: HOSPADM

## 2019-08-12 RX ORDER — NALOXONE HCL 0.4 MG/ML
0.4 VIAL (ML) INJECTION
Status: DISCONTINUED | OUTPATIENT
Start: 2019-08-12 | End: 2019-08-13 | Stop reason: HOSPADM

## 2019-08-12 RX ORDER — ROCURONIUM BROMIDE 10 MG/ML
INJECTION, SOLUTION INTRAVENOUS AS NEEDED
Status: DISCONTINUED | OUTPATIENT
Start: 2019-08-12 | End: 2019-08-12 | Stop reason: SURG

## 2019-08-12 RX ORDER — METOCLOPRAMIDE HYDROCHLORIDE 5 MG/ML
5 INJECTION INTRAMUSCULAR; INTRAVENOUS
Status: DISCONTINUED | OUTPATIENT
Start: 2019-08-12 | End: 2019-08-12 | Stop reason: HOSPADM

## 2019-08-12 RX ORDER — FENTANYL CITRATE 50 UG/ML
25 INJECTION, SOLUTION INTRAMUSCULAR; INTRAVENOUS AS NEEDED
Status: DISCONTINUED | OUTPATIENT
Start: 2019-08-12 | End: 2019-08-12 | Stop reason: HOSPADM

## 2019-08-12 RX ORDER — HYDROCODONE BITARTRATE AND ACETAMINOPHEN 5; 325 MG/1; MG/1
1 TABLET ORAL EVERY 4 HOURS PRN
Status: DISCONTINUED | OUTPATIENT
Start: 2019-08-12 | End: 2019-08-13 | Stop reason: HOSPADM

## 2019-08-12 RX ORDER — IPRATROPIUM BROMIDE AND ALBUTEROL SULFATE 2.5; .5 MG/3ML; MG/3ML
3 SOLUTION RESPIRATORY (INHALATION) ONCE AS NEEDED
Status: DISCONTINUED | OUTPATIENT
Start: 2019-08-12 | End: 2019-08-12 | Stop reason: HOSPADM

## 2019-08-12 RX ADMIN — LISINOPRIL 20 MG: 20 TABLET ORAL at 21:02

## 2019-08-12 RX ADMIN — ATORVASTATIN CALCIUM 10 MG: 10 TABLET, FILM COATED ORAL at 21:02

## 2019-08-12 RX ADMIN — CLOPIDOGREL 150 MG: 75 TABLET, FILM COATED ORAL at 16:51

## 2019-08-12 RX ADMIN — DEXAMETHASONE SODIUM PHOSPHATE 4 MG: 4 INJECTION, SOLUTION INTRAMUSCULAR; INTRAVENOUS at 12:39

## 2019-08-12 RX ADMIN — HEPARIN SODIUM 1000 UNITS: 1000 INJECTION, SOLUTION INTRAVENOUS; SUBCUTANEOUS at 14:47

## 2019-08-12 RX ADMIN — LIDOCAINE HYDROCHLORIDE 1 EACH: 40 SOLUTION TOPICAL at 13:23

## 2019-08-12 RX ADMIN — EPHEDRINE SULFATE 10 MG: 50 INJECTION INTRAVENOUS at 15:01

## 2019-08-12 RX ADMIN — METOPROLOL TARTRATE 1 MG: 5 INJECTION INTRAVENOUS at 13:58

## 2019-08-12 RX ADMIN — MEMANTINE HYDROCHLORIDE 10 MG: 5 TABLET, FILM COATED ORAL at 21:02

## 2019-08-12 RX ADMIN — SODIUM CHLORIDE, POTASSIUM CHLORIDE, SODIUM LACTATE AND CALCIUM CHLORIDE 100 ML/HR: 600; 310; 30; 20 INJECTION, SOLUTION INTRAVENOUS at 12:39

## 2019-08-12 RX ADMIN — VASOPRESSIN 2 UNITS: 20 INJECTION INTRAVENOUS at 13:29

## 2019-08-12 RX ADMIN — SODIUM CHLORIDE, PRESERVATIVE FREE 3 ML: 5 INJECTION INTRAVENOUS at 21:04

## 2019-08-12 RX ADMIN — Medication 40 MCG: at 15:01

## 2019-08-12 RX ADMIN — EPHEDRINE SULFATE 10 MG: 50 INJECTION INTRAVENOUS at 14:51

## 2019-08-12 RX ADMIN — LIDOCAINE HYDROCHLORIDE 100 MG: 20 INJECTION, SOLUTION INFILTRATION; PERINEURAL at 13:23

## 2019-08-12 RX ADMIN — DONEPEZIL HYDROCHLORIDE 10 MG: 10 TABLET, FILM COATED ORAL at 21:03

## 2019-08-12 RX ADMIN — SODIUM CHLORIDE 75 ML/HR: 9 INJECTION, SOLUTION INTRAVENOUS at 21:10

## 2019-08-12 RX ADMIN — Medication 40 MCG: at 14:51

## 2019-08-12 RX ADMIN — ROCURONIUM BROMIDE 50 MG: 10 INJECTION INTRAVENOUS at 13:23

## 2019-08-12 RX ADMIN — HEPARIN SODIUM 6000 UNITS: 1000 INJECTION, SOLUTION INTRAVENOUS; SUBCUTANEOUS at 13:55

## 2019-08-12 RX ADMIN — ACETAMINOPHEN 1000 MG: 500 TABLET, FILM COATED ORAL at 12:39

## 2019-08-12 RX ADMIN — SODIUM CHLORIDE, POTASSIUM CHLORIDE, SODIUM LACTATE AND CALCIUM CHLORIDE 1000 ML: 600; 310; 30; 20 INJECTION, SOLUTION INTRAVENOUS at 12:45

## 2019-08-12 RX ADMIN — FENTANYL CITRATE 50 MCG: 50 INJECTION, SOLUTION INTRAMUSCULAR; INTRAVENOUS at 13:35

## 2019-08-12 RX ADMIN — SODIUM CHLORIDE, POTASSIUM CHLORIDE, SODIUM LACTATE AND CALCIUM CHLORIDE 100 ML/HR: 600; 310; 30; 20 INJECTION, SOLUTION INTRAVENOUS at 18:00

## 2019-08-12 RX ADMIN — ACETAMINOPHEN 650 MG: 325 TABLET, FILM COATED ORAL at 07:25

## 2019-08-12 RX ADMIN — SODIUM CHLORIDE, PRESERVATIVE FREE 3 ML: 5 INJECTION INTRAVENOUS at 21:03

## 2019-08-12 RX ADMIN — Medication 2 G: at 13:30

## 2019-08-12 RX ADMIN — ONDANSETRON 4 MG: 2 INJECTION INTRAMUSCULAR; INTRAVENOUS at 07:25

## 2019-08-12 RX ADMIN — SODIUM CHLORIDE, PRESERVATIVE FREE 3 ML: 5 INJECTION INTRAVENOUS at 10:05

## 2019-08-12 RX ADMIN — EPHEDRINE SULFATE 10 MG: 50 INJECTION INTRAVENOUS at 13:42

## 2019-08-12 RX ADMIN — DOCUSATE SODIUM 100 MG: 100 CAPSULE, LIQUID FILLED ORAL at 21:02

## 2019-08-12 RX ADMIN — FENTANYL CITRATE 50 MCG: 50 INJECTION, SOLUTION INTRAMUSCULAR; INTRAVENOUS at 13:23

## 2019-08-12 RX ADMIN — SODIUM CHLORIDE 2 G: 9 INJECTION, SOLUTION INTRAVENOUS at 21:02

## 2019-08-12 RX ADMIN — PROPOFOL 150 MG: 10 INJECTION, EMULSION INTRAVENOUS at 13:23

## 2019-08-12 RX ADMIN — IRON SUCROSE 200 MG: 20 INJECTION, SOLUTION INTRAVENOUS at 10:04

## 2019-08-12 NOTE — PROGRESS NOTES
Continued Stay Note   Guido     Patient Name: Antony Duenas  MRN: [id number]  Today's Date: 8/12/2019    Admit Date: 8/10/2019    Discharge Plan     Row Name 08/12/19 1444       Plan    Plan Comments  Attempted to screen pt but he is currently in the OR. Will try again tomorrow.        Discharge Codes    No documentation.             KRISTEL Mendoza

## 2019-08-12 NOTE — NURSING NOTE
[facility] Note      Patient: Antony Duenas  MRN: [id number] : 1940         Problem List:   Patient Active Problem List    Diagnosis   • *Iliac artery occlusion (CMS/HCC) [I74.5]   • Hyperlipidemia [E78.5]   • History of colon cancer [Z85.038]   • Anemia [D64.9]   • Hypertension [I10]   • Arthritis [M19.90]   • PVD (peripheral vascular disease) (CMS/HCC) [I73.9]   • Prostate cancer (CMS/HCC) [C61]   • Cholelithiasis [K80.20]         Reason for Visit: Patient is an 79 y.o. male, being seen by [facility] for ostomy care and education.      Patient has had ostomy for 12 years and is having trouble with leaking.  Patient's daughter is at bedside and states he has dementia and has always been the person to care for stoma.  Wife is now taking care of it and they need education and help with supplies.         19 0900   Colostomy LLQ   Placement Date/Time: (c) 07 0000   Placed by External Staff?: Other [facility]  Colostomy Type: Descending/sigmoid;End  Location: LLQ   Stomal Appliance 2 piece;Intact;Drainable   Stoma Appearance round;flush with skin;moist;pink   Peristomal Assessment CECE  (appliance in place)   Accessories/Skin Care wafer barrier over peristomal skin   Stoma Function flatus;stool   Stool Color brown;green   Stool Consistency soft     Patient is currently using a 2-piece no filter with a clamp.  I discussed different supplies due to the difficulty with getting a good seal, the bag filling with gas, and ease of use.      Recommendations:  Switch to a convex barrier due to flat stoma, use pouch with filter and lock n' roll closure or closed ended bag depending on amount of stool passed.  Ordered supplies from central.  Plan to replace appliance prior to surgery.      )Chelsie Olivarez RN 2019

## 2019-08-12 NOTE — PROGRESS NOTES
[facility] Medicine Services  INPATIENT PROGRESS NOTE    Patient Name: Antony Duenas  Date of Admission: 8/10/2019  Today's Date: 08/12/19  Length of Stay: 2  Primary Care Physician: Ben Mae MD    Subjective   Chief Complaint: leg pain    HPI   Currently the patient is sitting up in the bed.  Family is at the bedside.  The patient is not having any pain.  The patient is doing well and he is anticipating surgery today.  Vascular surgery taking him to the OR for revascularization.  No complaints.      Review of Systems   Constitutional: Positive for activity change and fatigue. Negative for appetite change, chills and fever.   HENT: Negative for hearing loss, nosebleeds, tinnitus and trouble swallowing.    Eyes: Negative for visual disturbance.   Respiratory: Negative for cough, chest tightness, shortness of breath and wheezing.    Cardiovascular: Negative for chest pain, palpitations and leg swelling.   Gastrointestinal: Negative for abdominal distention, abdominal pain, blood in stool, constipation, diarrhea, nausea and vomiting.   Endocrine: Negative for cold intolerance, heat intolerance, polydipsia, polyphagia and polyuria.   Genitourinary: Negative for decreased urine volume, difficulty urinating, dysuria, flank pain, frequency and hematuria.   Musculoskeletal: Positive for myalgias. Negative for arthralgias and joint swelling.   Skin: Negative for rash.   Allergic/Immunologic: Negative for immunocompromised state.   Neurological: Positive for weakness. Negative for dizziness, syncope, light-headedness and headaches.   Hematological: Negative for adenopathy. Does not bruise/bleed easily.   Psychiatric/Behavioral: Negative for confusion and sleep disturbance. The patient is not nervous/anxious.       All pertinent negatives and positives are as above. All other systems have been reviewed and are negative unless otherwise stated.     Objective    Temp:  [97.5  °F (36.4 °C)-98.5 °F (36.9 °C)] 97.5 °F (36.4 °C)  Heart Rate:  [55-64] 64  Resp:  [16-18] 16  BP: (104-141)/(40-53) 116/45     Physical Exam   Constitutional: He is oriented to person, place, and time. He appears well-developed and well-nourished.   Sitting up in bed.  NAD.   HENT:   Head: Normocephalic and atraumatic.   Eyes: Conjunctivae and EOM are normal. Pupils are equal, round, and reactive to light.   Neck: Neck supple. No JVD present. No thyromegaly present.   Cardiovascular: Normal rate, regular rhythm, normal heart sounds and intact distal pulses. Exam reveals no gallop and no friction rub.   No murmur heard.  Pulmonary/Chest: Effort normal and breath sounds normal. No respiratory distress. He has no wheezes. He has no rales. He exhibits no tenderness.   Abdominal: Soft. Bowel sounds are normal. He exhibits no distension. There is no tenderness. There is no rebound and no guarding.   Musculoskeletal: Normal range of motion. He exhibits no edema, tenderness or deformity.   Lymphadenopathy:     He has no cervical adenopathy.   Neurological: He is alert and oriented to person, place, and time.   Skin: Skin is warm and dry. No rash noted.   Psychiatric: He has a normal mood and affect. His behavior is normal. Judgment and thought content normal.   Nursing note and vitals reviewed.    Results Review:  I have reviewed the labs, radiology results, and diagnostic studies.    Laboratory Data:   Results from last 7 days   Lab Units 08/12/19  0458 08/11/19  0633 08/10/19  1035   WBC 10*3/mm3 4.10 4.12 4.23   HEMOGLOBIN g/dL 10.7* 11.1* 11.6*   HEMATOCRIT % 32.0* 33.4* 34.7*   PLATELETS 10*3/mm3 280 283 318     Results from last 7 days   Lab Units 08/12/19  0458 08/11/19  0633 08/10/19  1035   SODIUM mmol/L 141 140 142   POTASSIUM mmol/L 4.0 3.9 3.8   CHLORIDE mmol/L 109 107 105   CO2 mmol/L 29.0 28.0 27.0   BUN mg/dL 11 10 10   CREATININE mg/dL 0.85 0.82 0.87   CALCIUM mg/dL 8.4 8.7 8.9   BILIRUBIN mg/dL  --  0.5  0.5   ALK PHOS U/L  --  41 41   ALT (SGPT) U/L  --  23 24   AST (SGOT) U/L  --  18 20   GLUCOSE mg/dL 80 77 113*     Radiology Data:   Imaging Results (last 24 hours)     Procedure Component Value Units Date/Time    US Venous Doppler Lower Extremity Left (duplex) [[id number]] Collected:  08/11/19 1048     Updated:  08/11/19 1051    Narrative:       History: Pain       Impression:       Impression: There is no evidence of deep venous thrombosis or  superficial thrombophlebitis of the left lower extremity.     Comments: Left lower extremity venous duplex exam was performed using  color Doppler flow, Doppler wave form analysis, and grayscale imaging,  with and without compression. There is no evidence of deep venous  thrombosis of the common femoral, superficial femoral, popliteal,  posterior tibial, and peroneal veins. There is no thrombus identified in  the saphenofemoral junction or the greater saphenous vein. There is no  evidence of clot in the right common femoral vein.     This report was finalized on 08/11/2019 10:48 by Dr. Michael Colin MD.    US Arterial Doppler Lower Extremity Left [[id number]] Collected:  08/11/19 1043     Updated:  08/11/19 1048    Narrative:          History: Leg pain     Comments: Grayscale imaging as well as color flow duplex were used to  evaluate the left lower extremity arterial systems.     On the left, the peak systolic velocity in the common femoral artery  measured 116cm/s. In the profunda femoris artery measured 21.4cm/s. In  the proximal SFA measured 97.2cm/s. In the mid SFA measured 48.7cm/s. In  the distal SFA measured 41.6cm/s. In the popliteal artery measured  34.6cm/s. In the posterior tibial artery measured 16.5cm/s. In the  anterior tibial artery measured 24cm/s. The peak systolic velocity in  the right common femoral artery measured 103 cm/s.       Impression:       Patent left lower extremity arterial system without evidence  of significant stenosis. There is  dampened waveforms bilaterally in the  common femoral arteries. This could indicate significant bilateral  aortoiliac inflow disease.  This report was finalized on 08/11/2019 10:45 by Dr. Michael Colin MD.        I have reviewed the patient's current medications.     Assessment/Plan     Active Hospital Problems    Diagnosis   • **Iliac artery occlusion (CMS/HCC)   • Anemia   • PVD (peripheral vascular disease) (CMS/HCC)   • Hyperlipidemia   • History of colon cancer   • Hypertension     Overview:   Left iliac occlusion-thrombosis.    Overview:   : [  ]     • Prostate cancer (CMS/HCC)     Plan:  1.  Lovenox on hold   2.  Revascularization today with Dr. Colin  3.  CBC and BMP in AM  4.  Venofer   5.  Cardiology to see per Dr. Colin for surgical risk stratification   6.  Supportive care    Discharge Planning: I expect the patient to be discharged to home in 1-2 days.    Florencio Reyna, TAB   08/12/19   8:18 AM

## 2019-08-12 NOTE — ANESTHESIA PREPROCEDURE EVALUATION
Anesthesia Evaluation     Patient summary reviewed   no history of anesthetic complications:  NPO Solid Status: > 8 hours  NPO Liquid Status: > 8 hours           Airway   Mallampati: I  TM distance: >3 FB  Neck ROM: full  Dental    (+) edentulous    Pulmonary - normal exam    breath sounds clear to auscultation  (+) a smoker (quit 10 yrs ago) Former,   (-) asthma, recent URI, sleep apnea  Cardiovascular - normal exam  Exercise tolerance: good (4-7 METS)    ECG reviewed  Patient on routine beta blocker  Rhythm: regular  Rate: normal    (+) hypertension, PVD, hyperlipidemia,   (-) pacemaker, past MI, angina, cardiac stents    ROS comment: Left femoral stents 6 yrs ago    Neuro/Psych  (+) dementia,     (-) seizures, TIA, CVA  GI/Hepatic/Renal/Endo    (-) GERD, liver disease, no renal disease, diabetes, hypothyroidism, hyperthyroidism    Musculoskeletal     Abdominal    Substance History      OB/GYN          Other      history of cancer (Prostate, Colon) remission                  Anesthesia Plan    ASA 3     general   (Metoprolol not given due to HR 50 bpm  Preop arterial line)  intravenous induction   Anesthetic plan, all risks, benefits, and alternatives have been provided, discussed and informed consent has been obtained with: patient.

## 2019-08-12 NOTE — CONSULTS
[facility] CONSULT NOTE    Referring Provider: Paulette West DO    Reason for Consultation: Cardiac Clearance    Chief complaint:   Chief Complaint   Patient presents with   • [place] Transfer DVT rule out       Subjective .     History of present illness:  Antony Duenas is a 79 y.o. male with history of colon cancer with colectomy, prostate cancer, hyperlipidemia, hypertension and iliac stents. Patient presented to the ER August 10 th for progressing leg pain. Patient states that the pain has been progressing for some time but got significantly worse over the past week. He was found to have occluded left common iliac, external iliac and left common femoral arteries. He was also found to have high grade stenosis of proximal right external iliac artery and right common femoral artery. He has been evaluated by Dr. Colin with plans for revascularization today with bilateral femoral endarterectomy with thrombectomy of left iliac stents. Patient has no previous cardiac history. He does state that he has previously seen Dr. Aranda (Cardiologist) many years ago for hypertension and had a normal stress test. Patient denies a history of coronary artery disease, heart failure or arrhythmia. He denies shortness of breath, chest pain, palpitations or leg swelling. Former smoker for 50 years- quit roughly 10 years ago.     History  Past Medical History:   Diagnosis Date   • Colon cancer (CMS/HCC)    • Hyperlipidemia    • Hypertension    • Peripheral vascular disease (CMS/HCC)    • Prostate cancer (CMS/HCC)    ,   Past Surgical History:   Procedure Laterality Date   • COLECTOMY PARTIAL / TOTAL     • COLONOSCOPY  08/02/2016   • PROSTATECTOMY     • TOTAL HIP ARTHROPLASTY Bilateral    • VASCULAR SURGERY     ,   Family History   Problem Relation Age of Onset   • Heart disease Mother    • Stroke Mother    • Dementia Mother    • Prostate cancer Father    • Breast cancer Sister    • Brain cancer Sister     • Lung cancer Sister    • No Known Problems Daughter    • Hypertension Son    • No Known Problems Maternal Grandmother    • No Known Problems Maternal Grandfather    • No Known Problems Paternal Grandmother    • No Known Problems Paternal Grandfather    ,   Social History     Tobacco Use   • Smoking status: Former Smoker     Packs/day: 1.00     Years: 50.00     Pack years: 50.00     Types: Cigarettes     Last attempt to quit: 2007     Years since quittin.4   • Smokeless tobacco: Never Used   Substance Use Topics   • Alcohol use: Yes     Comment: occ   • Drug use: No   ,     Medications    Prior to Admission medications    Medication Sig Start Date End Date Taking? Authorizing Provider   aspirin 81 MG tablet Take 81 mg by mouth Daily.   Yes Aston Hendricks MD   atorvastatin (LIPITOR) 10 MG tablet Take 10 mg by mouth Every Night.   Yes sAton Hendricks MD   Cholecalciferol (VITAMIN D3) 5000 units capsule capsule Take 5,000 Units by mouth Daily.   Yes Aston Hendricks MD   Cyanocobalamin (VITAMIN B-12 PO) Take 1 tablet by mouth Daily.   Yes Aston Hendricks MD   donepezil (ARICEPT) 10 MG tablet Take 1 tablet by mouth Every Night. 19  Yes Ben Mae MD   lisinopril (PRINIVIL,ZESTRIL) 20 MG tablet Take 20 mg by mouth Daily.   Yes Aston Hendricks MD   Melatonin 10 MG capsule Take 1 capsule by mouth Every Night.   Yes Ben Mae MD   memantine (NAMENDA) 10 MG tablet Take 1 tablet by mouth 2 (Two) Times a Day. 19  Yes Ben Mae MD   metoprolol tartrate (LOPRESSOR) 50 MG tablet Take 50 mg by mouth Daily.   Yes Asotn Hendricks MD   Probiotic Product (PROBIOTIC-10) capsule Take 1 capsule by mouth Daily.   Yes Aston Hendricks MD   trospium (SANCTURA) 20 MG tablet Take 20 mg by mouth 2 (Two) Times a Day.   Yes Aston Hendricks MD       Current Facility-Administered Medications   Medication Dose Route Frequency Provider Last  Rate Last Dose   • acetaminophen (TYLENOL) tablet 650 mg  650 mg Oral Q4H PRN Paulette West DO   650 mg at 08/12/19 0725   • aspirin chewable tablet 81 mg  81 mg Oral Daily Paulette West DO   81 mg at 08/11/19 0818   • atorvastatin (LIPITOR) tablet 10 mg  10 mg Oral Nightly Paulette West DO   10 mg at 08/11/19 2042   • ceFAZolin in 0.9% normal saline (ANCEF) IVPB solution 2 g  2 g Intravenous Once Michael Colin DO       • docusate sodium (COLACE) capsule 100 mg  100 mg Oral BID Paulette West DO   100 mg at 08/11/19 2042   • donepezil (ARICEPT) tablet 10 mg  10 mg Oral Nightly Paulette West DO   10 mg at 08/11/19 2042   • enoxaparin (LOVENOX) syringe 70 mg  1 mg/kg Subcutaneous Q12H Michael Colin DO   Stopped at 08/12/19 0600   • HYDROcodone-acetaminophen (NORCO) 5-325 MG per tablet 1 tablet  1 tablet Oral Q6H PRN Paulette West DO       • iron sucrose (VENOFER) 200 mg in sodium chloride 0.9 % 100 mL IVPB  200 mg Intravenous Q24H Florencio Reyna APRN   200 mg at 08/12/19 1004   • lisinopril (PRINIVIL,ZESTRIL) tablet 20 mg  20 mg Oral Nightly Luis Herman MD   20 mg at 08/11/19 2042   • memantine (NAMENDA) tablet 10 mg  10 mg Oral BID Paulette West DO   10 mg at 08/11/19 2042   • morphine injection 4 mg  4 mg Intravenous Once Erasto Anders MD       • ondansetron (ZOFRAN) injection 4 mg  4 mg Intravenous Once HourErasto de guzman MD       • ondansetron (ZOFRAN) injection 4 mg  4 mg Intravenous Q6H PRN Paulette West DO   4 mg at 08/12/19 0725   • oxybutynin XL (DITROPAN-XL) 24 hr tablet 10 mg  10 mg Oral Daily Paulette West DO   10 mg at 08/11/19 0818   • sodium chloride 0.9 % flush 3 mL  3 mL Intravenous Q12H Paulette West DO   3 mL at 08/12/19 1005   • sodium chloride 0.9 % flush 3-10 mL  3-10 mL Intravenous PRN Paulette West DO       • vitamin D3 capsule 5,000 Units  5,000 Units Oral Once per day on Mon Wed Fri Paulette West  DO Eliana           Allergies:  Patient has no known allergies.    Review of Systems  Review of Systems   Constitution: Negative for chills, decreased appetite, fever, malaise/fatigue, weight gain and weight loss.   HENT: Negative for nosebleeds.    Eyes: Negative for visual disturbance.   Cardiovascular: Positive for claudication. Negative for chest pain, dyspnea on exertion, leg swelling, near-syncope, orthopnea, palpitations, paroxysmal nocturnal dyspnea and syncope.        Bilateral leg pain   Respiratory: Negative for cough, hemoptysis, shortness of breath and snoring.    Endocrine: Negative for cold intolerance and heat intolerance.   Hematologic/Lymphatic: Negative for bleeding problem. Does not bruise/bleed easily.   Skin: Negative for rash.   Musculoskeletal: Negative for back pain and falls.   Gastrointestinal: Negative for abdominal pain, constipation, diarrhea, heartburn, melena, nausea and vomiting.   Genitourinary: Negative for hematuria.   Neurological: Negative for dizziness, headaches and light-headedness.   Psychiatric/Behavioral: Negative for altered mental status.   Allergic/Immunologic: Negative for persistent infections.       Objective     Physical Exam:  Patient Vitals for the past 24 hrs:   BP Temp Temp src Pulse Resp SpO2   08/12/19 0751 116/45 97.5 °F (36.4 °C) Oral 64 16 98 %   08/12/19 0410 141/53 97.7 °F (36.5 °C) Oral 55 16 95 %   08/12/19 0003 132/53 98 °F (36.7 °C) Oral 62 16 97 %   08/11/19 1951 104/40 98.5 °F (36.9 °C) Oral 62 18 95 %   08/11/19 1644 127/48 -- -- 58 18 98 %   08/11/19 1106 110/43 98.4 °F (36.9 °C) Oral 61 18 97 %     Physical Exam   Constitutional: He is oriented to person, place, and time. He appears well-developed and well-nourished.   HENT:   Head: Normocephalic and atraumatic.   Hard of hearing   Eyes: Pupils are equal, round, and reactive to light.   Neck: Normal range of motion. Neck supple. No JVD present. Carotid bruit is not present.   Cardiovascular:  Normal rate, regular rhythm, normal heart sounds and intact distal pulses.   Pulmonary/Chest: Effort normal and breath sounds normal.   Abdominal: Soft. Bowel sounds are normal.   Musculoskeletal: Normal range of motion.   Neurological: He is alert and oriented to person, place, and time. He has normal reflexes.   Skin: Skin is warm and dry.   Psychiatric: He has a normal mood and affect. His behavior is normal. Judgment and thought content normal.       Results Review:   I reviewed the patient's new clinical results.  Lab Results (last 72 hours)     Procedure Component Value Units Date/Time    Basic Metabolic Panel [[id number]]  (Abnormal) Collected:  08/12/19 0458    Specimen:  Blood Updated:  08/12/19 0529     Glucose 80 mg/dL      BUN 11 mg/dL      Creatinine 0.85 mg/dL      Sodium 141 mmol/L      Potassium 4.0 mmol/L      Chloride 109 mmol/L      CO2 29.0 mmol/L      Calcium 8.4 mg/dL      eGFR Non African Amer 87 mL/min/1.73      BUN/Creatinine Ratio 12.9     Anion Gap 3.0 mmol/L     Narrative:       GFR Normal >60  Chronic Kidney Disease <60  Kidney Failure <15    CBC & Differential [[id number]] Collected:  08/12/19 0458    Specimen:  Blood Updated:  08/12/19 0511    Narrative:       The following orders were created for panel order CBC & Differential.  Procedure                               Abnormality         Status                     ---------                               -----------         ------                     CBC Auto Differential[[id number]]        Abnormal            Final result                 Please view results for these tests on the individual orders.    CBC Auto Differential [[id number]]  (Abnormal) Collected:  08/12/19 0458    Specimen:  Blood Updated:  08/12/19 0511     WBC 4.10 10*3/mm3      RBC 3.46 10*6/mm3      Hemoglobin 10.7 g/dL      Hematocrit 32.0 %      MCV 92.5 fL      MCH 30.9 pg      MCHC 33.4 g/dL      RDW 15.2 %      RDW-SD 52.0 fl      MPV 10.7 fL      Platelets 280  10*3/mm3      Neutrophil % 64.9 %      Lymphocyte % 21.5 %      Monocyte % 8.8 %      Eosinophil % 3.4 %      Basophil % 1.2 %      Immature Grans % 0.2 %      Neutrophils, Absolute 2.66 10*3/mm3      Lymphocytes, Absolute 0.88 10*3/mm3      Monocytes, Absolute 0.36 10*3/mm3      Eosinophils, Absolute 0.14 10*3/mm3      Basophils, Absolute 0.05 10*3/mm3      Immature Grans, Absolute 0.01 10*3/mm3      nRBC 0.0 /100 WBC     POC Glucose Once [[id number]]  (Normal) Collected:  08/11/19 1313    Specimen:  Blood Updated:  08/11/19 1325     Glucose 126 mg/dL      Comment: : [id number] Vincent (Kashif) MalloryMeter ID: [id number]       Iron Profile [[id number]]  (Abnormal) Collected:  08/11/19 0633    Specimen:  Blood Updated:  08/11/19 1253     Iron 31 mcg/dL      TIBC 242 mcg/dL      Iron Saturation 13 %     Hemoglobin A1c [[id number]]  (Normal) Collected:  08/11/19 0633    Specimen:  Blood Updated:  08/11/19 0809     Hemoglobin A1C 5.0 %     Narrative:       Less than 6.0           Non-Diabetic Range  6.0-7.0                 ADA Therapeutic Target  Greater than 7.0        Action Suggested    TSH [[id number]]  (Normal) Collected:  08/11/19 0633    Specimen:  Blood Updated:  08/11/19 0757     TSH 0.794 mIU/mL     Lipid Panel [[id number]]  (Abnormal) Collected:  08/11/19 0633    Specimen:  Blood Updated:  08/11/19 0737     Total Cholesterol 79 mg/dL      Triglycerides 78 mg/dL      HDL Cholesterol 33 mg/dL      LDL Cholesterol  46 mg/dL      LDL/HDL Ratio 0.92    Comprehensive Metabolic Panel [[id number]]  (Abnormal) Collected:  08/11/19 0633    Specimen:  Blood Updated:  08/11/19 0727     Glucose 77 mg/dL      BUN 10 mg/dL      Creatinine 0.82 mg/dL      Sodium 140 mmol/L      Potassium 3.9 mmol/L      Chloride 107 mmol/L      CO2 28.0 mmol/L      Calcium 8.7 mg/dL      Total Protein 5.8 g/dL      Albumin 3.00 g/dL      ALT (SGPT) 23 U/L      AST (SGOT) 18 U/L      Alkaline Phosphatase 41 U/L      Total Bilirubin 0.5  mg/dL      eGFR Non African Amer 91 mL/min/1.73      Globulin 2.8 gm/dL      A/G Ratio 1.1 g/dL      BUN/Creatinine Ratio 12.2     Anion Gap 5.0 mmol/L     Narrative:       GFR Normal >60  Chronic Kidney Disease <60  Kidney Failure <15    CBC & Differential [[id number]] Collected:  08/11/19 0633    Specimen:  Blood Updated:  08/11/19 0718    Narrative:       The following orders were created for panel order CBC & Differential.  Procedure                               Abnormality         Status                     ---------                               -----------         ------                     CBC Auto Differential[[id number]]        Abnormal            Final result                 Please view results for these tests on the individual orders.    CBC Auto Differential [[id number]]  (Abnormal) Collected:  08/11/19 0633    Specimen:  Blood Updated:  08/11/19 0718     WBC 4.12 10*3/mm3      RBC 3.63 10*6/mm3      Hemoglobin 11.1 g/dL      Hematocrit 33.4 %      MCV 92.0 fL      MCH 30.6 pg      MCHC 33.2 g/dL      RDW 15.0 %      RDW-SD 50.4 fl      MPV 11.1 fL      Platelets 283 10*3/mm3      Neutrophil % 70.2 %      Lymphocyte % 16.5 %      Monocyte % 9.0 %      Eosinophil % 2.9 %      Basophil % 1.2 %      Immature Grans % 0.2 %      Neutrophils, Absolute 2.89 10*3/mm3      Lymphocytes, Absolute 0.68 10*3/mm3      Monocytes, Absolute 0.37 10*3/mm3      Eosinophils, Absolute 0.12 10*3/mm3      Basophils, Absolute 0.05 10*3/mm3      Immature Grans, Absolute 0.01 10*3/mm3      nRBC 0.0 /100 WBC     Comprehensive Metabolic Panel [[id number]]  (Abnormal) Collected:  08/10/19 1035    Specimen:  Blood Updated:  08/10/19 1105     Glucose 113 mg/dL      BUN 10 mg/dL      Creatinine 0.87 mg/dL      Sodium 142 mmol/L      Potassium 3.8 mmol/L      Chloride 105 mmol/L      CO2 27.0 mmol/L      Calcium 8.9 mg/dL      Total Protein 6.2 g/dL      Albumin 3.40 g/dL      ALT (SGPT) 24 U/L      AST (SGOT) 20 U/L      Alkaline  Phosphatase 41 U/L      Total Bilirubin 0.5 mg/dL      eGFR Non African Amer 85 mL/min/1.73      Globulin 2.8 gm/dL      A/G Ratio 1.2 g/dL      BUN/Creatinine Ratio 11.5     Anion Gap 10.0 mmol/L     Narrative:       GFR Normal >60  Chronic Kidney Disease <60  Kidney Failure <15    CBC & Differential [[id number]] Collected:  08/10/19 1035    Specimen:  Blood Updated:  08/10/19 1042    Narrative:       The following orders were created for panel order CBC & Differential.  Procedure                               Abnormality         Status                     ---------                               -----------         ------                     CBC Auto Differential[[id number]]        Abnormal            Final result                 Please view results for these tests on the individual orders.    CBC Auto Differential [[id number]]  (Abnormal) Collected:  08/10/19 1035    Specimen:  Blood Updated:  08/10/19 1042     WBC 4.23 10*3/mm3      RBC 3.82 10*6/mm3      Hemoglobin 11.6 g/dL      Hematocrit 34.7 %      MCV 90.8 fL      MCH 30.4 pg      MCHC 33.4 g/dL      RDW 15.1 %      RDW-SD 49.1 fl      MPV 10.6 fL      Platelets 318 10*3/mm3      Neutrophil % 78.8 %      Lymphocyte % 11.3 %      Monocyte % 6.9 %      Eosinophil % 1.9 %      Basophil % 0.9 %      Immature Grans % 0.2 %      Neutrophils, Absolute 3.33 10*3/mm3      Lymphocytes, Absolute 0.48 10*3/mm3      Monocytes, Absolute 0.29 10*3/mm3      Eosinophils, Absolute 0.08 10*3/mm3      Basophils, Absolute 0.04 10*3/mm3      Immature Grans, Absolute 0.01 10*3/mm3      nRBC 0.0 /100 WBC     Protime-INR [[id number]]  (Normal) Collected:  08/10/19 0219    Specimen:  Blood Updated:  08/10/19 0244     Protime 14.4 Seconds      INR 1.08    aPTT [[id number]]  (Abnormal) Collected:  08/10/19 0219    Specimen:  Blood Updated:  08/10/19 0244     PTT 38.8 seconds           No results found for: ECHOEFEST    Imaging Results (last 72 hours)     Procedure Component Value Units  Date/Time    US Venous Doppler Lower Extremity Left (duplex) [[id number]] Collected:  08/11/19 1048     Updated:  08/11/19 1051    Narrative:       History: Pain       Impression:       Impression: There is no evidence of deep venous thrombosis or  superficial thrombophlebitis of the left lower extremity.     Comments: Left lower extremity venous duplex exam was performed using  color Doppler flow, Doppler wave form analysis, and grayscale imaging,  with and without compression. There is no evidence of deep venous  thrombosis of the common femoral, superficial femoral, popliteal,  posterior tibial, and peroneal veins. There is no thrombus identified in  the saphenofemoral junction or the greater saphenous vein. There is no  evidence of clot in the right common femoral vein.     This report was finalized on 08/11/2019 10:48 by Dr. Michael Colin MD.    US Arterial Doppler Lower Extremity Left [[id number]] Collected:  08/11/19 1043     Updated:  08/11/19 1048    Narrative:          History: Leg pain     Comments: Grayscale imaging as well as color flow duplex were used to  evaluate the left lower extremity arterial systems.     On the left, the peak systolic velocity in the common femoral artery  measured 116cm/s. In the profunda femoris artery measured 21.4cm/s. In  the proximal SFA measured 97.2cm/s. In the mid SFA measured 48.7cm/s. In  the distal SFA measured 41.6cm/s. In the popliteal artery measured  34.6cm/s. In the posterior tibial artery measured 16.5cm/s. In the  anterior tibial artery measured 24cm/s. The peak systolic velocity in  the right common femoral artery measured 103 cm/s.       Impression:       Patent left lower extremity arterial system without evidence  of significant stenosis. There is dampened waveforms bilaterally in the  common femoral arteries. This could indicate significant bilateral  aortoiliac inflow disease.  This report was finalized on 08/11/2019 10:45 by Dr. Michael Colin MD.     XR Hip With or Without Pelvis 2 - 3 View Left [[id number]] Collected:  08/10/19 0735     Updated:  08/10/19 0739    Narrative:       XR HIP W OR WO PELVIS 2-3 VIEW LEFT- 8/10/2019 3:00 AM CDT     HISTORY: LEFT hip pain     COMPARISON: CT 08/10/2019      Findings:   Frontal and lateral views of the left hip were obtained. Frontal view of  the pelvis was also obtained.      There is no fracture or dislocation. A LEFT hip arthroplasty is seen.  The sacroiliac joints are patent. A LEFT iliac artery stent is present.  Diffuse atherosclerosis is noted. A LEFT lower quadrant ostomy is  present.        Impression:       1. No evidence of acute bony abnormality in the pelvis or LEFT hip.        This report was finalized on 08/10/2019 07:36 by Dr. Waldemar Love MD.    CT Angiogram Abdomen Pelvis With & Without Contrast [[id number]] Collected:  08/10/19 0704     Updated:  08/10/19 0716    Narrative:       CT ANGIOGRAM ABDOMEN PELVIS W WO CONTRAST- 8/10/2019 3:57 AM CDT     HISTORY: rule out arterial occlusion (abnormal Ultrasound of leg)     COMPARISON: None     DOSE LENGTH PRODUCT: 694 mGy cm. Automated exposure control was also  utilized to decrease patient radiation dose.     TECHNIQUE: Helical tomographic images of the abdomen and pelvis  utilizing angiographic protocol were obtained following the intravenous  infusion of contrast. Multiplanar and 3 D reformatted images were  provided for review.     FINDINGS:     Angiogram:  There is marked atherosclerosis in the aorta without evidence of  aneurysm. Atherosclerotic calcifications are also noted in the celiac  artery and its branches and the SMA. There is no significant stenosis of  the structures.     Atherosclerosis is seen in the renal artery origins, but there is no  significant stenosis or aneurysm.     There is complete occlusion of the LEFT common iliac, external iliac,  and common femoral artery. Reconstitution of flow is seen at the LEFT  femoral bifurcation. An  iliac artery stent is seen.     There moderate to marked atherosclerosis is noted in the RIGHT iliac and  femoral arteries, as well, but there is no occlusion. Moderate to marked  atherosclerotic stenosis of the RIGHT external iliac artery is noted.  There is also critical stenosis of the RIGHT common femoral artery on  axial image 169.     The internal iliac arteries are occluded.     Other findings:  Emphysematous changes are seen in the lower lungs. A small gallstone is  noted. No acute process is seen in the liver, pancreas, kidneys, adrenal  glands, spleen, or GI tract. A moderate to large hydrocele is noted.  There is a fluid collection in the region of the urethra, possibly due  to a large diverticulum. There is an age-indeterminate compression  fracture in the L2 vertebral body. The bones are osteopenic. A LEFT  lower quadrant colostomy is noted.       Impression:       1. Occluded LEFT common iliac, external iliac, and LEFT common femoral  arteries. No aneurysm.  2. High-grade stenoses of the proximal RIGHT external iliac artery and  the RIGHT common femoral artery.  3. Cholelithiasis.  4. Age-indeterminate L2 vertebral body fracture with approximately 30%  vertebral body height loss.  5. 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     A preliminary report was provided by [facility]. I  agree with the preliminary interpretation.     This report was finalized on 08/10/2019 07:12 by Dr. Waldemar Love MD.        Assessment/Plan       Iliac artery occlusion (CMS/HCC)    Prostate cancer (CMS/HCC)    Hyperlipidemia    History of colon cancer    Anemia    Hypertension    PVD (peripheral vascular disease) (CMS/HCC)    Plan:  Risk assessment- from a revised cardiac risk index standpoint patient has a 0.9% risk of major cardiac event. This is primarily because he has no known history of coronary/ischemic heart disease, cerebrovascular disease, congestive heart  "failure, insulin-dependent diabetes mellitus, or creatinine greater than 2. This is considered a high risk procedure. Though he does have 0.9% risk, this is non-modifiable because he has no signs or symptoms of the following \"active cardiac conditions\"- acute coronary syndrome, acutely decompensated congestive heart failure, uncontrolled arrhythmias, or significant valvular disease. Therefore, recommend proceeding with the planned surgery without further delay.    Further orders per Dr. Ibarra    Thank you for asking us to follow this patient with you.     Donato Nogueira, TAB  08/12/19  10:14 AM      "

## 2019-08-12 NOTE — OP NOTE
Antony Duenas  8/12/2019     PREOPERATIVE DIAGNOSIS:1.  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  Left common femoral artery occlusion  3.  Severe aortoiliac occlusive disease  4.  Severe disabling bilateral lower extremity claudication    POSTOPERATIVE DIAGNOSIS: Post-Op Diagnosis Codes:  Same     PROCEDURE PERFORMED:   1.  Left common femoral artery cutdown/exposure  2.  Left common femoral artery endarterectomy with bovine patch angioplasty with eversion profundoplasty  3.  Thrombectomy of the left common iliac artery and external iliac artery with a #4 and #5 Joshua catheters  4.  Selective cannulation of the right common iliac artery  5.  Balloon angioplasty of the right external iliac artery with a 6 x 60 mm ever cross balloon  6.  Stenting of the right external iliac artery with a 7 x 80 mm ever flex stent  7.  Completion aortoiliac angiogram with bilateral lower extremity runoff with radiographic supervision and interpretation     SURGEON: Michael Colin DO      ANESTHESIA: General.    PREPARATION: Routine.    STAFF: Circulator: Gwen Martel RN  Scrub Person: Chloe Singh; Cheli Garcia Jessica  Assistant: Didi Nunez  Vascular Radiology Technician: Rebecca Duenas    ESTIMATED BLOOD LOSS: 500 mL    SPECIMENS: Clot/plaque    COMPLICATIONS: None    INDICATIONS: Antony Duenas is a 79 y.o. male who presented with complaints of bilateral lower extremity pain, (left greater than right) worsening over the past week.  He has intact motor function.  There is some decrease in sensation. He did have left arterial duplex and a CTA of the abdomen and pelvis, which I did closely review.   He has had previous vascular surgery done at [facility] back in 2014 in which she had to left iliac stents placed.  He has not had any further follow-up since then.  The indications, risks, and possible complications of the procedure were explained to the patient, who voiced understanding and  wished to proceed with surgery.     PROCEDURE IN DETAIL: The patient was taken to the operating room and placed on the operating table in a supine position. After general anesthesia was obtained, the bilateral groins was prepped and draped in a sterile manner.  An oblique incision was made in the left groin overlying the inguinal ligament.  Careful dissection was made down through the subcutaneous tissues using the Bovie cautery to ensure hemostasis.  Any crossing veins were ligated with hemoclips.  The inguinal ligament was identified and just inferior to that the common femoral sheath was entered with the Metzenbaum scissors.  The common femoral artery was identified and dissected proximally and distally until full exposure was established.  Proximal and distal control was established with Vesseloops.  The patient was given 7000 units of intravenous heparin.  The arteries were clamped proximally and distally.  Using an 11 blade an arteriotomy was made in the common femoral artery.  It was extended proximally and distally with the Ballesteros scissors.  There was a significant amount of plaque burden and clot present.  A #4 Joshua was passed under fluoroscopic guidance up into the aorta and retrieved through the left iliac system.  A large amount of clot was retrieved.  This was done 2 separate times and then a #5 Joshua was placed and retrieved 2 separate times establishing excellent inflow.  A retrograde angiogram was performed from the groin which showed a widely patent iliac system/stents.  There was no evidence of any significant stenosis or occlusion.  Next, using the freer elevator the standard endarterectomy was performed of the common femoral artery.  An eversion profundoplasty was also performed.  Heparinized saline was used to irrigate the wound bed and all loose debris was picked cleaned.  Next, the bovine pericardial patch was brought to the field.  The patch anastomosis was performed with a 5-0 Prolene in  a running fashion.  Both sutures were brought to the midline.  Prior to completion of the patch anastomosis the appropriate flushing maneuvers were performed and the anastomosis was completed.  Flow was reestablished.  There was a strong palpable femoral pulse noted.  Next, the decision was made to cannulate the patch using a micropuncture technique.  The micro-sheath was placed.  The advantage Glidewire was advanced into the aorta under fluoroscopic guidance.  The Omni Flush catheter was advanced to the aortic bifurcation and selective cannulation was made in the right common iliac artery.  The catheter was placed in the proximal common iliac artery and an angiogram was performed.  The right common iliac artery was widely patent.  The right external iliac artery had tandem severe stenoses proximally and more distally near the femoral head.  The common femoral, profunda femoris, and proximal SFA were all widely patent without stenosis.  Next, the advantage Glidewire was cannulated down into the superficial femoral artery.  A 6 x 60 mm ever cross balloon was used to predilate the right external iliac artery.  Next, a 7 x 80 mm ever flex stent was placed across this entire lesion of the external iliac artery and deployed successfully.  It was postdilated with a 6 x 60 mm balloon.  Completion angiogram was performed showed a widely patent stent without any residual stenosis.  Completion angiogram of the lower extremity showed two-vessel runoff to the foot via the anterior tibial and peroneal arteries.  The posterior tibial artery was occluded from the takeoff.  Next, the catheter was then pulled back up over the aortic bifurcation under fluoroscopic guidance and an angiogram was performed of the entire left leg which showed a widely patent common femoral endarterectomy site with widely patent profunda femoris artery.  The SFA, popliteal, and three-vessel runoff was present.  The posterior tibial artery became atretic  in the distal calf.  The catheter and wire were removed and a 5-0 Prolene was used to close the cannulation site.  The wound bed was irrigated with antibiotic saline and hemostasis was observed.  Gelfoam and thrombin was also used to help ensure hemostasis.  The deep layers were then closed with 2 separate layers of 2-0 Vicryl in a running fashion.  The subcutaneous layers were closed with a 3-0 Vicryl in a running fashion.  The skin was then reapproximated using a 4 Monocryl in a subcuticular fashion.  Sterile dressings were applied. The patient tolerated the procedure well. Sponge and needle counts were correct. The patient was then awakened and extubated in the operating room and taken to the recovery room in good condition.    Michael Colin,   Date: 8/12/2019 Time: 3:37 PM     CC:Ben Mae MD

## 2019-08-12 NOTE — NURSING NOTE
DR PIKE AT  TO CHECK ON PT==PEDAL PULSES 2+-3+ WITH DOPPLER. NOTED PURPLISH PIN PRICK AREAS ON LES, LEFT MORE THAN RIGHT. STATED POSSIBLE VASCULITIS.

## 2019-08-12 NOTE — ANESTHESIA PROCEDURE NOTES
Arterial Line    Pre-sedation assessment completed: 8/12/2019 12:25 PM    Patient reassessed immediately prior to procedure    Patient location during procedure: holding area  Start time: 8/12/2019 12:26 PM  Stop Time:8/12/2019 12:28 PM       Line placed for ABGs/Labs/ISTAT and hemodynamic monitoring.  Performed By   Anesthesiologist: Raheel Beverly MD  Preanesthetic Checklist  Completed: patient identified, site marked, surgical consent, pre-op evaluation, timeout performed, IV checked, risks and benefits discussed and monitors and equipment checked  Arterial Line Prep   Sterile Tech: gloves  Prep: ChloraPrep  Patient monitoring: EKG, continuous pulse oximetry and blood pressure monitoring  Arterial Line Procedure   Laterality:right  Location:  radial artery  Catheter size: 20 G   Guidance: ultrasound guided  PROCEDURE NOTE/ULTRASOUND INTERPRETATION.  Using ultrasound guidance the potential vascular sites for insertion of the catheter were visualized to determine the patency of the vessel to be used for vascular access.  After selecting the appropriate site for insertion, the needle was visualized under ultrasound being inserted into the radial artery, followed by ultrasound confirmation of wire and catheter placement. There were no abnormalities seen on ultrasound; an image was taken; and the patient tolerated the procedure with no complications.   Number of attempts: 1  Successful placement: yes  Post Assessment   Dressing Type: wrist guard applied, secured with tape and occlusive dressing applied.   Complications no  Circ/Move/Sens Assessment: normal and unchanged.   Patient Tolerance: patient tolerated the procedure well with no apparent complications

## 2019-08-12 NOTE — ANESTHESIA PROCEDURE NOTES
Airway  Urgency: elective    Airway not difficult    General Information and Staff    Patient location during procedure: OR  CRNA: Joycelyn Kennedy CRNA    Indications and Patient Condition  Indications for airway management: airway protection    Preoxygenated: yes  MILS maintained throughout  Mask difficulty assessment: 1 - vent by mask    Final Airway Details  Final airway type: endotracheal airway      Successful airway: ETT  Cuffed: yes   Successful intubation technique: direct laryngoscopy  Facilitating devices/methods: intubating stylet  Endotracheal tube insertion site: oral  Blade: Marie  Blade size: 2  ETT size (mm): 7.5  Cormack-Lehane Classification: grade I - full view of glottis  Placement verified by: chest auscultation, capnometry and palpation of cuff   Cuff volume (mL): 8  Measured from: gums  ETT to gums (cm): 23  Number of attempts at approach: 1

## 2019-08-13 VITALS
WEIGHT: 164.1 LBS | HEIGHT: 74 IN | DIASTOLIC BLOOD PRESSURE: 46 MMHG | BODY MASS INDEX: 21.06 KG/M2 | SYSTOLIC BLOOD PRESSURE: 91 MMHG | HEART RATE: 62 BPM | OXYGEN SATURATION: 96 % | RESPIRATION RATE: 16 BRPM | TEMPERATURE: 98.3 F

## 2019-08-13 LAB
ANION GAP SERPL CALCULATED.3IONS-SCNC: 4 MMOL/L (ref 4–13)
BASOPHILS # BLD AUTO: 0.04 10*3/MM3 (ref 0–0.2)
BASOPHILS NFR BLD AUTO: 0.5 % (ref 0–1.5)
BUN BLD-MCNC: 9 MG/DL (ref 5–21)
BUN/CREAT SERPL: 11 (ref 7–25)
CALCIUM SPEC-SCNC: 8 MG/DL (ref 8.4–10.4)
CHLORIDE SERPL-SCNC: 107 MMOL/L (ref 98–110)
CO2 SERPL-SCNC: 28 MMOL/L (ref 24–31)
CREAT BLD-MCNC: 0.82 MG/DL (ref 0.5–1.4)
DEPRECATED RDW RBC AUTO: 51.8 FL (ref 37–54)
EOSINOPHIL # BLD AUTO: 0.03 10*3/MM3 (ref 0–0.4)
EOSINOPHIL NFR BLD AUTO: 0.4 % (ref 0.3–6.2)
ERYTHROCYTE [DISTWIDTH] IN BLOOD BY AUTOMATED COUNT: 15.2 % (ref 12.3–15.4)
GFR SERPL CREATININE-BSD FRML MDRD: 91 ML/MIN/1.73
GLUCOSE BLD-MCNC: 79 MG/DL (ref 70–100)
HCT VFR BLD AUTO: 28 % (ref 37.5–51)
HGB BLD-MCNC: 9.4 G/DL (ref 13–17.7)
IMM GRANULOCYTES # BLD AUTO: 0.02 10*3/MM3 (ref 0–0.05)
IMM GRANULOCYTES NFR BLD AUTO: 0.3 % (ref 0–0.5)
LYMPHOCYTES # BLD AUTO: 0.66 10*3/MM3 (ref 0.7–3.1)
LYMPHOCYTES NFR BLD AUTO: 9.1 % (ref 19.6–45.3)
MCH RBC QN AUTO: 31.1 PG (ref 26.6–33)
MCHC RBC AUTO-ENTMCNC: 33.6 G/DL (ref 31.5–35.7)
MCV RBC AUTO: 92.7 FL (ref 79–97)
MONOCYTES # BLD AUTO: 0.49 10*3/MM3 (ref 0.1–0.9)
MONOCYTES NFR BLD AUTO: 6.7 % (ref 5–12)
NEUTROPHILS # BLD AUTO: 6.05 10*3/MM3 (ref 1.7–7)
NEUTROPHILS NFR BLD AUTO: 83 % (ref 42.7–76)
NRBC BLD AUTO-RTO: 0 /100 WBC (ref 0–0.2)
PLATELET # BLD AUTO: 304 10*3/MM3 (ref 140–450)
PMV BLD AUTO: 10.7 FL (ref 6–12)
POTASSIUM BLD-SCNC: 4.4 MMOL/L (ref 3.5–5.3)
RBC # BLD AUTO: 3.02 10*6/MM3 (ref 4.14–5.8)
SODIUM BLD-SCNC: 139 MMOL/L (ref 135–145)
WBC NRBC COR # BLD: 7.29 10*3/MM3 (ref 3.4–10.8)

## 2019-08-13 PROCEDURE — 80048 BASIC METABOLIC PNL TOTAL CA: CPT | Performed by: NURSE PRACTITIONER

## 2019-08-13 PROCEDURE — 94760 N-INVAS EAR/PLS OXIMETRY 1: CPT

## 2019-08-13 PROCEDURE — 85025 COMPLETE CBC W/AUTO DIFF WBC: CPT | Performed by: SURGERY

## 2019-08-13 PROCEDURE — 25010000002 IRON SUCROSE PER 1 MG: Performed by: NURSE PRACTITIONER

## 2019-08-13 PROCEDURE — 94799 UNLISTED PULMONARY SVC/PX: CPT

## 2019-08-13 PROCEDURE — 25010000002 CEFAZOLIN PER 500 MG: Performed by: SURGERY

## 2019-08-13 PROCEDURE — 25010000002 ENOXAPARIN PER 10 MG: Performed by: SURGERY

## 2019-08-13 PROCEDURE — 99024 POSTOP FOLLOW-UP VISIT: CPT | Performed by: NURSE PRACTITIONER

## 2019-08-13 RX ORDER — FERROUS SULFATE TAB EC 324 MG (65 MG FE EQUIVALENT) 324 (65 FE) MG
324 TABLET DELAYED RESPONSE ORAL
Qty: 30 TABLET | Refills: 1 | Status: SHIPPED | OUTPATIENT
Start: 2019-08-13 | End: 2019-09-23 | Stop reason: SDUPTHER

## 2019-08-13 RX ORDER — CLOPIDOGREL BISULFATE 75 MG/1
75 TABLET ORAL DAILY
Qty: 30 TABLET | Refills: 5 | Status: SHIPPED | OUTPATIENT
Start: 2019-08-13 | End: 2020-01-01

## 2019-08-13 RX ADMIN — ASPIRIN 81 MG: 81 TABLET, CHEWABLE ORAL at 08:44

## 2019-08-13 RX ADMIN — MEMANTINE HYDROCHLORIDE 10 MG: 5 TABLET, FILM COATED ORAL at 08:44

## 2019-08-13 RX ADMIN — IRON SUCROSE 200 MG: 20 INJECTION, SOLUTION INTRAVENOUS at 08:43

## 2019-08-13 RX ADMIN — CLOPIDOGREL 75 MG: 75 TABLET, FILM COATED ORAL at 08:44

## 2019-08-13 RX ADMIN — OXYBUTYNIN CHLORIDE 10 MG: 5 TABLET, EXTENDED RELEASE ORAL at 08:43

## 2019-08-13 RX ADMIN — SODIUM CHLORIDE 2 G: 9 INJECTION, SOLUTION INTRAVENOUS at 05:09

## 2019-08-13 RX ADMIN — DOCUSATE SODIUM 100 MG: 100 CAPSULE, LIQUID FILLED ORAL at 08:44

## 2019-08-13 RX ADMIN — ENOXAPARIN SODIUM 70 MG: 80 INJECTION SUBCUTANEOUS at 05:11

## 2019-08-13 NOTE — PAYOR COMM NOTE
"[facility]  [place]  [phone]  OR   FAX  [phone]    REF: [id number]      iNsa Duenas (79 y.o. Male)     Date of Birth Social Security Number Address Home Phone MRN    1940  [id number] [facility] [address] [phone] [id number]    Christianity Marital Status          Yarsani        Admission Date Admission Type Admitting Provider Attending Provider Department, Room/Bed    8/10/19 Urgent Kike Isaacs MD  [facility] 3C, 384/1    Discharge Date Discharge Disposition Discharge Destination        8/13/2019 Home or Self Care              Attending Provider:  (none)   Allergies:  No Known Allergies    Isolation:  None   Infection:  None   Code Status:  CPR    Ht:  188 cm (74\")   Wt:  74.4 kg (164 lb 1.6 oz)    Admission Cmt:  None   Principal Problem:  Iliac artery occlusion (CMS/HCC) [I74.5]                 Active Insurance as of 8/10/2019     Primary Coverage     Payor Plan Insurance Group Employer/Plan Group    [facility] MEDICARE REPLACEMENT [facility] [id number]     Payor Plan Address Payor Plan Phone Number Payor Plan Fax Number Effective Dates    [address] [address]   1/1/2017 - None Entered    [facility] [id number]       Subscriber Name Subscriber Birth Date Member ID       NISA DUENAS 1940 [id number]                 Emergency Contacts      (Rel.) Home Phone Work Phone Mobile Phone    Ragini Duenas (Spouse) [phone] -- [phone]    Luana Narvaez (Daughter) [phone] -- [phone]    Schuyler Duenas (Son) [phone] -- [phone]            Discharge Summary     No notes of this type exist for this encounter.        "

## 2019-08-13 NOTE — PROGRESS NOTES
LOS: 3 days   Patient Care Team:  Ben Mae MD as PCP - General (Family Medicine)  Cheli Hernandez MD (Urology)  Alexander Wilhelm MD (Dermatology)  Nick Aranda MD as Consulting Physician (Cardiology)  Farrukh Almaguer MD as Consulting Physician (Hematology and Oncology)    Chief Complaint:  Post op LLE revacularization    Subjective     Patient seen/examined and is currently sitting up in the bed.  AVSS. Afebrile.  Family is at the bedside.  He denies any pain at this time.  His feet is nice and warm with Doppler signals.  Left groin is soft without hematoma.    Review of Systems   Constitutional: Positive for activity change and fatigue.   HENT: Negative.    Eyes: Negative.    Respiratory: Negative.    Cardiovascular: Negative.    Gastrointestinal: Negative.    Endocrine: Negative.    Genitourinary: Negative.    Musculoskeletal: Positive for myalgias.   Skin: Negative.    Allergic/Immunologic: Negative.    Neurological: Positive for weakness.   Hematological: Negative.    Psychiatric/Behavioral: Negative.    All other systems reviewed and are negative.      Objective     Vital Signs  Temp:  [97.1 °F (36.2 °C)-98.4 °F (36.9 °C)] 97.9 °F (36.6 °C)  Heart Rate:  [50-88] 68  Resp:  [13-18] 16  BP: ()/(36-67) 105/49  Arterial Line BP: ()/(40-46) 111/46    Physical Exam   Constitutional: He is oriented to person, place, and time. He appears well-developed and well-nourished.   HENT:   Head: Normocephalic and atraumatic.   Eyes: Pupils are equal, round, and reactive to light. No scleral icterus.   Neck: Normal range of motion. Neck supple. No thyromegaly present.   Cardiovascular: Normal rate, regular rhythm and normal heart sounds.   Doppler signals present bilaterally, feet are nice and warm   Pulmonary/Chest: Effort normal and breath sounds normal.   Abdominal: Soft. Bowel sounds are normal.   Musculoskeletal: Normal range of motion.   Neurological: He is alert and oriented to  person, place, and time.   Skin: Skin is warm and dry.   Psychiatric: He has a normal mood and affect. His behavior is normal. Judgment and thought content normal.   Nursing note and vitals reviewed.      Laboratory Data:   Results from last 7 days   Lab Units 08/13/19  0600 08/12/19  0458 08/11/19  0633   WBC 10*3/mm3 7.29 4.10 4.12   HEMOGLOBIN g/dL 9.4* 10.7* 11.1*   HEMATOCRIT % 28.0* 32.0* 33.4*   PLATELETS 10*3/mm3 304 280 283       Results from last 7 days   Lab Units 08/13/19  0600 08/12/19  0458 08/11/19  0633 08/10/19  1035   SODIUM mmol/L 139 141 140 142   POTASSIUM mmol/L 4.4 4.0 3.9 3.8   CHLORIDE mmol/L 107 109 107 105   CO2 mmol/L 28.0 29.0 28.0 27.0   BUN mg/dL 9 11 10 10   CREATININE mg/dL 0.82 0.85 0.82 0.87   CALCIUM mg/dL 8.0* 8.4 8.7 8.9   BILIRUBIN mg/dL  --   --  0.5 0.5   ALK PHOS U/L  --   --  41 41   ALT (SGPT) U/L  --   --  23 24   AST (SGOT) U/L  --   --  18 20   GLUCOSE mg/dL 79 80 77 113*     Results from last 7 days   Lab Units 08/10/19  0219   INR  1.08   APTT seconds 38.8*            Medication Review: Reviewed    Assessment/Plan       Iliac artery occlusion (CMS/HCC)    Prostate cancer (CMS/HCC)    Hyperlipidemia    History of colon cancer    Anemia    Hypertension    PVD (peripheral vascular disease) (CMS/HCC)      Plan:  1.  Discontinue Lovenox  2.  Okay to discharge from a vascular standpoint  3.  Follow-up in 2 weeks  4.  May be up out of bed    TAB Perez  Vascular Surgery  [phone]  08/13/19  7:31 AM

## 2019-08-13 NOTE — DISCHARGE SUMMARY
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    Overview:   : [  ]     • Prostate cancer (CMS/HCC)     Consults:   1.  Vascular, Dr. Colin    Procedures Performed:   PROCEDURE PERFORMED on 8/12/2019:   1.  Left common femoral artery cutdown/exposure  2.  Left common femoral artery endarterectomy with bovine patch angioplasty with eversion profundoplasty  3.  Thrombectomy of the left common iliac artery and external iliac artery with a #4 and #5 Joshua catheters  4.  Selective cannulation of the right common iliac artery  5.  Balloon angioplasty of the right external iliac artery with a 6 x 60 mm ever cross balloon  6.  Stenting of the right external iliac artery with a 7 x 80 mm ever flex stent  7.  Completion aortoiliac angiogram with bilateral lower extremity runoff with radiographic supervision and interpretation     SURGEON: Michael Colin DO      ANESTHESIA: General.    Pertinent Test Results:   Lab Results (last 72 hours)     Procedure Component Value Units Date/Time    Tissue Pathology Exam [[id number]] Collected:  08/12/19 1433    Specimen:  Tissue from Femoral Plaque Updated:  08/13/19 0928    Basic Metabolic Panel [[id number]]  (Abnormal) Collected:  08/13/19 0600    Specimen:  Blood Updated:  08/13/19 0633     Glucose 79 mg/dL      BUN 9 mg/dL      Creatinine 0.82 mg/dL      Sodium 139 mmol/L      Potassium 4.4 mmol/L      Chloride 107 mmol/L      CO2 28.0 mmol/L      Calcium 8.0  mg/dL      eGFR Non African Amer 91 mL/min/1.73      BUN/Creatinine Ratio 11.0     Anion Gap 4.0 mmol/L     Narrative:       GFR Normal >60  Chronic Kidney Disease <60  Kidney Failure <15    CBC & Differential [[id number]] Collected:  08/13/19 0600    Specimen:  Blood Updated:  08/13/19 0623    Narrative:       The following orders were created for panel order CBC & Differential.  Procedure                               Abnormality         Status                     ---------                               -----------         ------                     CBC Auto Differential[[id number]]        Abnormal            Final result                 Please view results for these tests on the individual orders.    CBC Auto Differential [[id number]]  (Abnormal) Collected:  08/13/19 0600    Specimen:  Blood Updated:  08/13/19 0623     WBC 7.29 10*3/mm3      RBC 3.02 10*6/mm3      Hemoglobin 9.4 g/dL      Hematocrit 28.0 %      MCV 92.7 fL      MCH 31.1 pg      MCHC 33.6 g/dL      RDW 15.2 %      RDW-SD 51.8 fl      MPV 10.7 fL      Platelets 304 10*3/mm3      Neutrophil % 83.0 %      Lymphocyte % 9.1 %      Monocyte % 6.7 %      Eosinophil % 0.4 %      Basophil % 0.5 %      Immature Grans % 0.3 %      Neutrophils, Absolute 6.05 10*3/mm3      Lymphocytes, Absolute 0.66 10*3/mm3      Monocytes, Absolute 0.49 10*3/mm3      Eosinophils, Absolute 0.03 10*3/mm3      Basophils, Absolute 0.04 10*3/mm3      Immature Grans, Absolute 0.02 10*3/mm3      nRBC 0.0 /100 WBC     Basic Metabolic Panel [[id number]]  (Abnormal) Collected:  08/12/19 0458    Specimen:  Blood Updated:  08/12/19 0529     Glucose 80 mg/dL      BUN 11 mg/dL      Creatinine 0.85 mg/dL      Sodium 141 mmol/L      Potassium 4.0 mmol/L      Chloride 109 mmol/L      CO2 29.0 mmol/L      Calcium 8.4 mg/dL      eGFR Non African Amer 87 mL/min/1.73      BUN/Creatinine Ratio 12.9     Anion Gap 3.0 mmol/L     Narrative:       GFR Normal >60  Chronic Kidney Disease <60  Kidney Failure  <15    CBC & Differential [[id number]] Collected:  08/12/19 0458    Specimen:  Blood Updated:  08/12/19 0511    Narrative:       The following orders were created for panel order CBC & Differential.  Procedure                               Abnormality         Status                     ---------                               -----------         ------                     CBC Auto Differential[[id number]]        Abnormal            Final result                 Please view results for these tests on the individual orders.    CBC Auto Differential [[id number]]  (Abnormal) Collected:  08/12/19 0458    Specimen:  Blood Updated:  08/12/19 0511     WBC 4.10 10*3/mm3      RBC 3.46 10*6/mm3      Hemoglobin 10.7 g/dL      Hematocrit 32.0 %      MCV 92.5 fL      MCH 30.9 pg      MCHC 33.4 g/dL      RDW 15.2 %      RDW-SD 52.0 fl      MPV 10.7 fL      Platelets 280 10*3/mm3      Neutrophil % 64.9 %      Lymphocyte % 21.5 %      Monocyte % 8.8 %      Eosinophil % 3.4 %      Basophil % 1.2 %      Immature Grans % 0.2 %      Neutrophils, Absolute 2.66 10*3/mm3      Lymphocytes, Absolute 0.88 10*3/mm3      Monocytes, Absolute 0.36 10*3/mm3      Eosinophils, Absolute 0.14 10*3/mm3      Basophils, Absolute 0.05 10*3/mm3      Immature Grans, Absolute 0.01 10*3/mm3      nRBC 0.0 /100 WBC     POC Glucose Once [[id number]]  (Normal) Collected:  08/11/19 1313    Specimen:  Blood Updated:  08/11/19 1325     Glucose 126 mg/dL      Comment: : [id number] Vincent Chelo) MalloryMeter ID: [id number]       Iron Profile [[id number]]  (Abnormal) Collected:  08/11/19 0633    Specimen:  Blood Updated:  08/11/19 1253     Iron 31 mcg/dL      TIBC 242 mcg/dL      Iron Saturation 13 %     Hemoglobin A1c [[id number]]  (Normal) Collected:  08/11/19 0633    Specimen:  Blood Updated:  08/11/19 0809     Hemoglobin A1C 5.0 %     Narrative:       Less than 6.0           Non-Diabetic Range  6.0-7.0                 ADA Therapeutic Target  Greater than 7.0         Action Suggested    TSH [[id number]]  (Normal) Collected:  08/11/19 0633    Specimen:  Blood Updated:  08/11/19 0757     TSH 0.794 mIU/mL     Lipid Panel [[id number]]  (Abnormal) Collected:  08/11/19 0633    Specimen:  Blood Updated:  08/11/19 0737     Total Cholesterol 79 mg/dL      Triglycerides 78 mg/dL      HDL Cholesterol 33 mg/dL      LDL Cholesterol  46 mg/dL      LDL/HDL Ratio 0.92    Comprehensive Metabolic Panel [[id number]]  (Abnormal) Collected:  08/11/19 0633    Specimen:  Blood Updated:  08/11/19 0727     Glucose 77 mg/dL      BUN 10 mg/dL      Creatinine 0.82 mg/dL      Sodium 140 mmol/L      Potassium 3.9 mmol/L      Chloride 107 mmol/L      CO2 28.0 mmol/L      Calcium 8.7 mg/dL      Total Protein 5.8 g/dL      Albumin 3.00 g/dL      ALT (SGPT) 23 U/L      AST (SGOT) 18 U/L      Alkaline Phosphatase 41 U/L      Total Bilirubin 0.5 mg/dL      eGFR Non African Amer 91 mL/min/1.73      Globulin 2.8 gm/dL      A/G Ratio 1.1 g/dL      BUN/Creatinine Ratio 12.2     Anion Gap 5.0 mmol/L     Narrative:       GFR Normal >60  Chronic Kidney Disease <60  Kidney Failure <15    CBC & Differential [[id number]] Collected:  08/11/19 0633    Specimen:  Blood Updated:  08/11/19 0718    Narrative:       The following orders were created for panel order CBC & Differential.  Procedure                               Abnormality         Status                     ---------                               -----------         ------                     CBC Auto Differential[[id number]]        Abnormal            Final result                 Please view results for these tests on the individual orders.    CBC Auto Differential [[id number]]  (Abnormal) Collected:  08/11/19 0633    Specimen:  Blood Updated:  08/11/19 0718     WBC 4.12 10*3/mm3      RBC 3.63 10*6/mm3      Hemoglobin 11.1 g/dL      Hematocrit 33.4 %      MCV 92.0 fL      MCH 30.6 pg      MCHC 33.2 g/dL      RDW 15.0 %      RDW-SD 50.4 fl      MPV 11.1 fL      Platelets  283 10*3/mm3      Neutrophil % 70.2 %      Lymphocyte % 16.5 %      Monocyte % 9.0 %      Eosinophil % 2.9 %      Basophil % 1.2 %      Immature Grans % 0.2 %      Neutrophils, Absolute 2.89 10*3/mm3      Lymphocytes, Absolute 0.68 10*3/mm3      Monocytes, Absolute 0.37 10*3/mm3      Eosinophils, Absolute 0.12 10*3/mm3      Basophils, Absolute 0.05 10*3/mm3      Immature Grans, Absolute 0.01 10*3/mm3      nRBC 0.0 /100 WBC     Comprehensive Metabolic Panel [[id number]]  (Abnormal) Collected:  08/10/19 1035    Specimen:  Blood Updated:  08/10/19 1105     Glucose 113 mg/dL      BUN 10 mg/dL      Creatinine 0.87 mg/dL      Sodium 142 mmol/L      Potassium 3.8 mmol/L      Chloride 105 mmol/L      CO2 27.0 mmol/L      Calcium 8.9 mg/dL      Total Protein 6.2 g/dL      Albumin 3.40 g/dL      ALT (SGPT) 24 U/L      AST (SGOT) 20 U/L      Alkaline Phosphatase 41 U/L      Total Bilirubin 0.5 mg/dL      eGFR Non African Amer 85 mL/min/1.73      Globulin 2.8 gm/dL      A/G Ratio 1.2 g/dL      BUN/Creatinine Ratio 11.5     Anion Gap 10.0 mmol/L     Narrative:       GFR Normal >60  Chronic Kidney Disease <60  Kidney Failure <15    CBC & Differential [[id number]] Collected:  08/10/19 1035    Specimen:  Blood Updated:  08/10/19 1042    Narrative:       The following orders were created for panel order CBC & Differential.  Procedure                               Abnormality         Status                     ---------                               -----------         ------                     CBC Auto Differential[[id number]]        Abnormal            Final result                 Please view results for these tests on the individual orders.    CBC Auto Differential [[id number]]  (Abnormal) Collected:  08/10/19 1035    Specimen:  Blood Updated:  08/10/19 1042     WBC 4.23 10*3/mm3      RBC 3.82 10*6/mm3      Hemoglobin 11.6 g/dL      Hematocrit 34.7 %      MCV 90.8 fL      MCH 30.4 pg      MCHC 33.4 g/dL      RDW 15.1 %      RDW-SD  49.1 fl      MPV 10.6 fL      Platelets 318 10*3/mm3      Neutrophil % 78.8 %      Lymphocyte % 11.3 %      Monocyte % 6.9 %      Eosinophil % 1.9 %      Basophil % 0.9 %      Immature Grans % 0.2 %      Neutrophils, Absolute 3.33 10*3/mm3      Lymphocytes, Absolute 0.48 10*3/mm3      Monocytes, Absolute 0.29 10*3/mm3      Eosinophils, Absolute 0.08 10*3/mm3      Basophils, Absolute 0.04 10*3/mm3      Immature Grans, Absolute 0.01 10*3/mm3      nRBC 0.0 /100 WBC         Imaging Results (last 7 days)     Procedure Component Value Units Date/Time    FL C Arm During Surgery [[id number]] Collected:  08/12/19 1552     Updated:  08/12/19 1552    Narrative:       Performed by Dr. Colin. Please see procedure note.     This report was finalized on  by Dr. Michael Colin MD.    IR Angiogram Extremity Bilateral [[id number]] Collected:  08/12/19 1552     Updated:  08/12/19 1552    Narrative:       Performed by Dr. Colin. Please see procedure note.     This report was finalized on  by Dr. Michael Colin MD.    US Venous Doppler Lower Extremity Left (duplex) [[id number]] Collected:  08/11/19 1048     Updated:  08/11/19 1051    Narrative:       History: Pain       Impression:       Impression: There is no evidence of deep venous thrombosis or  superficial thrombophlebitis of the left lower extremity.     Comments: Left lower extremity venous duplex exam was performed using  color Doppler flow, Doppler wave form analysis, and grayscale imaging,  with and without compression. There is no evidence of deep venous  thrombosis of the common femoral, superficial femoral, popliteal,  posterior tibial, and peroneal veins. There is no thrombus identified in  the saphenofemoral junction or the greater saphenous vein. There is no  evidence of clot in the right common femoral vein.     This report was finalized on 08/11/2019 10:48 by Dr. Michael Colin MD.    US Arterial Doppler Lower Extremity Left [[id number]] Collected:  08/11/19  1043     Updated:  08/11/19 1048    Narrative:          History: Leg pain     Comments: Grayscale imaging as well as color flow duplex were used to  evaluate the left lower extremity arterial systems.     On the left, the peak systolic velocity in the common femoral artery  measured 116cm/s. In the profunda femoris artery measured 21.4cm/s. In  the proximal SFA measured 97.2cm/s. In the mid SFA measured 48.7cm/s. In  the distal SFA measured 41.6cm/s. In the popliteal artery measured  34.6cm/s. In the posterior tibial artery measured 16.5cm/s. In the  anterior tibial artery measured 24cm/s. The peak systolic velocity in  the right common femoral artery measured 103 cm/s.       Impression:       Patent left lower extremity arterial system without evidence  of significant stenosis. There is dampened waveforms bilaterally in the  common femoral arteries. This could indicate significant bilateral  aortoiliac inflow disease.  This report was finalized on 08/11/2019 10:45 by Dr. Michael Colin MD.    XR Hip With or Without Pelvis 2 - 3 View Left [[id number]] Collected:  08/10/19 0735     Updated:  08/10/19 0739    Narrative:       XR HIP W OR WO PELVIS 2-3 VIEW LEFT- 8/10/2019 3:00 AM CDT     HISTORY: LEFT hip pain     COMPARISON: CT 08/10/2019      Findings:   Frontal and lateral views of the left hip were obtained. Frontal view of  the pelvis was also obtained.      There is no fracture or dislocation. A LEFT hip arthroplasty is seen.  The sacroiliac joints are patent. A LEFT iliac artery stent is present.  Diffuse atherosclerosis is noted. A LEFT lower quadrant ostomy is  present.        Impression:       1. No evidence of acute bony abnormality in the pelvis or LEFT hip.        This report was finalized on 08/10/2019 07:36 by Dr. Waldemar Love MD.    CT Angiogram Abdomen Pelvis With & Without Contrast [[id number]] Collected:  08/10/19 0704     Updated:  08/10/19 0716    Narrative:       CT ANGIOGRAM ABDOMEN PELVIS W  WO CONTRAST- 8/10/2019 3:57 AM CDT     HISTORY: rule out arterial occlusion (abnormal Ultrasound of leg)     COMPARISON: None     DOSE LENGTH PRODUCT: 694 mGy cm. Automated exposure control was also  utilized to decrease patient radiation dose.     TECHNIQUE: Helical tomographic images of the abdomen and pelvis  utilizing angiographic protocol were obtained following the intravenous  infusion of contrast. Multiplanar and 3 D reformatted images were  provided for review.     FINDINGS:     Angiogram:  There is marked atherosclerosis in the aorta without evidence of  aneurysm. Atherosclerotic calcifications are also noted in the celiac  artery and its branches and the SMA. There is no significant stenosis of  the structures.     Atherosclerosis is seen in the renal artery origins, but there is no  significant stenosis or aneurysm.     There is complete occlusion of the LEFT common iliac, external iliac,  and common femoral artery. Reconstitution of flow is seen at the LEFT  femoral bifurcation. An iliac artery stent is seen.     There moderate to marked atherosclerosis is noted in the RIGHT iliac and  femoral arteries, as well, but there is no occlusion. Moderate to marked  atherosclerotic stenosis of the RIGHT external iliac artery is noted.  There is also critical stenosis of the RIGHT common femoral artery on  axial image 169.     The internal iliac arteries are occluded.     Other findings:  Emphysematous changes are seen in the lower lungs. A small gallstone is  noted. No acute process is seen in the liver, pancreas, kidneys, adrenal  glands, spleen, or GI tract. A moderate to large hydrocele is noted.  There is a fluid collection in the region of the urethra, possibly due  to a large diverticulum. There is an age-indeterminate compression  fracture in the L2 vertebral body. The bones are osteopenic. A LEFT  lower quadrant colostomy is noted.       Impression:       1. Occluded LEFT common iliac, external iliac,  and LEFT common femoral  arteries. No aneurysm.  2. High-grade stenoses of the proximal RIGHT external iliac artery and  the RIGHT common femoral artery.  3. Cholelithiasis.  4. Age-indeterminate L2 vertebral body fracture with approximately 30%  vertebral body height loss.  5. 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     A preliminary [facility]. I  agree with the preliminary interpretation.     This report was finalized on 08/10/2019 07:12 by Dr. Waldemar Love MD.        History of Present Illness on Day of Discharge:   The patient is currently sitting up in bed.  He denies any leg or foot pain this morning.  No bleeding noted.  Dr. Colin is okay with discharge today.    Hospital Course:  The patient is a 79 y.o. male who presented to  with leg pain.  Patient has a past medical history significant for vascular disease, hyperlipidemia, hypertension, prostate cancer and colon cancer.  The patient stated on admission that he had been having leg pain and his ambulation was affected.  He usually is able to walk with a cane or walker but at this point time his pain in his leg is so significant he is having issues ambulating at all.  The patient does have a history of iliac artery stents.  He was admitted for further evaluation and treatment of right and left iliac artery occlusions.  Vascular surgery was consulted and felt that revascularization was needed.  He was started on therapeutic Lovenox until revascularization could be accomplished.  He was monitored over the weekend and had remained stable.  Yesterday he went for revascularization and tolerated the procedure well.  Today postoperatively he is doing well with no bleeding noted.  He is no longer having leg pain.  He will be discharged home today in stable condition.  Restrictions per vascular surgery recommendations.  He will follow-up with Dr. Colin in 2  "weeks.    Condition on Discharge: Stable    Physical Exam on Discharge:  BP 91/46 (BP Location: Left arm, Patient Position: Lying)   Pulse 62   Temp 98.3 °F (36.8 °C) (Oral)   Resp 16   Ht 188 cm (74\")   Wt 74.4 kg (164 lb 1.6 oz)   SpO2 96%   BMI 21.07 kg/m²   Physical Exam   Constitutional: He is oriented to person, place, and time. He appears well-developed and well-nourished.   Sitting up in bed.  No acute distress.   HENT:   Head: Normocephalic and atraumatic.   Eyes: Conjunctivae and EOM are normal. Pupils are equal, round, and reactive to light.   Neck: Neck supple. No JVD present. No thyromegaly present.   Cardiovascular: Normal rate, regular rhythm, normal heart sounds and intact distal pulses. Exam reveals no gallop and no friction rub.   No murmur heard.  Pulmonary/Chest: Effort normal and breath sounds normal. No respiratory distress. He has no wheezes. He has no rales. He exhibits no tenderness.   Abdominal: Soft. Bowel sounds are normal. He exhibits no distension. There is no tenderness. There is no rebound and no guarding.   Musculoskeletal: Normal range of motion. He exhibits no edema, tenderness or deformity.   Lymphadenopathy:     He has no cervical adenopathy.   Neurological: He is alert and oriented to person, place, and time.   Skin: Skin is warm and dry. No rash noted.   Psychiatric: He has a normal mood and affect. His behavior is normal. Judgment and thought content normal.   Nursing note and vitals reviewed.    Discharge Disposition:  Home or Self Care    Discharge Medications:     Discharge Medications      New Medications      Instructions Start Date   clopidogrel 75 MG tablet  Commonly known as:  PLAVIX   75 mg, Oral, Daily      ferrous sulfate 324 (65 Fe) MG tablet delayed-release EC tablet   324 mg, Oral, Daily With Breakfast         Continue These Medications      Instructions Start Date   aspirin 81 MG tablet   81 mg, Oral, Daily      atorvastatin 10 MG tablet  Commonly known " as:  LIPITOR   10 mg, Oral, Nightly      donepezil 10 MG tablet  Commonly known as:  ARICEPT   10 mg, Oral, Nightly      lisinopril 20 MG tablet  Commonly known as:  PRINIVIL,ZESTRIL   20 mg, Oral, Daily      LOPRESSOR 50 MG tablet  Generic drug:  metoprolol tartrate   50 mg, Oral, Daily      Melatonin 10 MG capsule   1 capsule, Oral, Nightly      memantine 10 MG tablet  Commonly known as:  NAMENDA   10 mg, Oral, 2 Times Daily      PROBIOTIC-10 capsule   1 capsule, Oral, Daily      trospium 20 MG tablet  Commonly known as:  SANCTURA   20 mg, Oral, 2 Times Daily      VITAMIN B-12 PO   1 tablet, Oral, Daily      vitamin D3 5000 units capsule capsule   5,000 Units, Oral, Daily           Discharge Diet:   Diet Instructions     Diet: Regular      Discharge Diet:  Regular        Activity at Discharge:   Activity Instructions     Driving Restrictions      Type of Restriction:  Driving    Driving Restrictions:  No Driving (Time Limited)    Length:  1 Week    Lifting Restrictions      Type of Restriction:  Lifting    Lifting Restrictions:  Lifting Restriction (Indicate Limit)    Weight Limit (Pounds):  10    Length of Lifting Restriction:  1 week    Other Activity Restrictions      Type of Restriction:  Other    Explain Other Restrictions:  No bending, stooping, squatting        Discharge Care Plan/Instructions:   1.  Follow-up with Dr. Colin in 2 weeks.    Follow-up Appointments:   Future Appointments   Date Time Provider Department Center   10/18/2019 10:15 AM Ben Mae MD [facility]     Test Results Pending at Discharge: None    [facility]  08/13/19  8:03 AM    Time: 35 minutes

## 2019-08-13 NOTE — PROGRESS NOTES
Case Management Discharge Note    Final Note: Home with family to assist.  Denies need for home health at this time. DME in home.     Destination      No service has been selected for the patient.      Durable Medical Equipment      No service has been selected for the patient.      Dialysis/Infusion      No service has been selected for the patient.      Home Medical Care      No service has been selected for the patient.      Therapy      No service has been selected for the patient.      Community Resources      No service has been selected for the patient.             Final Discharge Disposition Code: 01 - home or self-care

## 2019-08-13 NOTE — PROGRESS NOTES
Discharge Planning Assessment  [facility]     Patient Name: Antony Duenas  MRN: [id number]  Today's Date: 8/13/2019    Admit Date: 8/10/2019    Discharge Needs Assessment     Row Name 08/13/19 1029       Living Environment    Lives With  significant other;child(aktiuska), adult    Name(s) of Who Lives With Patient  Ragini, spouse    Current Living Arrangements  home/apartment/condo    Primary Care Provided by  self;spouse/significant other    Provides Primary Care For  no one, unable/limited ability to care for self    Family Caregiver if Needed  none;child(katiuska), adult    Family Caregiver Names  Ragini, spouse;  Luana, daughter    Quality of Family Relationships  helpful;involved;supportive    Able to Return to Prior Arrangements  yes       Resource/Environmental Concerns    Resource/Environmental Concerns  none    Transportation Concerns  car, none       Transition Planning    Patient/Family Anticipates Transition to  home with family    Patient/Family Anticipated Services at Transition  none    Transportation Anticipated  family or friend will provide       Discharge Needs Assessment    Readmission Within the Last 30 Days  no previous admission in last 30 days    Concerns to be Addressed  no discharge needs identified    Equipment Currently Used at Home  cane, straight;walker, standard    Anticipated Changes Related to Illness  none    Equipment Needed After Discharge  none    Discharge Coordination/Progress  Patient and family plan on home today. Denies need for home health. Has used [facility]  in past. Will notifiy PCP if Home health becomes a need.  No PT/OT involvement this admit. no notes to review. Up in room with assist x1.  DME in home already. PCP and Rx coverage in place.         Discharge Plan    No documentation.       Destination      No service coordination in this encounter.      Durable Medical Equipment      No service coordination in this encounter.      Dialysis/Infusion      No service coordination  in this encounter.      Home Medical Care      No service coordination in this encounter.      Therapy      No service coordination in this encounter.      Community Resources      No service coordination in this encounter.        Expected Discharge Date and Time     Expected Discharge Date Expected Discharge Time    Aug 13, 2019         Demographic Summary    No documentation.       Functional Status    No documentation.       Psychosocial    No documentation.       Abuse/Neglect    No documentation.       Legal    No documentation.       Substance Abuse    No documentation.       Patient Forms    No documentation.           Mary Kiran RN

## 2019-08-13 NOTE — ANESTHESIA POSTPROCEDURE EVALUATION
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])    Surgeon:  Michael Colin DO Provider:  Joycelyn Kennedy CRNA    Anesthesia Type:  general ASA Status:  3          Anesthesia Type: general  Last vitals  BP   105/49(nurse notified) (08/13/19 0336)   Temp   97.9 °F (36.6 °C) (08/13/19 0336)   Pulse   68 (08/13/19 0336)   Resp   16 (08/13/19 0336)     SpO2   94 % (08/13/19 0336)     Post Anesthesia Care and Evaluation    Patient location during evaluation: PACU  Patient participation: complete - patient participated  Level of consciousness: awake and alert  Pain management: adequate  Airway patency: patent  Anesthetic complications: No anesthetic complications    Cardiovascular status: acceptable  Respiratory status: acceptable  Hydration status: acceptable    Comments: Blood pressure 105/49, pulse 68, temperature 97.9 °F (36.6 °C), temperature source Oral, resp. rate 16, height 188 cm (74\"), weight 74.4 kg (164 lb 1.6 oz), SpO2 94 %.    Pt discharged from PACU based on antonio score >8      "

## 2019-08-13 NOTE — PLAN OF CARE
Problem: Patient Care Overview  Goal: Plan of Care Review  Outcome: Ongoing (interventions implemented as appropriate)   08/13/19 0646   Coping/Psychosocial   Plan of Care Reviewed With patient;spouse   Plan of Care Review   Progress no change   OTHER   Outcome Summary Pt. denies pain so far this shift; left groin dressing with small amt of drainage, slightly firm in center, soft around perimeter; Pulses doppler +3 left and right; F/C to BSD; IVF infusing; will monitor.     Goal: Individualization and Mutuality  Outcome: Ongoing (interventions implemented as appropriate)    Goal: Discharge Needs Assessment  Outcome: Ongoing (interventions implemented as appropriate)    Goal: Interprofessional Rounds/Family Conf  Outcome: Ongoing (interventions implemented as appropriate)      Problem: VTE, DVT and PE (Adult)  Goal: Signs and Symptoms of Listed Potential Problems Will be Absent, Minimized or Managed (VTE, DVT and PE)  Outcome: Ongoing (interventions implemented as appropriate)      Problem: Pain, Acute (Adult)  Goal: Identify Related Risk Factors and Signs and Symptoms  Outcome: Ongoing (interventions implemented as appropriate)    Goal: Acceptable Pain Control/Comfort Level  Outcome: Ongoing (interventions implemented as appropriate)

## 2019-08-13 NOTE — NURSING NOTE
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 male, being seen by [facility] for ostomy care and education.      Patient's wife is at bedside.  Completed pouch changed and education.  Plans for pt dc today.       19 0916   Colostomy LLQ   Placement Date/Time: (c) 07 0000   Placed by External Staff?: Other hospital  Colostomy Type: Descending/sigmoid;End  Location: LLQ   Stomal Appliance Changed;2 piece;Drainable;Other (Comment)  (Convex)   Stoma Appearance flush with skin;moist;red   Peristomal Assessment Clean;Intact   Accessories/Skin Care cleansed with water;wafer barrier over peristomal skin   Stoma Function flatus;stool   Stool Color brown, light   Stool Consistency soft       Recommendations:  Please see wound-ostomy order set for recommended orders.      )Chelsie Olivarez RN 2019

## 2019-08-14 ENCOUNTER — TELEPHONE (OUTPATIENT)
Dept: FAMILY MEDICINE CLINIC | Facility: CLINIC | Age: 79
End: 2019-08-14

## 2019-08-14 ENCOUNTER — READMISSION MANAGEMENT (OUTPATIENT)
Dept: CALL CENTER | Facility: HOSPITAL | Age: 79
End: 2019-08-14

## 2019-08-14 NOTE — TELEPHONE ENCOUNTER
WIFE HAS CALLED FOR F/U APPT-PT HAD SURGERY-ADVISED TO F/U 1-2 WEEKS.      ADMIT:  08.09.19 AND DISCHARGED 08.13.19. SURGERY WAS ON Monday.      F/U WITH SURGEON ON 08.28.19.      APPT ON 08.21.19 @ 1230PM

## 2019-08-15 ENCOUNTER — READMISSION MANAGEMENT (OUTPATIENT)
Dept: CALL CENTER | Facility: HOSPITAL | Age: 79
End: 2019-08-15

## 2019-08-15 NOTE — OUTREACH NOTE
General Surgery Week 1 Survey      Responses   Facility patient discharged from?  [facility]   Does the patient have one of the following disease processes/diagnoses(primary or secondary)?  General Surgery   Is there a successful TCM telephone encounter documented?  No   Week 1 attempt successful?  No   Unsuccessful attempts  Attempt 1          Shola Irizarry, RN

## 2019-08-15 NOTE — OUTREACH NOTE
Prep Survey      Responses   Facility patient discharged from?  [place]   Is patient eligible?  Yes   Discharge diagnosis  Iliac artery occlusion,   Left common femoral artery endarterectomy,    Thrombectomy of the left common iliac artery and external iliac artery     Does the patient have one of the following disease processes/diagnoses(primary or secondary)?  General Surgery   Does the patient have Home health ordered?  No   Is there a DME ordered?  No   Prep survey completed?  Yes          Amie Hurt RN

## 2019-08-16 ENCOUNTER — READMISSION MANAGEMENT (OUTPATIENT)
Dept: CALL CENTER | Facility: HOSPITAL | Age: 79
End: 2019-08-16

## 2019-08-16 ENCOUNTER — TRANSITIONAL CARE MANAGEMENT TELEPHONE ENCOUNTER (OUTPATIENT)
Dept: FAMILY MEDICINE CLINIC | Facility: CLINIC | Age: 79
End: 2019-08-16

## 2019-08-16 NOTE — OUTREACH NOTE
General Surgery Week 1 Survey      Responses   Facility patient discharged from?  [facility]   Does the patient have one of the following disease processes/diagnoses(primary or secondary)?  General Surgery   Is there a successful TCM telephone encounter documented?  No   Week 1 attempt successful?  Yes   Call start time  1223   Call end time  1226   Discharge diagnosis  Iliac artery occlusion,   Left common femoral artery endarterectomy,    Thrombectomy of the left common iliac artery and external iliac artery     Is patient permission given to speak with other caregiver?  Yes   List who call center can speak with  spouse- Ragini   Person spoke with today (if not patient) and relationship  spouse- Ragini   Meds reviewed with patient/caregiver?  Yes   Is the patient having any side effects they believe may be caused by any medication additions or changes?  No   Does the patient have all medications related to this admission filled (includes all antibiotics, pain medications, etc.)  Yes   Is the patient taking all medications as directed (includes completed medication regime)?  Yes   Does the patient have a follow up appointment scheduled with their surgeon?  Yes   Has the patient kept scheduled appointments due by today?  N/A   Comments  has Post-op appt with Aug. 28th   Has home health visited the patient within 72 hours of discharge?  N/A   Psychosocial issues?  No   Did the patient receive a copy of their discharge instructions?  Yes   Nursing interventions  Reviewed instructions with patient   What is the patient's perception of their health status since discharge?  Improving   Nursing interventions  Nurse provided patient education   Is the patient /caregiver able to teach back basic post-op care?  Continue use of incentive spirometry at least 1 week post discharge, Practice 'cough and deep breath', Drive as instructed by MD in discharge instructions, Take showers only when approved by MD-sponge bathe until then, No  tub bath, swimming, or hot tub until instructed by MD, Do not remove steri-strips, Lifting as instructed by MD in discharge instructions, Keep incision areas clean,dry and protected   Is the patient/caregiver able to teach back signs and symptoms of incisional infection?  Increased redness, swelling or pain at the incisonal site, Increased drainage or bleeding, Incisional warmth, Pus or odor from incision, Fever   Is the patient/caregiver able to teach back the hierarchy of who to call/visit for symptoms/problems? PCP, Specialist, Home health nurse, Urgent Care, ED, 911  Yes   Additional teach back comments  Per spouse, pt is doing really well, slowly getting better each day, encouraged them to follow all discharge instructions closely, no questions or concerns at this time.   Week 1 call completed?  Yes          Elizabeth Duenas RN

## 2019-08-19 LAB
CYTO UR: NORMAL
LAB AP CASE REPORT: NORMAL
PATH REPORT.FINAL DX SPEC: NORMAL
PATH REPORT.GROSS SPEC: NORMAL

## 2019-08-20 RX ORDER — LISINOPRIL 20 MG/1
20 TABLET ORAL DAILY
Qty: 90 TABLET | Refills: 2 | Status: SHIPPED | OUTPATIENT
Start: 2019-08-20

## 2019-08-21 ENCOUNTER — OFFICE VISIT (OUTPATIENT)
Dept: FAMILY MEDICINE CLINIC | Facility: CLINIC | Age: 79
End: 2019-08-21

## 2019-08-21 VITALS
WEIGHT: 176 LBS | DIASTOLIC BLOOD PRESSURE: 63 MMHG | TEMPERATURE: 96.9 F | BODY MASS INDEX: 22.59 KG/M2 | SYSTOLIC BLOOD PRESSURE: 116 MMHG | HEART RATE: 54 BPM | OXYGEN SATURATION: 97 % | HEIGHT: 74 IN

## 2019-08-21 DIAGNOSIS — R10.30 LOWER ABDOMINAL PAIN, UNSPECIFIED: ICD-10-CM

## 2019-08-21 DIAGNOSIS — D50.9 IRON DEFICIENCY ANEMIA, UNSPECIFIED IRON DEFICIENCY ANEMIA TYPE: Primary | ICD-10-CM

## 2019-08-21 DIAGNOSIS — R53.83 FATIGUE, UNSPECIFIED TYPE: ICD-10-CM

## 2019-08-21 DIAGNOSIS — I74.5 ILIAC ARTERY OCCLUSION (HCC): ICD-10-CM

## 2019-08-21 LAB
CLARITY, POC: CLEAR
COLOR UR: YELLOW
GLUCOSE UR STRIP-MCNC: NEGATIVE MG/DL
KETONES UR QL: NEGATIVE
LEUKOCYTE EST, POC: ABNORMAL
NITRITE UR-MCNC: POSITIVE MG/ML
PH UR: 6.5 [PH] (ref 5–8)
PROT UR STRIP-MCNC: NEGATIVE MG/DL
RBC # UR STRIP: NEGATIVE /UL
SP GR UR: 1.02 (ref 1–1.03)
UROBILINOGEN UR QL: NORMAL

## 2019-08-21 PROCEDURE — 96372 THER/PROPH/DIAG INJ SC/IM: CPT | Performed by: FAMILY MEDICINE

## 2019-08-21 PROCEDURE — 81003 URINALYSIS AUTO W/O SCOPE: CPT | Performed by: FAMILY MEDICINE

## 2019-08-21 PROCEDURE — 99214 OFFICE O/P EST MOD 30 MIN: CPT | Performed by: FAMILY MEDICINE

## 2019-08-21 RX ADMIN — CYANOCOBALAMIN 1000 MCG: 1000 INJECTION, SOLUTION INTRAMUSCULAR; SUBCUTANEOUS at 13:02

## 2019-08-21 NOTE — PROGRESS NOTES
Administered 1,000 mcg of Cyanocobalamin into right deltoid. Patient tolerated and observed no reactions.     Exp 12/20

## 2019-08-21 NOTE — PROGRESS NOTES
Subjective   Antony Duenas is a 79 y.o. male.     79-year-old male with dementia fatigue recent peripheral vascular procedure for acute iliac occlusion      Fatigue   This is a chronic problem. The current episode started more than 1 year ago. The problem occurs daily. The problem has been waxing and waning. Associated symptoms include fatigue. Pertinent negatives include no chest pain. Exacerbated by: All of his comorbidities. Treatments tried: See chart. The treatment provided moderate relief.       The following portions of the patient's history were reviewed and updated as appropriate: allergies, current medications, past family history, past medical history, past social history, past surgical history and problem list.    Review of Systems   Constitutional: Positive for fatigue.   Cardiovascular: Positive for leg swelling. Negative for chest pain.   Genitourinary: Negative for difficulty urinating.   Neurological:        Significant sundowner       Objective   Physical Exam   Cardiovascular: Normal rate and regular rhythm.   Pulmonary/Chest: Effort normal and breath sounds normal.   Musculoskeletal: He exhibits edema.   Neurological: He is alert.   Psychiatric:   Obvious dementia but manageable   Nursing note and vitals reviewed.      Assessment/Plan   Patient Active Problem List   Diagnosis   • Cholelithiasis   • Prostate cancer (CMS/HCC)   • Iliac artery occlusion (CMS/HCC)   • Hyperlipidemia   • History of colon cancer   • Anemia   • Hypertension   • Arthritis   • PVD (peripheral vascular disease) (CMS/HCC)     Antony was seen today for follow-up.    Diagnoses and all orders for this visit:    Iron deficiency anemia, unspecified iron deficiency anemia type  -     CBC & Differential; Future  -     Basic Metabolic Panel; Future  -     Iron Profile; Future    Iliac artery occlusion (CMS/HCC)    Fatigue, unspecified type       Return in about 4 weeks (around 9/18/2019).    Plan continue ferrous sulfate get  support hose return 1 month for lab may try Tylenol PM try to lengthen his days and shortness nights having trouble sleeping at night

## 2019-08-23 ENCOUNTER — READMISSION MANAGEMENT (OUTPATIENT)
Dept: CALL CENTER | Facility: HOSPITAL | Age: 79
End: 2019-08-23

## 2019-08-23 NOTE — OUTREACH NOTE
General Surgery Week 2 Survey      Responses   Facility patient discharged from?  [place]   Does the patient have one of the following disease processes/diagnoses(primary or secondary)?  General Surgery   Week 2 attempt successful?  No   Unsuccessful attempts  Attempt 1          Chely Bond RN

## 2019-08-24 ENCOUNTER — READMISSION MANAGEMENT (OUTPATIENT)
Dept: CALL CENTER | Facility: HOSPITAL | Age: 79
End: 2019-08-24

## 2019-08-24 NOTE — OUTREACH NOTE
General Surgery Week 2 Survey      Responses   Facility patient discharged from?  [place]   Does the patient have one of the following disease processes/diagnoses(primary or secondary)?  General Surgery   Week 2 attempt successful?  No   Unsuccessful attempts  Attempt 2          Kya Solano RN

## 2019-08-26 ENCOUNTER — RESULTS ENCOUNTER (OUTPATIENT)
Dept: FAMILY MEDICINE CLINIC | Facility: CLINIC | Age: 79
End: 2019-08-26

## 2019-08-26 DIAGNOSIS — D50.9 IRON DEFICIENCY ANEMIA, UNSPECIFIED IRON DEFICIENCY ANEMIA TYPE: ICD-10-CM

## 2019-08-27 ENCOUNTER — READMISSION MANAGEMENT (OUTPATIENT)
Dept: CALL CENTER | Facility: HOSPITAL | Age: 79
End: 2019-08-27

## 2019-08-27 NOTE — OUTREACH NOTE
General Surgery Week 3 Survey      Responses   Facility patient discharged from?  [facility]   Does the patient have one of the following disease processes/diagnoses(primary or secondary)?  General Surgery   Week 3 attempt successful?  Yes   Call start time  1554   Call end time  1556   Discharge diagnosis  Iliac artery occlusion,   Left common femoral artery endarterectomy,    Thrombectomy of the left common iliac artery and external iliac artery     Is patient permission given to speak with other caregiver?  Yes   List who call center can speak with  spouse- Ragini   Person spoke with today (if not patient) and relationship  spouse- Ragini   Meds reviewed with patient/caregiver?  Yes   Is the patient having any side effects they believe may be caused by any medication additions or changes?  No   Does the patient have all medications related to this admission filled (includes all antibiotics, pain medications, etc.)  Yes   Is the patient taking all medications as directed (includes completed medication regime)?  Yes   Does the patient have a follow up appointment scheduled with their surgeon?  Yes   Has the patient kept scheduled appointments due by today?  N/A   Has home health visited the patient within 72 hours of discharge?  N/A   Psychosocial issues?  No   Did the patient receive a copy of their discharge instructions?  Yes   Nursing interventions  Reviewed instructions with patient   What is the patient's perception of their health status since discharge?  Improving   Nursing interventions  Nurse provided patient education   Is the patient /caregiver able to teach back basic post-op care?  Continue use of incentive spirometry at least 1 week post discharge, Practice 'cough and deep breath', Drive as instructed by MD in discharge instructions, Take showers only when approved by MD-sponge bathe until then, No tub bath, swimming, or hot tub until instructed by MD, Do not remove steri-strips, Lifting as instructed by  MD in discharge instructions, Keep incision areas clean,dry and protected   Is the patient/caregiver able to teach back signs and symptoms of incisional infection?  Increased redness, swelling or pain at the incisonal site, Increased drainage or bleeding, Incisional warmth, Pus or odor from incision, Fever   Is the patient/caregiver able to teach back steps to recovery at home?  Set small, achievable goals for return to baseline health, Rest and rebuild strength, gradually increase activity, Make a list of questions for surgeon's appointment   Is the patient/caregiver able to teach back the hierarchy of who to call/visit for symptoms/problems? PCP, Specialist, Home health nurse, Urgent Care, ED, 911  Yes   Additional teach back comments  Per spouse, pt is doing really well, slowly getting better each day, encouraged them to follow all discharge instructions closely, no questions or concerns at this time.   Week 3 call completed?  Yes          Aleks Murillo RN

## 2019-08-28 ENCOUNTER — OFFICE VISIT (OUTPATIENT)
Dept: VASCULAR SURGERY | Facility: CLINIC | Age: 79
End: 2019-08-28

## 2019-08-28 VITALS
BODY MASS INDEX: 22.25 KG/M2 | WEIGHT: 173.4 LBS | OXYGEN SATURATION: 99 % | HEIGHT: 74 IN | SYSTOLIC BLOOD PRESSURE: 122 MMHG | DIASTOLIC BLOOD PRESSURE: 80 MMHG | HEART RATE: 54 BPM

## 2019-08-28 DIAGNOSIS — E78.5 HYPERLIPIDEMIA, UNSPECIFIED HYPERLIPIDEMIA TYPE: ICD-10-CM

## 2019-08-28 DIAGNOSIS — I74.09 AORTOILIAC OCCLUSIVE DISEASE (HCC): ICD-10-CM

## 2019-08-28 DIAGNOSIS — I10 ESSENTIAL HYPERTENSION: ICD-10-CM

## 2019-08-28 DIAGNOSIS — I73.9 PAD (PERIPHERAL ARTERY DISEASE) (HCC): Primary | ICD-10-CM

## 2019-08-28 DIAGNOSIS — I65.23 BILATERAL CAROTID ARTERY STENOSIS: ICD-10-CM

## 2019-08-28 PROCEDURE — 99024 POSTOP FOLLOW-UP VISIT: CPT | Performed by: NURSE PRACTITIONER

## 2019-09-04 ENCOUNTER — READMISSION MANAGEMENT (OUTPATIENT)
Dept: CALL CENTER | Facility: HOSPITAL | Age: 79
End: 2019-09-04

## 2019-09-04 NOTE — OUTREACH NOTE
General Surgery Week 4 Survey      Responses   Facility patient discharged from?  [place]   Does the patient have one of the following disease processes/diagnoses(primary or secondary)?  General Surgery   Week 4 attempt successful?  Yes   Call start time  1157   Call end time  1201   Discharge diagnosis  Iliac artery occlusion,   Left common femoral artery endarterectomy,    Thrombectomy of the left common iliac artery and external iliac artery     Person spoke with today (if not patient) and relationship  spouse- Ragini   Is the patient taking all medications as directed (includes completed medication regime)?  Yes   Has the patient kept scheduled appointments due by today?  Yes   Is the patient still receiving Home Health Services?  N/A   Comments  Left leg with swelling, wearing compression hose and elevation limit salt as recommended. Swelling has improved.   What is the patient's perception of their health status since discharge?  Improving   Week 4 call completed?  Yes   Would the patient like one additional call?  No   Graduated  Yes   Did the patient feel the follow up calls were helpful during their recovery period?  Yes   Was the number of calls appropriate?  Yes          Jayla Sanchez RN

## 2019-09-20 ENCOUNTER — OFFICE VISIT (OUTPATIENT)
Dept: FAMILY MEDICINE CLINIC | Facility: CLINIC | Age: 79
End: 2019-09-20

## 2019-09-20 VITALS
WEIGHT: 174.6 LBS | TEMPERATURE: 96.5 F | SYSTOLIC BLOOD PRESSURE: 129 MMHG | BODY MASS INDEX: 22.41 KG/M2 | HEART RATE: 44 BPM | DIASTOLIC BLOOD PRESSURE: 75 MMHG | OXYGEN SATURATION: 97 % | HEIGHT: 74 IN

## 2019-09-20 DIAGNOSIS — R53.83 FATIGUE, UNSPECIFIED TYPE: ICD-10-CM

## 2019-09-20 DIAGNOSIS — Z23 NEED FOR IMMUNIZATION AGAINST INFLUENZA: Primary | ICD-10-CM

## 2019-09-20 PROCEDURE — G0008 ADMIN INFLUENZA VIRUS VAC: HCPCS | Performed by: FAMILY MEDICINE

## 2019-09-20 PROCEDURE — 99213 OFFICE O/P EST LOW 20 MIN: CPT | Performed by: FAMILY MEDICINE

## 2019-09-20 PROCEDURE — 90653 IIV ADJUVANT VACCINE IM: CPT | Performed by: FAMILY MEDICINE

## 2019-09-20 RX ORDER — INDAPAMIDE 2.5 MG/1
2.5 TABLET, FILM COATED ORAL EVERY MORNING
Qty: 90 TABLET | Refills: 1 | Status: SHIPPED | OUTPATIENT
Start: 2019-09-20 | End: 2020-01-01

## 2019-09-20 NOTE — PROGRESS NOTES
Subjective   Antony Duenas is a 79 y.o. male.     79-year-old male with dementia anemia hypertension hyperlipidemia and fatigue      Fatigue   This is a recurrent problem. The current episode started more than 1 month ago. The problem occurs daily. The problem has been gradually improving. Associated symptoms include fatigue. Pertinent negatives include no chest pain. Exacerbated by: Recent surgery for peripheral vascular disease postop anemia. Treatments tried: Iron supplement. The treatment provided moderate relief.       The following portions of the patient's history were reviewed and updated as appropriate: allergies, current medications, past family history, past medical history, past social history, past surgical history and problem list.    Review of Systems   Constitutional: Positive for fatigue.   Cardiovascular: Positive for leg swelling. Negative for chest pain.        For vascular disease   Hematological:        Postop anemia       Objective   Physical Exam   Constitutional: He appears well-developed and well-nourished.   Cardiovascular: Normal rate and regular rhythm.   Pulmonary/Chest: Effort normal and breath sounds normal.   Musculoskeletal: He exhibits edema.   Neurological: He is alert.   Psychiatric: He has a normal mood and affect.   Thought process off   Nursing note and vitals reviewed.      Assessment/Plan   Patient Active Problem List   Diagnosis   • Cholelithiasis   • Prostate cancer (CMS/HCC)   • Iliac artery occlusion (CMS/HCC)   • Hyperlipidemia   • History of colon cancer   • Anemia   • Hypertension   • Arthritis   • PVD (peripheral vascular disease) (CMS/HCC)     Antony was seen today for follow-up.    Diagnoses and all orders for this visit:    Need for immunization against influenza  -     Fluad Quad >65 years    Fatigue, unspecified type    Other orders  -     indapamide (LOZOL) 2.5 MG tablet; Take 1 tablet by mouth Every Morning.       Return in about 3 months (around 12/20/2019),  or if symptoms worsen or fail to improve.       Plan above      Electronically signed by Ben Mae MD 09/20/2019

## 2019-09-21 LAB
BASOPHILS # BLD AUTO: 0.08 10*3/MM3 (ref 0–0.2)
BASOPHILS NFR BLD AUTO: 1.5 % (ref 0–1.5)
BUN SERPL-MCNC: 14 MG/DL (ref 8–23)
BUN/CREAT SERPL: 13.5 (ref 7–25)
CALCIUM SERPL-MCNC: 9.1 MG/DL (ref 8.6–10.5)
CHLORIDE SERPL-SCNC: 103 MMOL/L (ref 98–107)
CO2 SERPL-SCNC: 26.2 MMOL/L (ref 22–29)
CREAT SERPL-MCNC: 1.04 MG/DL (ref 0.76–1.27)
EOSINOPHIL # BLD AUTO: 0.16 10*3/MM3 (ref 0–0.4)
EOSINOPHIL NFR BLD AUTO: 3.1 % (ref 0.3–6.2)
ERYTHROCYTE [DISTWIDTH] IN BLOOD BY AUTOMATED COUNT: 13.5 % (ref 12.3–15.4)
GLUCOSE SERPL-MCNC: 74 MG/DL (ref 65–99)
HCT VFR BLD AUTO: 39 % (ref 37.5–51)
HGB BLD-MCNC: 12.6 G/DL (ref 13–17.7)
IMM GRANULOCYTES # BLD AUTO: 0.01 10*3/MM3 (ref 0–0.05)
IMM GRANULOCYTES NFR BLD AUTO: 0.2 % (ref 0–0.5)
IRON SATN MFR SERPL: 19 % (ref 20–50)
IRON SERPL-MCNC: 58 MCG/DL (ref 59–158)
LYMPHOCYTES # BLD AUTO: 0.72 10*3/MM3 (ref 0.7–3.1)
LYMPHOCYTES NFR BLD AUTO: 13.9 % (ref 19.6–45.3)
MCH RBC QN AUTO: 30.6 PG (ref 26.6–33)
MCHC RBC AUTO-ENTMCNC: 32.3 G/DL (ref 31.5–35.7)
MCV RBC AUTO: 94.7 FL (ref 79–97)
MONOCYTES # BLD AUTO: 0.38 10*3/MM3 (ref 0.1–0.9)
MONOCYTES NFR BLD AUTO: 7.3 % (ref 5–12)
NEUTROPHILS # BLD AUTO: 3.84 10*3/MM3 (ref 1.7–7)
NEUTROPHILS NFR BLD AUTO: 74 % (ref 42.7–76)
NRBC BLD AUTO-RTO: 0.2 /100 WBC (ref 0–0.2)
PLATELET # BLD AUTO: 404 10*3/MM3 (ref 140–450)
POTASSIUM SERPL-SCNC: 5 MMOL/L (ref 3.5–5.2)
RBC # BLD AUTO: 4.12 10*6/MM3 (ref 4.14–5.8)
SODIUM SERPL-SCNC: 142 MMOL/L (ref 136–145)
TIBC SERPL-MCNC: 311 MCG/DL
UIBC SERPL-MCNC: 253 MCG/DL (ref 112–346)
WBC # BLD AUTO: 5.19 10*3/MM3 (ref 3.4–10.8)

## 2019-09-23 RX ORDER — FERROUS SULFATE TAB EC 324 MG (65 MG FE EQUIVALENT) 324 (65 FE) MG
324 TABLET DELAYED RESPONSE ORAL
Qty: 90 TABLET | Refills: 0 | Status: SHIPPED | OUTPATIENT
Start: 2019-09-23 | End: 2020-01-01 | Stop reason: ALTCHOICE

## 2019-10-14 RX ORDER — METOPROLOL TARTRATE 50 MG/1
TABLET, FILM COATED ORAL
Qty: 180 TABLET | Refills: 1 | Status: SHIPPED | OUTPATIENT
Start: 2019-10-14 | End: 2019-01-01

## 2019-10-18 ENCOUNTER — OFFICE VISIT (OUTPATIENT)
Dept: FAMILY MEDICINE CLINIC | Facility: CLINIC | Age: 79
End: 2019-10-18

## 2019-10-18 VITALS
HEART RATE: 66 BPM | HEIGHT: 74 IN | DIASTOLIC BLOOD PRESSURE: 75 MMHG | SYSTOLIC BLOOD PRESSURE: 117 MMHG | BODY MASS INDEX: 21.05 KG/M2 | OXYGEN SATURATION: 93 % | TEMPERATURE: 96.8 F | WEIGHT: 164 LBS

## 2019-10-18 DIAGNOSIS — I10 HYPERTENSION, UNSPECIFIED TYPE: Primary | ICD-10-CM

## 2019-10-18 PROCEDURE — 99213 OFFICE O/P EST LOW 20 MIN: CPT | Performed by: FAMILY MEDICINE

## 2019-10-18 NOTE — PROGRESS NOTES
Subjective   Antony Duenas is a 79 y.o. male.     79-year-old male with dementia hypertension prostate and colon cancer and a colostomy      Hypertension   This is a chronic problem. The current episode started more than 1 year ago. The problem is unchanged. The problem is controlled. Pertinent negatives include no chest pain or shortness of breath. There are no associated agents to hypertension. Risk factors for coronary artery disease include dyslipidemia, male gender and sedentary lifestyle. Past treatments include ACE inhibitors, beta blockers and diuretics. Current antihypertension treatment includes ACE inhibitors, diuretics and beta blockers. The current treatment provides significant improvement. Compliance problems include diet and exercise.  Hypertensive end-organ damage includes PVD.       The following portions of the patient's history were reviewed and updated as appropriate: allergies, current medications, past family history, past medical history, past social history, past surgical history and problem list.    Review of Systems   Respiratory: Negative for shortness of breath.    Cardiovascular: Positive for leg swelling. Negative for chest pain.        Swelling after vascular surgery much improved   Gastrointestinal:        Colostomy working fine but patient focused on it   Psychiatric/Behavioral:        Dementia       Objective   Physical Exam   Cardiovascular: Normal rate and regular rhythm.   Pulmonary/Chest: Effort normal and breath sounds normal.   Musculoskeletal:   Support hose on-left leg still slight swelling   Neurological: He is alert.   Patient does know my name and that he is at the doctor's office   Skin: Skin is warm and dry.   Psychiatric: He has a normal mood and affect. His behavior is normal.   Thought process is obviously off   Nursing note and vitals reviewed.      Assessment/Plan   Patient Active Problem List   Diagnosis   • Cholelithiasis   • Prostate cancer (CMS/HCC)   • Iliac  artery occlusion (CMS/HCC)   • Hyperlipidemia   • History of colon cancer   • Anemia   • Hypertension   • Arthritis   • PVD (peripheral vascular disease) (CMS/HCC)     Antony was seen today for follow-up.    Diagnoses and all orders for this visit:    Hypertension, unspecified type       Return in about 2 months (around 12/18/2019).       Plan wife can tell no difference since Aricept and Namenda-tapered Namenda to 1 a day for 2 weeks and every other day for 2 weeks-see me back 2 months may taper Aricept may have to reduce blood pressure medicine      Electronically signed by Ben Mae MD 10/18/2019

## 2019-12-20 NOTE — PROGRESS NOTES
Subjective   Antony Duenas is a 79 y.o. male.     79-year-old male with hypertension dementia and peripheral vascular disease along with prostate and colon cancer    Hypertension   This is a chronic problem. The current episode started more than 1 year ago. The problem has been resolved since onset. The problem is controlled. Pertinent negatives include no chest pain. There are no associated agents to hypertension. Risk factors for coronary artery disease include dyslipidemia, male gender and sedentary lifestyle. Past treatments include ACE inhibitors, beta blockers and diuretics. Current antihypertension treatment includes ACE inhibitors, diuretics and beta blockers. The current treatment provides moderate improvement. Compliance problems include exercise.  Hypertensive end-organ damage includes PVD. Dementia.       The following portions of the patient's history were reviewed and updated as appropriate: allergies, current medications, past family history, past medical history, past social history, past surgical history and problem list.    Review of Systems   Cardiovascular: Positive for leg swelling. Negative for chest pain.   Neurological:        Dementia-sundowner       Objective   Physical Exam   Cardiovascular: Normal rate and regular rhythm.   Pulmonary/Chest: Effort normal and breath sounds normal.   Musculoskeletal: He exhibits edema.   Left leg greater than right leg swelling-seeing Dr. Colin   Neurological: He is alert.   Psychiatric: He has a normal mood and affect. His behavior is normal.   Nursing note and vitals reviewed.      Assessment/Plan   Antony was seen today for follow-up.    Diagnoses and all orders for this visit:    Fatigue, unspecified type  -     CBC & Differential    Hypertension, unspecified type  -     Basic Metabolic Panel         Plan above see Dr. Colin

## 2019-12-20 NOTE — PROGRESS NOTES
Administered 1,000 mcg of cyanocobalamin into right deltoid patient tolerated well, observed no reactions.

## 2019-12-23 NOTE — TELEPHONE ENCOUNTER
Pt's wife called and stated that the patient's left leg was swollen, but denies any pain, redness or issues walking.  She states that the patient saw Dr. Mae and he told her just to call our office just to let us know in case we wanted to see him sooner than February.  I gave this info to Gisela and she stated that as long as he was only having some swelling, that we could just see him at his scheduled appt.     I again asked the patient's wife if he was having any other issues than swelling, but she said no.  She asked if she should contact the office if he starts having pain or issues walking and I told her yes.

## 2020-01-01 ENCOUNTER — OFFICE VISIT (OUTPATIENT)
Dept: FAMILY MEDICINE CLINIC | Facility: CLINIC | Age: 80
End: 2020-01-01

## 2020-01-01 ENCOUNTER — TELEPHONE (OUTPATIENT)
Dept: FAMILY MEDICINE CLINIC | Facility: CLINIC | Age: 80
End: 2020-01-01

## 2020-01-01 ENCOUNTER — OFFICE VISIT (OUTPATIENT)
Dept: VASCULAR SURGERY | Facility: CLINIC | Age: 80
End: 2020-01-01

## 2020-01-01 ENCOUNTER — READMISSION MANAGEMENT (OUTPATIENT)
Dept: CALL CENTER | Facility: HOSPITAL | Age: 80
End: 2020-01-01

## 2020-01-01 ENCOUNTER — HOSPITAL ENCOUNTER (OUTPATIENT)
Dept: ULTRASOUND IMAGING | Facility: HOSPITAL | Age: 80
Discharge: HOME | End: 2020-02-26
Admitting: NURSE PRACTITIONER

## 2020-01-01 ENCOUNTER — HOSPITAL ENCOUNTER (OUTPATIENT)
Dept: CARDIOLOGY | Facility: HOSPITAL | Age: 80
Discharge: HOME | End: 2020-03-19
Admitting: INTERNAL MEDICINE

## 2020-01-01 ENCOUNTER — HOSPITAL ENCOUNTER (INPATIENT)
Facility: HOSPITAL | Age: 80
LOS: 1 days | Discharge: HOME | End: 2020-03-31
Attending: SURGERY | Admitting: SURGERY

## 2020-01-01 ENCOUNTER — TELEPHONE (OUTPATIENT)
Dept: VASCULAR SURGERY | Facility: CLINIC | Age: 80
End: 2020-01-01

## 2020-01-01 ENCOUNTER — TELEPHONE (OUTPATIENT)
Dept: PODIATRY | Facility: CLINIC | Age: 80
End: 2020-01-01

## 2020-01-01 ENCOUNTER — APPOINTMENT (OUTPATIENT)
Dept: ULTRASOUND IMAGING | Facility: HOSPITAL | Age: 80
End: 2020-01-01

## 2020-01-01 ENCOUNTER — APPOINTMENT (OUTPATIENT)
Dept: GENERAL RADIOLOGY | Facility: HOSPITAL | Age: 80
End: 2020-01-01

## 2020-01-01 ENCOUNTER — CLINICAL SUPPORT (OUTPATIENT)
Dept: FAMILY MEDICINE CLINIC | Facility: CLINIC | Age: 80
End: 2020-01-01

## 2020-01-01 ENCOUNTER — HOSPITAL ENCOUNTER (OUTPATIENT)
Dept: CT IMAGING | Facility: HOSPITAL | Age: 80
Discharge: HOME | End: 2020-03-05
Admitting: NURSE PRACTITIONER

## 2020-01-01 ENCOUNTER — OFFICE VISIT (OUTPATIENT)
Dept: CARDIOLOGY | Facility: CLINIC | Age: 80
End: 2020-01-01

## 2020-01-01 ENCOUNTER — OFFICE VISIT (OUTPATIENT)
Dept: PODIATRY | Facility: CLINIC | Age: 80
End: 2020-01-01

## 2020-01-01 ENCOUNTER — NURSE TRIAGE (OUTPATIENT)
Dept: CALL CENTER | Facility: HOSPITAL | Age: 80
End: 2020-01-01

## 2020-01-01 ENCOUNTER — DOCUMENTATION (OUTPATIENT)
Dept: FAMILY MEDICINE CLINIC | Facility: CLINIC | Age: 80
End: 2020-01-01

## 2020-01-01 ENCOUNTER — HOSPITAL ENCOUNTER (EMERGENCY)
Facility: HOSPITAL | Age: 80
Discharge: HOME | End: 2020-01-22
Attending: EMERGENCY MEDICINE | Admitting: EMERGENCY MEDICINE

## 2020-01-01 ENCOUNTER — PRIOR AUTHORIZATION (OUTPATIENT)
Dept: FAMILY MEDICINE CLINIC | Facility: CLINIC | Age: 80
End: 2020-01-01

## 2020-01-01 ENCOUNTER — PREP FOR SURGERY (OUTPATIENT)
Dept: OTHER | Facility: HOSPITAL | Age: 80
End: 2020-01-01

## 2020-01-01 ENCOUNTER — OUTSIDE FACILITY SERVICE (OUTPATIENT)
Dept: FAMILY MEDICINE CLINIC | Facility: CLINIC | Age: 80
End: 2020-01-01

## 2020-01-01 ENCOUNTER — HOSPITAL ENCOUNTER (OUTPATIENT)
Dept: CARDIOLOGY | Facility: HOSPITAL | Age: 80
Discharge: HOME | End: 2020-03-19

## 2020-01-01 ENCOUNTER — ANESTHESIA (OUTPATIENT)
Dept: PERIOP | Facility: HOSPITAL | Age: 80
End: 2020-01-01

## 2020-01-01 ENCOUNTER — DOCUMENTATION (OUTPATIENT)
Dept: PODIATRY | Facility: CLINIC | Age: 80
End: 2020-01-01

## 2020-01-01 ENCOUNTER — HOSPITAL ENCOUNTER (OUTPATIENT)
Dept: ULTRASOUND IMAGING | Facility: HOSPITAL | Age: 80
Discharge: HOME | End: 2020-02-26

## 2020-01-01 ENCOUNTER — HOSPITAL ENCOUNTER (EMERGENCY)
Facility: HOSPITAL | Age: 80
Discharge: HOME | End: 2020-03-15
Admitting: EMERGENCY MEDICINE

## 2020-01-01 ENCOUNTER — ANESTHESIA EVENT (OUTPATIENT)
Dept: PERIOP | Facility: HOSPITAL | Age: 80
End: 2020-01-01

## 2020-01-01 VITALS
OXYGEN SATURATION: 97 % | SYSTOLIC BLOOD PRESSURE: 112 MMHG | BODY MASS INDEX: 24.38 KG/M2 | WEIGHT: 190 LBS | HEART RATE: 61 BPM | HEIGHT: 74 IN | DIASTOLIC BLOOD PRESSURE: 58 MMHG

## 2020-01-01 VITALS
SYSTOLIC BLOOD PRESSURE: 104 MMHG | HEART RATE: 95 BPM | RESPIRATION RATE: 16 BRPM | BODY MASS INDEX: 27.89 KG/M2 | HEIGHT: 68 IN | WEIGHT: 184 LBS | DIASTOLIC BLOOD PRESSURE: 50 MMHG | OXYGEN SATURATION: 92 % | TEMPERATURE: 97.5 F

## 2020-01-01 VITALS
BODY MASS INDEX: 24.3 KG/M2 | DIASTOLIC BLOOD PRESSURE: 46 MMHG | WEIGHT: 189.38 LBS | SYSTOLIC BLOOD PRESSURE: 109 MMHG | OXYGEN SATURATION: 96 % | HEIGHT: 74 IN | TEMPERATURE: 98.3 F | HEART RATE: 65 BPM | RESPIRATION RATE: 16 BRPM

## 2020-01-01 VITALS
BODY MASS INDEX: 24.38 KG/M2 | HEART RATE: 61 BPM | HEIGHT: 74 IN | OXYGEN SATURATION: 98 % | SYSTOLIC BLOOD PRESSURE: 102 MMHG | WEIGHT: 190 LBS | DIASTOLIC BLOOD PRESSURE: 64 MMHG

## 2020-01-01 VITALS
HEIGHT: 74 IN | BODY MASS INDEX: 24.38 KG/M2 | DIASTOLIC BLOOD PRESSURE: 74 MMHG | WEIGHT: 190 LBS | SYSTOLIC BLOOD PRESSURE: 124 MMHG

## 2020-01-01 VITALS
OXYGEN SATURATION: 98 % | SYSTOLIC BLOOD PRESSURE: 126 MMHG | DIASTOLIC BLOOD PRESSURE: 81 MMHG | TEMPERATURE: 97.9 F | BODY MASS INDEX: 21.87 KG/M2 | HEART RATE: 72 BPM | WEIGHT: 170.4 LBS

## 2020-01-01 VITALS
HEIGHT: 74 IN | TEMPERATURE: 97.8 F | WEIGHT: 194 LBS | BODY MASS INDEX: 24.9 KG/M2 | SYSTOLIC BLOOD PRESSURE: 116 MMHG | HEART RATE: 60 BPM | DIASTOLIC BLOOD PRESSURE: 55 MMHG | RESPIRATION RATE: 18 BRPM | OXYGEN SATURATION: 99 %

## 2020-01-01 VITALS — HEIGHT: 74 IN | WEIGHT: 192 LBS | BODY MASS INDEX: 24.64 KG/M2

## 2020-01-01 VITALS
BODY MASS INDEX: 22.64 KG/M2 | HEART RATE: 64 BPM | HEIGHT: 74 IN | WEIGHT: 176.4 LBS | SYSTOLIC BLOOD PRESSURE: 114 MMHG | TEMPERATURE: 98.4 F | DIASTOLIC BLOOD PRESSURE: 70 MMHG | OXYGEN SATURATION: 98 %

## 2020-01-01 VITALS
RESPIRATION RATE: 18 BRPM | TEMPERATURE: 98 F | HEART RATE: 60 BPM | HEIGHT: 74 IN | WEIGHT: 192 LBS | OXYGEN SATURATION: 99 % | BODY MASS INDEX: 24.64 KG/M2 | SYSTOLIC BLOOD PRESSURE: 118 MMHG | DIASTOLIC BLOOD PRESSURE: 78 MMHG

## 2020-01-01 VITALS
DIASTOLIC BLOOD PRESSURE: 60 MMHG | BODY MASS INDEX: 22.84 KG/M2 | WEIGHT: 178 LBS | OXYGEN SATURATION: 96 % | HEART RATE: 65 BPM | HEIGHT: 74 IN | SYSTOLIC BLOOD PRESSURE: 96 MMHG

## 2020-01-01 VITALS — BODY MASS INDEX: 21.82 KG/M2 | WEIGHT: 170 LBS | HEIGHT: 74 IN

## 2020-01-01 DIAGNOSIS — E78.2 MIXED HYPERLIPIDEMIA: ICD-10-CM

## 2020-01-01 DIAGNOSIS — R31.9 URINARY TRACT INFECTION WITH HEMATURIA, SITE UNSPECIFIED: ICD-10-CM

## 2020-01-01 DIAGNOSIS — I73.9 PVD (PERIPHERAL VASCULAR DISEASE) (HCC): ICD-10-CM

## 2020-01-01 DIAGNOSIS — I10 HYPERTENSION, UNSPECIFIED TYPE: ICD-10-CM

## 2020-01-01 DIAGNOSIS — R06.09 DOE (DYSPNEA ON EXERTION): ICD-10-CM

## 2020-01-01 DIAGNOSIS — Z51.81 THERAPEUTIC DRUG MONITORING: Primary | ICD-10-CM

## 2020-01-01 DIAGNOSIS — Z01.810 PREOP CARDIOVASCULAR EXAM: ICD-10-CM

## 2020-01-01 DIAGNOSIS — Z85.038 HISTORY OF COLON CANCER: ICD-10-CM

## 2020-01-01 DIAGNOSIS — I73.9 PAD (PERIPHERAL ARTERY DISEASE) (HCC): ICD-10-CM

## 2020-01-01 DIAGNOSIS — I65.23 BILATERAL CAROTID ARTERY STENOSIS: Primary | ICD-10-CM

## 2020-01-01 DIAGNOSIS — I89.0 LYMPHEDEMA OF LEFT LEG: ICD-10-CM

## 2020-01-01 DIAGNOSIS — I89.0 LYMPHEDEMA OF BOTH LOWER EXTREMITIES: ICD-10-CM

## 2020-01-01 DIAGNOSIS — I10 ESSENTIAL HYPERTENSION: ICD-10-CM

## 2020-01-01 DIAGNOSIS — F03.91 DEMENTIA WITH BEHAVIORAL DISTURBANCE, UNSPECIFIED DEMENTIA TYPE: ICD-10-CM

## 2020-01-01 DIAGNOSIS — Z79.02 ANTIPLATELET OR ANTITHROMBOTIC LONG-TERM USE: ICD-10-CM

## 2020-01-01 DIAGNOSIS — Z23 NEED FOR PROPHYLACTIC VACCINATION AGAINST STREPTOCOCCUS PNEUMONIAE (PNEUMOCOCCUS): ICD-10-CM

## 2020-01-01 DIAGNOSIS — E53.8 B12 DEFICIENCY: ICD-10-CM

## 2020-01-01 DIAGNOSIS — M79.672 FOOT PAIN, BILATERAL: ICD-10-CM

## 2020-01-01 DIAGNOSIS — I65.23 BILATERAL CAROTID ARTERY STENOSIS: ICD-10-CM

## 2020-01-01 DIAGNOSIS — Z87.891 EX-SMOKER: ICD-10-CM

## 2020-01-01 DIAGNOSIS — F03.90 DEMENTIA WITHOUT BEHAVIORAL DISTURBANCE, UNSPECIFIED DEMENTIA TYPE: ICD-10-CM

## 2020-01-01 DIAGNOSIS — I74.5 ILIAC ARTERY OCCLUSION (HCC): ICD-10-CM

## 2020-01-01 DIAGNOSIS — Z93.3 COLOSTOMY STATUS (HCC): ICD-10-CM

## 2020-01-01 DIAGNOSIS — Z23 NEED FOR PROPHYLACTIC VACCINATION AND INOCULATION AGAINST INFLUENZA: ICD-10-CM

## 2020-01-01 DIAGNOSIS — E78.5 HYPERLIPIDEMIA, UNSPECIFIED HYPERLIPIDEMIA TYPE: ICD-10-CM

## 2020-01-01 DIAGNOSIS — R60.0 LEG EDEMA, LEFT: Primary | ICD-10-CM

## 2020-01-01 DIAGNOSIS — E53.8 VITAMIN B 12 DEFICIENCY: ICD-10-CM

## 2020-01-01 DIAGNOSIS — N39.0 URINARY TRACT INFECTION WITH HEMATURIA, SITE UNSPECIFIED: ICD-10-CM

## 2020-01-01 DIAGNOSIS — F03.91 DEMENTIA WITH BEHAVIORAL DISTURBANCE, UNSPECIFIED DEMENTIA TYPE: Primary | ICD-10-CM

## 2020-01-01 DIAGNOSIS — B35.1 ONYCHOMYCOSIS: Primary | ICD-10-CM

## 2020-01-01 DIAGNOSIS — M54.50 ACUTE LOW BACK PAIN, UNSPECIFIED BACK PAIN LATERALITY, UNSPECIFIED WHETHER SCIATICA PRESENT: Primary | ICD-10-CM

## 2020-01-01 DIAGNOSIS — I89.0 LYMPHEDEMA: ICD-10-CM

## 2020-01-01 DIAGNOSIS — R53.83 FATIGUE, UNSPECIFIED TYPE: Primary | ICD-10-CM

## 2020-01-01 DIAGNOSIS — R82.90 ABNORMAL URINE: ICD-10-CM

## 2020-01-01 DIAGNOSIS — R52 PAIN: Primary | ICD-10-CM

## 2020-01-01 DIAGNOSIS — Z00.00 ANNUAL PHYSICAL EXAM: ICD-10-CM

## 2020-01-01 DIAGNOSIS — R94.31 ABNORMAL ECG: ICD-10-CM

## 2020-01-01 DIAGNOSIS — M79.671 FOOT PAIN, BILATERAL: ICD-10-CM

## 2020-01-01 DIAGNOSIS — I89.0 LYMPHEDEMA: Primary | ICD-10-CM

## 2020-01-01 DIAGNOSIS — C61 PROSTATE CANCER (HCC): ICD-10-CM

## 2020-01-01 LAB
ABO GROUP BLD: NORMAL
ALBUMIN SERPL-MCNC: 3.7 G/DL (ref 3.5–5.2)
ALBUMIN SERPL-MCNC: 3.9 G/DL (ref 3.5–5.2)
ALBUMIN SERPL-MCNC: 4.4 G/DL (ref 3.5–5.2)
ALBUMIN/GLOB SERPL: 1.3 G/DL
ALBUMIN/GLOB SERPL: 1.6 G/DL
ALBUMIN/GLOB SERPL: 2.4 G/DL
ALP SERPL-CCNC: 41 U/L (ref 39–117)
ALP SERPL-CCNC: 52 U/L (ref 39–117)
ALP SERPL-CCNC: 60 U/L (ref 39–117)
ALT SERPL W P-5'-P-CCNC: 11 U/L (ref 1–41)
ALT SERPL W P-5'-P-CCNC: 25 U/L (ref 1–41)
ALT SERPL-CCNC: 17 U/L (ref 1–41)
ANION GAP SERPL CALCULATED.3IONS-SCNC: 10 MMOL/L (ref 5–15)
ANION GAP SERPL CALCULATED.3IONS-SCNC: 8 MMOL/L (ref 5–15)
ANION GAP SERPL CALCULATED.3IONS-SCNC: 9 MMOL/L (ref 5–15)
APTT PPP: 29.3 SECONDS (ref 24.1–35)
APTT PPP: 30.9 SECONDS (ref 24.1–35)
APTT PPP: 33.2 SECONDS (ref 24.1–35)
AST SERPL-CCNC: 16 U/L (ref 1–40)
AST SERPL-CCNC: 17 U/L (ref 1–40)
AST SERPL-CCNC: 23 U/L (ref 1–40)
BACTERIA UR CULT: ABNORMAL
BACTERIA UR CULT: NORMAL
BACTERIA UR CULT: NORMAL
BASOPHILS # BLD AUTO: 0.05 10*3/MM3 (ref 0–0.2)
BASOPHILS # BLD AUTO: 0.06 10*3/MM3 (ref 0–0.2)
BASOPHILS # BLD AUTO: 0.06 10*3/MM3 (ref 0–0.2)
BASOPHILS # BLD AUTO: 0.07 10*3/MM3 (ref 0–0.2)
BASOPHILS NFR BLD AUTO: 0.7 % (ref 0–1.5)
BASOPHILS NFR BLD AUTO: 0.8 % (ref 0–1.5)
BASOPHILS NFR BLD AUTO: 0.9 % (ref 0–1.5)
BASOPHILS NFR BLD AUTO: 1.3 % (ref 0–1.5)
BH CV ECHO MEAS - AO MAX PG (FULL): 1.2 MMHG
BH CV ECHO MEAS - AO MAX PG: 5.7 MMHG
BH CV ECHO MEAS - AO MEAN PG (FULL): 1 MMHG
BH CV ECHO MEAS - AO MEAN PG: 3 MMHG
BH CV ECHO MEAS - AO ROOT AREA (BSA CORRECTED): 1.6
BH CV ECHO MEAS - AO ROOT AREA: 9.1 CM^2
BH CV ECHO MEAS - AO ROOT DIAM: 3.4 CM
BH CV ECHO MEAS - AO V2 MAX: 119 CM/SEC
BH CV ECHO MEAS - AO V2 MEAN: 72.5 CM/SEC
BH CV ECHO MEAS - AO V2 VTI: 34.2 CM
BH CV ECHO MEAS - AVA(I,A): 4.2 CM^2
BH CV ECHO MEAS - AVA(I,D): 4.2 CM^2
BH CV ECHO MEAS - AVA(V,A): 4 CM^2
BH CV ECHO MEAS - AVA(V,D): 4 CM^2
BH CV ECHO MEAS - BSA(HAYCOCK): 2.1 M^2
BH CV ECHO MEAS - BSA: 2.1 M^2
BH CV ECHO MEAS - BZI_BMI: 24.7 KILOGRAMS/M^2
BH CV ECHO MEAS - BZI_METRIC_HEIGHT: 188 CM
BH CV ECHO MEAS - BZI_METRIC_WEIGHT: 87.1 KG
BH CV ECHO MEAS - EDV(CUBED): 172.8 ML
BH CV ECHO MEAS - EDV(MOD-SP4): 135 ML
BH CV ECHO MEAS - EDV(TEICH): 151.8 ML
BH CV ECHO MEAS - EF(CUBED): 63.8 %
BH CV ECHO MEAS - EF(MOD-SP4): 62.4 %
BH CV ECHO MEAS - EF(TEICH): 54.7 %
BH CV ECHO MEAS - ESV(CUBED): 62.6 ML
BH CV ECHO MEAS - ESV(MOD-SP4): 50.7 ML
BH CV ECHO MEAS - ESV(TEICH): 68.8 ML
BH CV ECHO MEAS - FS: 28.7 %
BH CV ECHO MEAS - IVS/LVPW: 1
BH CV ECHO MEAS - IVSD: 1.2 CM
BH CV ECHO MEAS - LA DIMENSION: 4.1 CM
BH CV ECHO MEAS - LA/AO: 1.2
BH CV ECHO MEAS - LAT PEAK E' VEL: 8.4 CM/SEC
BH CV ECHO MEAS - LV DIASTOLIC VOL/BSA (35-75): 63.2 ML/M^2
BH CV ECHO MEAS - LV MASS(C)D: 278 GRAMS
BH CV ECHO MEAS - LV MASS(C)DI: 130.2 GRAMS/M^2
BH CV ECHO MEAS - LV MAX PG: 4.5 MMHG
BH CV ECHO MEAS - LV MEAN PG: 2 MMHG
BH CV ECHO MEAS - LV SYSTOLIC VOL/BSA (12-30): 23.7 ML/M^2
BH CV ECHO MEAS - LV V1 MAX: 106 CM/SEC
BH CV ECHO MEAS - LV V1 MEAN: 61.2 CM/SEC
BH CV ECHO MEAS - LV V1 VTI: 31.5 CM
BH CV ECHO MEAS - LVIDD: 5.6 CM
BH CV ECHO MEAS - LVIDS: 4 CM
BH CV ECHO MEAS - LVLD AP4: 7.5 CM
BH CV ECHO MEAS - LVLS AP4: 6.4 CM
BH CV ECHO MEAS - LVOT AREA (M): 4.5 CM^2
BH CV ECHO MEAS - LVOT AREA: 4.5 CM^2
BH CV ECHO MEAS - LVOT DIAM: 2.4 CM
BH CV ECHO MEAS - LVPWD: 1.2 CM
BH CV ECHO MEAS - MED PEAK E' VEL: 8.92 CM/SEC
BH CV ECHO MEAS - MV A MAX VEL: 68.9 CM/SEC
BH CV ECHO MEAS - MV DEC TIME: 0.31 SEC
BH CV ECHO MEAS - MV E MAX VEL: 84.4 CM/SEC
BH CV ECHO MEAS - MV E/A: 1.2
BH CV ECHO MEAS - RVDD: 4.2 CM
BH CV ECHO MEAS - SI(AO): 145.4 ML/M^2
BH CV ECHO MEAS - SI(CUBED): 51.6 ML/M^2
BH CV ECHO MEAS - SI(LVOT): 66.7 ML/M^2
BH CV ECHO MEAS - SI(MOD-SP4): 39.5 ML/M^2
BH CV ECHO MEAS - SI(TEICH): 38.9 ML/M^2
BH CV ECHO MEAS - SV(AO): 310.5 ML
BH CV ECHO MEAS - SV(CUBED): 110.2 ML
BH CV ECHO MEAS - SV(LVOT): 142.5 ML
BH CV ECHO MEAS - SV(MOD-SP4): 84.3 ML
BH CV ECHO MEAS - SV(TEICH): 83 ML
BH CV ECHO MEASUREMENTS AVERAGE E/E' RATIO: 9.75
BH CV STRESS ATROPINE STAGE 2: 0.3
BH CV STRESS BP STAGE 1: NORMAL
BH CV STRESS BP STAGE 2: NORMAL
BH CV STRESS DOSE DOBUTAMINE STAGE 1: 10
BH CV STRESS DOSE DOBUTAMINE STAGE 2: 20
BH CV STRESS DURATION MIN STAGE 1: 3
BH CV STRESS DURATION MIN STAGE 2: 4
BH CV STRESS DURATION SEC STAGE 1: 0
BH CV STRESS DURATION SEC STAGE 2: 55
BH CV STRESS ECHO POST STRESS EJECTION FRACTION EF: 65 %
BH CV STRESS HR STAGE 1: 67
BH CV STRESS HR STAGE 2: 121
BH CV STRESS PROTOCOL 1: NORMAL
BH CV STRESS RECOVERY BP: NORMAL MMHG
BH CV STRESS RECOVERY HR: 92 BPM
BH CV STRESS STAGE 1: 1
BH CV STRESS STAGE 2: 2
BILIRUB BLD-MCNC: NEGATIVE MG/DL
BILIRUB BLD-MCNC: NEGATIVE MG/DL
BILIRUB SERPL-MCNC: 0.2 MG/DL (ref 0.2–1.2)
BILIRUB SERPL-MCNC: 0.4 MG/DL (ref 0.2–1.2)
BILIRUB SERPL-MCNC: 0.5 MG/DL (ref 0–1.2)
BLD GP AB SCN SERPL QL: NEGATIVE
BUN BLD-MCNC: 12 MG/DL (ref 8–23)
BUN BLD-MCNC: 18 MG/DL (ref 8–23)
BUN BLD-MCNC: 19 MG/DL (ref 8–23)
BUN SERPL-MCNC: 16 MG/DL (ref 8–23)
BUN/CREAT SERPL: 12.2 (ref 7–25)
BUN/CREAT SERPL: 15 (ref 7–25)
BUN/CREAT SERPL: 17.1 (ref 7–25)
BUN/CREAT SERPL: 17.6 (ref 7–25)
CALCIUM SERPL-MCNC: 9.4 MG/DL (ref 8.6–10.5)
CALCIUM SPEC-SCNC: 8.9 MG/DL (ref 8.6–10.5)
CALCIUM SPEC-SCNC: 9 MG/DL (ref 8.6–10.5)
CALCIUM SPEC-SCNC: 9.1 MG/DL (ref 8.6–10.5)
CHLORIDE SERPL-SCNC: 102 MMOL/L (ref 98–107)
CHLORIDE SERPL-SCNC: 103 MMOL/L (ref 98–107)
CHOLEST SERPL-MCNC: 106 MG/DL (ref 0–200)
CLARITY, POC: ABNORMAL
CLARITY, POC: CLEAR
CO2 SERPL-SCNC: 26.2 MMOL/L (ref 22–29)
CO2 SERPL-SCNC: 28 MMOL/L (ref 22–29)
CO2 SERPL-SCNC: 28 MMOL/L (ref 22–29)
CO2 SERPL-SCNC: 29 MMOL/L (ref 22–29)
COLOR UR: YELLOW
COLOR UR: YELLOW
CREAT BLD-MCNC: 0.98 MG/DL (ref 0.76–1.27)
CREAT BLD-MCNC: 1.02 MG/DL (ref 0.76–1.27)
CREAT BLD-MCNC: 1.11 MG/DL (ref 0.76–1.27)
CREAT BLDA-MCNC: 1.3 MG/DL (ref 0.6–1.3)
CREAT SERPL-MCNC: 1.07 MG/DL (ref 0.76–1.27)
CYTO UR: NORMAL
DEPRECATED RDW RBC AUTO: 46.8 FL (ref 37–54)
DEPRECATED RDW RBC AUTO: 48.2 FL (ref 37–54)
DEPRECATED RDW RBC AUTO: 50.1 FL (ref 37–54)
DRUGS UR: NORMAL
EOSINOPHIL # BLD AUTO: 0.06 10*3/MM3 (ref 0–0.4)
EOSINOPHIL # BLD AUTO: 0.1 10*3/MM3 (ref 0–0.4)
EOSINOPHIL # BLD AUTO: 0.1 10*3/MM3 (ref 0–0.4)
EOSINOPHIL # BLD AUTO: 0.11 10*3/MM3 (ref 0–0.4)
EOSINOPHIL NFR BLD AUTO: 0.9 % (ref 0.3–6.2)
EOSINOPHIL NFR BLD AUTO: 1.4 % (ref 0.3–6.2)
EOSINOPHIL NFR BLD AUTO: 1.4 % (ref 0.3–6.2)
EOSINOPHIL NFR BLD AUTO: 2 % (ref 0.3–6.2)
ERYTHROCYTE [DISTWIDTH] IN BLOOD BY AUTOMATED COUNT: 14 % (ref 12.3–15.4)
ERYTHROCYTE [DISTWIDTH] IN BLOOD BY AUTOMATED COUNT: 14.4 % (ref 12.3–15.4)
ERYTHROCYTE [DISTWIDTH] IN BLOOD BY AUTOMATED COUNT: 14.5 % (ref 12.3–15.4)
ERYTHROCYTE [DISTWIDTH] IN BLOOD BY AUTOMATED COUNT: 14.8 % (ref 12.3–15.4)
FLUAV AG NPH QL: NEGATIVE
FLUBV AG NPH QL IA: NEGATIVE
FOLATE SERPL-MCNC: 8.08 NG/ML (ref 4.78–24.2)
GFR SERPL CREATININE-BSD FRML MDRD: 64 ML/MIN/1.73
GFR SERPL CREATININE-BSD FRML MDRD: 70 ML/MIN/1.73
GFR SERPL CREATININE-BSD FRML MDRD: 74 ML/MIN/1.73
GLOBULIN SER CALC-MCNC: 1.8 GM/DL
GLOBULIN UR ELPH-MCNC: 2.5 GM/DL
GLOBULIN UR ELPH-MCNC: 2.9 GM/DL
GLUCOSE BLD-MCNC: 85 MG/DL (ref 65–99)
GLUCOSE BLD-MCNC: 88 MG/DL (ref 65–99)
GLUCOSE BLD-MCNC: 98 MG/DL (ref 65–99)
GLUCOSE SERPL-MCNC: 89 MG/DL (ref 65–99)
GLUCOSE UR STRIP-MCNC: NEGATIVE MG/DL
GLUCOSE UR STRIP-MCNC: NEGATIVE MG/DL
HCT VFR BLD AUTO: 33.3 % (ref 37.5–51)
HCT VFR BLD AUTO: 33.6 % (ref 37.5–51)
HCT VFR BLD AUTO: 34.3 % (ref 37.5–51)
HCT VFR BLD AUTO: 34.4 % (ref 37.5–51)
HDLC SERPL-MCNC: 44 MG/DL (ref 40–60)
HGB BLD-MCNC: 11 G/DL (ref 13–17.7)
HGB BLD-MCNC: 11.2 G/DL (ref 13–17.7)
HGB BLD-MCNC: 11.3 G/DL (ref 13–17.7)
HGB BLD-MCNC: 11.5 G/DL (ref 13–17.7)
IMM GRANULOCYTES # BLD AUTO: 0.02 10*3/MM3 (ref 0–0.05)
IMM GRANULOCYTES # BLD AUTO: 0.02 10*3/MM3 (ref 0–0.05)
IMM GRANULOCYTES # BLD AUTO: 0.03 10*3/MM3 (ref 0–0.05)
IMM GRANULOCYTES # BLD AUTO: 0.04 10*3/MM3 (ref 0–0.05)
IMM GRANULOCYTES NFR BLD AUTO: 0.3 % (ref 0–0.5)
IMM GRANULOCYTES NFR BLD AUTO: 0.4 % (ref 0–0.5)
IMM GRANULOCYTES NFR BLD AUTO: 0.4 % (ref 0–0.5)
IMM GRANULOCYTES NFR BLD AUTO: 0.6 % (ref 0–0.5)
INR PPP: 0.96 (ref 0.91–1.09)
INR PPP: 1.01 (ref 0.91–1.09)
INR PPP: 1.04 (ref 0.91–1.09)
KETONES UR QL: ABNORMAL
KETONES UR QL: NEGATIVE
LAB AP CASE REPORT: NORMAL
LDLC SERPL CALC-MCNC: 43 MG/DL (ref 0–100)
LEFT ATRIUM VOLUME INDEX: 25.5 ML/M2
LEFT ATRIUM VOLUME: 54.5 CM3
LEUKOCYTE EST, POC: ABNORMAL
LEUKOCYTE EST, POC: NEGATIVE
LV EF 2D ECHO EST: 55 %
LV EF 2D ECHO EST: 60 %
LYMPHOCYTES # BLD AUTO: 0.58 10*3/MM3 (ref 0.7–3.1)
LYMPHOCYTES # BLD AUTO: 0.66 10*3/MM3 (ref 0.7–3.1)
LYMPHOCYTES # BLD AUTO: 0.7 10*3/MM3 (ref 0.7–3.1)
LYMPHOCYTES # BLD AUTO: 0.73 10*3/MM3 (ref 0.7–3.1)
LYMPHOCYTES NFR BLD AUTO: 10.1 % (ref 19.6–45.3)
LYMPHOCYTES NFR BLD AUTO: 12.7 % (ref 19.6–45.3)
LYMPHOCYTES NFR BLD AUTO: 9 % (ref 19.6–45.3)
LYMPHOCYTES NFR BLD AUTO: 9.3 % (ref 19.6–45.3)
MAXIMAL PREDICTED HEART RATE: 141 BPM
MAXIMAL PREDICTED HEART RATE: 141 BPM
MCH RBC QN AUTO: 30.1 PG (ref 26.6–33)
MCH RBC QN AUTO: 30.1 PG (ref 26.6–33)
MCH RBC QN AUTO: 30.3 PG (ref 26.6–33)
MCH RBC QN AUTO: 30.6 PG (ref 26.6–33)
MCHC RBC AUTO-ENTMCNC: 32.7 G/DL (ref 31.5–35.7)
MCHC RBC AUTO-ENTMCNC: 32.8 G/DL (ref 31.5–35.7)
MCHC RBC AUTO-ENTMCNC: 33.5 G/DL (ref 31.5–35.7)
MCHC RBC AUTO-ENTMCNC: 33.6 G/DL (ref 31.5–35.7)
MCV RBC AUTO: 89.5 FL (ref 79–97)
MCV RBC AUTO: 91.2 FL (ref 79–97)
MCV RBC AUTO: 92.1 FL (ref 79–97)
MCV RBC AUTO: 92.2 FL (ref 79–97)
MONOCYTES # BLD AUTO: 0.46 10*3/MM3 (ref 0.1–0.9)
MONOCYTES # BLD AUTO: 0.48 10*3/MM3 (ref 0.1–0.9)
MONOCYTES # BLD AUTO: 0.58 10*3/MM3 (ref 0.1–0.9)
MONOCYTES # BLD AUTO: 0.59 10*3/MM3 (ref 0.1–0.9)
MONOCYTES NFR BLD AUTO: 7.1 % (ref 5–12)
MONOCYTES NFR BLD AUTO: 8.1 % (ref 5–12)
MONOCYTES NFR BLD AUTO: 8.2 % (ref 5–12)
MONOCYTES NFR BLD AUTO: 8.7 % (ref 5–12)
NEUTROPHILS # BLD AUTO: 4.15 10*3/MM3 (ref 1.7–7)
NEUTROPHILS # BLD AUTO: 5.24 10*3/MM3 (ref 1.7–7)
NEUTROPHILS # BLD AUTO: 5.66 10*3/MM3 (ref 1.7–7)
NEUTROPHILS # BLD AUTO: 5.75 10*3/MM3 (ref 1.7–7)
NEUTROPHILS NFR BLD AUTO: 74.9 % (ref 42.7–76)
NEUTROPHILS NFR BLD AUTO: 79.2 % (ref 42.7–76)
NEUTROPHILS NFR BLD AUTO: 80.1 % (ref 42.7–76)
NEUTROPHILS NFR BLD AUTO: 81.5 % (ref 42.7–76)
NITRITE UR-MCNC: POSITIVE MG/ML
NITRITE UR-MCNC: POSITIVE MG/ML
NRBC BLD AUTO-RTO: 0 /100 WBC (ref 0–0.2)
NT-PROBNP SERPL-MCNC: 164.7 PG/ML (ref 5–1800)
OTHER ANTIBIOTIC SUSC ISLT: ABNORMAL
PATH REPORT.FINAL DX SPEC: NORMAL
PATH REPORT.GROSS SPEC: NORMAL
PERCENT MAX PREDICTED HR: 85.82 %
PH UR: 5.5 [PH] (ref 5–8)
PH UR: 5.5 [PH] (ref 5–8)
PLATELET # BLD AUTO: 359 10*3/MM3 (ref 140–450)
PLATELET # BLD AUTO: 376 10*3/MM3 (ref 140–450)
PLATELET # BLD AUTO: 431 10*3/MM3 (ref 140–450)
PLATELET # BLD AUTO: 478 10*3/MM3 (ref 140–450)
PMV BLD AUTO: 10 FL (ref 6–12)
PMV BLD AUTO: 9.8 FL (ref 6–12)
PMV BLD AUTO: 9.9 FL (ref 6–12)
POTASSIUM BLD-SCNC: 3.9 MMOL/L (ref 3.5–5.2)
POTASSIUM BLD-SCNC: 4.1 MMOL/L (ref 3.5–5.2)
POTASSIUM BLD-SCNC: 4.4 MMOL/L (ref 3.5–5.2)
POTASSIUM SERPL-SCNC: 4.3 MMOL/L (ref 3.5–5.2)
PROT SERPL-MCNC: 6.2 G/DL (ref 6–8.5)
PROT SERPL-MCNC: 6.4 G/DL (ref 6–8.5)
PROT SERPL-MCNC: 6.6 G/DL (ref 6–8.5)
PROT UR STRIP-MCNC: ABNORMAL MG/DL
PROT UR STRIP-MCNC: NEGATIVE MG/DL
PROTHROMBIN TIME: 13.1 SECONDS (ref 11.9–14.6)
PROTHROMBIN TIME: 13.2 SECONDS (ref 11.9–14.6)
PROTHROMBIN TIME: 13.5 SECONDS (ref 11.9–14.6)
RBC # BLD AUTO: 3.65 10*6/MM3 (ref 4.14–5.8)
RBC # BLD AUTO: 3.72 10*6/MM3 (ref 4.14–5.8)
RBC # BLD AUTO: 3.73 10*6/MM3 (ref 4.14–5.8)
RBC # BLD AUTO: 3.76 10*6/MM3 (ref 4.14–5.8)
RBC # UR STRIP: ABNORMAL /UL
RBC # UR STRIP: NEGATIVE /UL
RH BLD: POSITIVE
SODIUM BLD-SCNC: 139 MMOL/L (ref 136–145)
SODIUM BLD-SCNC: 139 MMOL/L (ref 136–145)
SODIUM BLD-SCNC: 141 MMOL/L (ref 136–145)
SODIUM SERPL-SCNC: 141 MMOL/L (ref 136–145)
SP GR UR: 1.02 (ref 1–1.03)
SP GR UR: 1.03 (ref 1–1.03)
STRESS BASELINE BP: NORMAL MMHG
STRESS BASELINE HR: 67 BPM
STRESS PERCENT HR: 101 %
STRESS POST EXERCISE DUR MIN: 7 MIN
STRESS POST EXERCISE DUR SEC: 55 SEC
STRESS POST PEAK BP: NORMAL MMHG
STRESS POST PEAK HR: 121 BPM
STRESS TARGET HR: 120 BPM
STRESS TARGET HR: 120 BPM
T&S EXPIRATION DATE: NORMAL
T4 FREE SERPL-MCNC: 1.42 NG/DL (ref 0.93–1.7)
TRIGL SERPL-MCNC: 96 MG/DL (ref 0–150)
TSH SERPL DL<=0.005 MIU/L-ACNC: 0.96 UIU/ML (ref 0.27–4.2)
UROBILINOGEN UR QL: NORMAL
UROBILINOGEN UR QL: NORMAL
VIT B12 SERPL-MCNC: 956 PG/ML (ref 211–946)
VLDLC SERPL CALC-MCNC: 19.2 MG/DL
WBC # BLD AUTO: 6.44 10*3/MM3 (ref 3.4–10.8)
WBC NRBC COR # BLD: 5.53 10*3/MM3 (ref 3.4–10.8)
WBC NRBC COR # BLD: 7.07 10*3/MM3 (ref 3.4–10.8)
WBC NRBC COR # BLD: 7.26 10*3/MM3 (ref 3.4–10.8)

## 2020-01-01 PROCEDURE — 25010000003 DOBUTAMINE PER 250 MG: Performed by: INTERNAL MEDICINE

## 2020-01-01 PROCEDURE — G0008 ADMIN INFLUENZA VIRUS VAC: HCPCS | Performed by: FAMILY MEDICINE

## 2020-01-01 PROCEDURE — 25010000002 FENTANYL CITRATE (PF) 250 MCG/5ML SOLUTION: Performed by: NURSE ANESTHETIST, CERTIFIED REGISTERED

## 2020-01-01 PROCEDURE — 25010000002 CEFAZOLIN PER 500 MG: Performed by: SURGERY

## 2020-01-01 PROCEDURE — 99214 OFFICE O/P EST MOD 30 MIN: CPT | Performed by: FAMILY MEDICINE

## 2020-01-01 PROCEDURE — 25010000003 HEPARIN LOCK FLUCH PER 10 UNITS: Performed by: INTERNAL MEDICINE

## 2020-01-01 PROCEDURE — 93924 LWR XTR VASC STDY BILAT: CPT | Performed by: SURGERY

## 2020-01-01 PROCEDURE — 93352 ADMIN ECG CONTRAST AGENT: CPT | Performed by: INTERNAL MEDICINE

## 2020-01-01 PROCEDURE — 70498 CT ANGIOGRAPHY NECK: CPT

## 2020-01-01 PROCEDURE — P9041 ALBUMIN (HUMAN),5%, 50ML: HCPCS

## 2020-01-01 PROCEDURE — 86850 RBC ANTIBODY SCREEN: CPT | Performed by: NURSE PRACTITIONER

## 2020-01-01 PROCEDURE — 93882 EXTRACRANIAL UNI/LTD STUDY: CPT

## 2020-01-01 PROCEDURE — 25010000002 HEPARIN (PORCINE) PER 1000 UNITS: Performed by: SURGERY

## 2020-01-01 PROCEDURE — 93000 ELECTROCARDIOGRAM COMPLETE: CPT | Performed by: INTERNAL MEDICINE

## 2020-01-01 PROCEDURE — 25010000003 LIDOCAINE 1 % SOLUTION: Performed by: SURGERY

## 2020-01-01 PROCEDURE — 85025 COMPLETE CBC W/AUTO DIFF WBC: CPT | Performed by: EMERGENCY MEDICINE

## 2020-01-01 PROCEDURE — 25010000002 PERFLUTREN 6.52 MG/ML SUSPENSION: Performed by: INTERNAL MEDICINE

## 2020-01-01 PROCEDURE — 87804 INFLUENZA ASSAY W/OPTIC: CPT | Performed by: NURSE PRACTITIONER

## 2020-01-01 PROCEDURE — 85730 THROMBOPLASTIN TIME PARTIAL: CPT | Performed by: NURSE PRACTITIONER

## 2020-01-01 PROCEDURE — 94799 UNLISTED PULMONARY SVC/PX: CPT

## 2020-01-01 PROCEDURE — 88304 TISSUE EXAM BY PATHOLOGIST: CPT | Performed by: SURGERY

## 2020-01-01 PROCEDURE — 88311 DECALCIFY TISSUE: CPT | Performed by: SURGERY

## 2020-01-01 PROCEDURE — 85025 COMPLETE CBC W/AUTO DIFF WBC: CPT | Performed by: NURSE PRACTITIONER

## 2020-01-01 PROCEDURE — 93882 EXTRACRANIAL UNI/LTD STUDY: CPT | Performed by: SURGERY

## 2020-01-01 PROCEDURE — 99214 OFFICE O/P EST MOD 30 MIN: CPT | Performed by: SURGERY

## 2020-01-01 PROCEDURE — 90653 IIV ADJUVANT VACCINE IM: CPT | Performed by: FAMILY MEDICINE

## 2020-01-01 PROCEDURE — 25010000002 ONDANSETRON PER 1 MG: Performed by: ANESTHESIOLOGY

## 2020-01-01 PROCEDURE — 99283 EMERGENCY DEPT VISIT LOW MDM: CPT

## 2020-01-01 PROCEDURE — 99214 OFFICE O/P EST MOD 30 MIN: CPT | Performed by: NURSE PRACTITIONER

## 2020-01-01 PROCEDURE — 82565 ASSAY OF CREATININE: CPT

## 2020-01-01 PROCEDURE — 93018 CV STRESS TEST I&R ONLY: CPT | Performed by: INTERNAL MEDICINE

## 2020-01-01 PROCEDURE — 80053 COMPREHEN METABOLIC PANEL: CPT | Performed by: EMERGENCY MEDICINE

## 2020-01-01 PROCEDURE — 11721 DEBRIDE NAIL 6 OR MORE: CPT | Performed by: PODIATRIST

## 2020-01-01 PROCEDURE — 99307 SBSQ NF CARE SF MDM 10: CPT | Performed by: FAMILY MEDICINE

## 2020-01-01 PROCEDURE — 93350 STRESS TTE ONLY: CPT

## 2020-01-01 PROCEDURE — C1768 GRAFT, VASCULAR: HCPCS | Performed by: SURGERY

## 2020-01-01 PROCEDURE — 93971 EXTREMITY STUDY: CPT

## 2020-01-01 PROCEDURE — 25010000002 NEOSTIGMINE 10 MG/10ML SOLUTION: Performed by: NURSE ANESTHETIST, CERTIFIED REGISTERED

## 2020-01-01 PROCEDURE — 93017 CV STRESS TEST TRACING ONLY: CPT

## 2020-01-01 PROCEDURE — G0009 ADMIN PNEUMOCOCCAL VACCINE: HCPCS | Performed by: FAMILY MEDICINE

## 2020-01-01 PROCEDURE — 93306 TTE W/DOPPLER COMPLETE: CPT | Performed by: INTERNAL MEDICINE

## 2020-01-01 PROCEDURE — 96372 THER/PROPH/DIAG INJ SC/IM: CPT | Performed by: FAMILY MEDICINE

## 2020-01-01 PROCEDURE — 25010000002 HEPARIN (PORCINE) PER 1000 UNITS: Performed by: NURSE ANESTHETIST, CERTIFIED REGISTERED

## 2020-01-01 PROCEDURE — 99214 OFFICE O/P EST MOD 30 MIN: CPT | Performed by: INTERNAL MEDICINE

## 2020-01-01 PROCEDURE — 90732 PPSV23 VACC 2 YRS+ SUBQ/IM: CPT | Performed by: FAMILY MEDICINE

## 2020-01-01 PROCEDURE — 86900 BLOOD TYPING SEROLOGIC ABO: CPT | Performed by: NURSE PRACTITIONER

## 2020-01-01 PROCEDURE — 93880 EXTRACRANIAL BILAT STUDY: CPT | Performed by: SURGERY

## 2020-01-01 PROCEDURE — 93971 EXTREMITY STUDY: CPT | Performed by: SURGERY

## 2020-01-01 PROCEDURE — 99024 POSTOP FOLLOW-UP VISIT: CPT | Performed by: SURGERY

## 2020-01-01 PROCEDURE — 80048 BASIC METABOLIC PNL TOTAL CA: CPT | Performed by: NURSE PRACTITIONER

## 2020-01-01 PROCEDURE — 93923 UPR/LXTR ART STDY 3+ LVLS: CPT

## 2020-01-01 PROCEDURE — 93306 TTE W/DOPPLER COMPLETE: CPT

## 2020-01-01 PROCEDURE — 85610 PROTHROMBIN TIME: CPT | Performed by: NURSE PRACTITIONER

## 2020-01-01 PROCEDURE — 71045 X-RAY EXAM CHEST 1 VIEW: CPT

## 2020-01-01 PROCEDURE — 03UK0KZ SUPPLEMENT RIGHT INTERNAL CAROTID ARTERY WITH NONAUTOLOGOUS TISSUE SUBSTITUTE, OPEN APPROACH: ICD-10-PCS | Performed by: SURGERY

## 2020-01-01 PROCEDURE — 25010000002 ONDANSETRON PER 1 MG: Performed by: NURSE ANESTHETIST, CERTIFIED REGISTERED

## 2020-01-01 PROCEDURE — 25010000002 ALBUMIN HUMAN 5% PER 50 ML

## 2020-01-01 PROCEDURE — 99213 OFFICE O/P EST LOW 20 MIN: CPT | Performed by: NURSE PRACTITIONER

## 2020-01-01 PROCEDURE — 80053 COMPREHEN METABOLIC PANEL: CPT | Performed by: NURSE PRACTITIONER

## 2020-01-01 PROCEDURE — 03CK0ZZ EXTIRPATION OF MATTER FROM RIGHT INTERNAL CAROTID ARTERY, OPEN APPROACH: ICD-10-PCS | Performed by: SURGERY

## 2020-01-01 PROCEDURE — 81003 URINALYSIS AUTO W/O SCOPE: CPT | Performed by: FAMILY MEDICINE

## 2020-01-01 PROCEDURE — 85610 PROTHROMBIN TIME: CPT | Performed by: EMERGENCY MEDICINE

## 2020-01-01 PROCEDURE — G0439 PPPS, SUBSEQ VISIT: HCPCS | Performed by: FAMILY MEDICINE

## 2020-01-01 PROCEDURE — 0 IOPAMIDOL PER 1 ML: Performed by: NURSE PRACTITIONER

## 2020-01-01 PROCEDURE — 25010000002 PHENYLEPHRINE 10 MG/ML SOLUTION 5 ML VIAL: Performed by: NURSE ANESTHETIST, CERTIFIED REGISTERED

## 2020-01-01 PROCEDURE — 99203 OFFICE O/P NEW LOW 30 MIN: CPT | Performed by: PODIATRIST

## 2020-01-01 PROCEDURE — 86901 BLOOD TYPING SEROLOGIC RH(D): CPT | Performed by: NURSE PRACTITIONER

## 2020-01-01 PROCEDURE — 85730 THROMBOPLASTIN TIME PARTIAL: CPT | Performed by: EMERGENCY MEDICINE

## 2020-01-01 PROCEDURE — 93350 STRESS TTE ONLY: CPT | Performed by: INTERNAL MEDICINE

## 2020-01-01 PROCEDURE — 99024 POSTOP FOLLOW-UP VISIT: CPT | Performed by: NURSE PRACTITIONER

## 2020-01-01 PROCEDURE — 35301 RECHANNELING OF ARTERY: CPT | Performed by: SURGERY

## 2020-01-01 PROCEDURE — 83880 ASSAY OF NATRIURETIC PEPTIDE: CPT | Performed by: NURSE PRACTITIONER

## 2020-01-01 PROCEDURE — 93880 EXTRACRANIAL BILAT STUDY: CPT

## 2020-01-01 DEVICE — PTCH VASC XENOSURE BIO 0.8X8CM: Type: IMPLANTABLE DEVICE | Site: CAROTID | Status: FUNCTIONAL

## 2020-01-01 RX ORDER — MORPHINE SULFATE 2 MG/ML
2 INJECTION, SOLUTION INTRAMUSCULAR; INTRAVENOUS EVERY 4 HOURS PRN
Status: DISCONTINUED | OUTPATIENT
Start: 2020-01-01 | End: 2020-01-01 | Stop reason: HOSPADM

## 2020-01-01 RX ORDER — ASPIRIN 81 MG/1
81 TABLET ORAL DAILY
COMMUNITY

## 2020-01-01 RX ORDER — ATORVASTATIN CALCIUM 10 MG/1
10 TABLET, FILM COATED ORAL NIGHTLY
Status: DISCONTINUED | OUTPATIENT
Start: 2020-01-01 | End: 2020-01-01 | Stop reason: HOSPADM

## 2020-01-01 RX ORDER — GLYCOPYRROLATE 0.2 MG/ML
INJECTION INTRAMUSCULAR; INTRAVENOUS AS NEEDED
Status: DISCONTINUED | OUTPATIENT
Start: 2020-01-01 | End: 2020-01-01 | Stop reason: SURG

## 2020-01-01 RX ORDER — SODIUM CHLORIDE, SODIUM LACTATE, POTASSIUM CHLORIDE, CALCIUM CHLORIDE 600; 310; 30; 20 MG/100ML; MG/100ML; MG/100ML; MG/100ML
100 INJECTION, SOLUTION INTRAVENOUS CONTINUOUS
Status: DISCONTINUED | OUTPATIENT
Start: 2020-01-01 | End: 2020-01-01 | Stop reason: HOSPADM

## 2020-01-01 RX ORDER — FERROUS SULFATE 325(65) MG
325 TABLET ORAL
Status: DISCONTINUED | OUTPATIENT
Start: 2020-01-01 | End: 2020-01-01 | Stop reason: HOSPADM

## 2020-01-01 RX ORDER — BUPIVACAINE HCL/0.9 % NACL/PF 0.1 %
2 PLASTIC BAG, INJECTION (ML) EPIDURAL ONCE
Status: COMPLETED | OUTPATIENT
Start: 2020-01-01 | End: 2020-01-01

## 2020-01-01 RX ORDER — ALBUMIN, HUMAN INJ 5% 5 %
SOLUTION INTRAVENOUS
Status: COMPLETED
Start: 2020-01-01 | End: 2020-01-01

## 2020-01-01 RX ORDER — SODIUM CHLORIDE 0.9 % (FLUSH) 0.9 %
10 SYRINGE (ML) INJECTION AS NEEDED
Status: DISCONTINUED | OUTPATIENT
Start: 2020-01-01 | End: 2020-01-01 | Stop reason: HOSPADM

## 2020-01-01 RX ORDER — ONDANSETRON 2 MG/ML
INJECTION INTRAMUSCULAR; INTRAVENOUS AS NEEDED
Status: DISCONTINUED | OUTPATIENT
Start: 2020-01-01 | End: 2020-01-01 | Stop reason: SURG

## 2020-01-01 RX ORDER — MEMANTINE HYDROCHLORIDE 10 MG/1
10 TABLET ORAL 2 TIMES DAILY
Qty: 60 TABLET | Refills: 5 | Status: SHIPPED | OUTPATIENT
Start: 2020-01-01 | End: 2020-01-01

## 2020-01-01 RX ORDER — ONDANSETRON 2 MG/ML
4 INJECTION INTRAMUSCULAR; INTRAVENOUS ONCE AS NEEDED
Status: COMPLETED | OUTPATIENT
Start: 2020-01-01 | End: 2020-01-01

## 2020-01-01 RX ORDER — SODIUM CHLORIDE 0.9 % (FLUSH) 0.9 %
3 SYRINGE (ML) INJECTION EVERY 12 HOURS SCHEDULED
Status: DISCONTINUED | OUTPATIENT
Start: 2020-01-01 | End: 2020-01-01 | Stop reason: HOSPADM

## 2020-01-01 RX ORDER — SODIUM CHLORIDE, SODIUM LACTATE, POTASSIUM CHLORIDE, CALCIUM CHLORIDE 600; 310; 30; 20 MG/100ML; MG/100ML; MG/100ML; MG/100ML
1000 INJECTION, SOLUTION INTRAVENOUS CONTINUOUS
Status: DISCONTINUED | OUTPATIENT
Start: 2020-01-01 | End: 2020-01-01 | Stop reason: HOSPADM

## 2020-01-01 RX ORDER — DOBUTAMINE HYDROCHLORIDE 100 MG/100ML
10-50 INJECTION INTRAVENOUS CONTINUOUS
Status: DISCONTINUED | OUTPATIENT
Start: 2020-01-01 | End: 2020-01-01 | Stop reason: HOSPADM

## 2020-01-01 RX ORDER — MORPHINE SULFATE 2 MG/ML
1 INJECTION, SOLUTION INTRAMUSCULAR; INTRAVENOUS
Status: DISCONTINUED | OUTPATIENT
Start: 2020-01-01 | End: 2020-01-01 | Stop reason: HOSPADM

## 2020-01-01 RX ORDER — ALBUMIN, HUMAN INJ 5% 5 %
12.5 SOLUTION INTRAVENOUS ONCE
Status: COMPLETED | OUTPATIENT
Start: 2020-01-01 | End: 2020-01-01

## 2020-01-01 RX ORDER — NALOXONE HCL 0.4 MG/ML
0.4 VIAL (ML) INJECTION AS NEEDED
Status: DISCONTINUED | OUTPATIENT
Start: 2020-01-01 | End: 2020-01-01 | Stop reason: HOSPADM

## 2020-01-01 RX ORDER — PHENYLEPHRINE HCL IN 0.9% NACL 0.8MG/10ML
SYRINGE (ML) INTRAVENOUS AS NEEDED
Status: DISCONTINUED | OUTPATIENT
Start: 2020-01-01 | End: 2020-01-01 | Stop reason: SURG

## 2020-01-01 RX ORDER — VECURONIUM BROMIDE 1 MG/ML
INJECTION, POWDER, LYOPHILIZED, FOR SOLUTION INTRAVENOUS AS NEEDED
Status: DISCONTINUED | OUTPATIENT
Start: 2020-01-01 | End: 2020-01-01 | Stop reason: SURG

## 2020-01-01 RX ORDER — LORAZEPAM 0.5 MG/1
0.5 TABLET ORAL 2 TIMES DAILY
Qty: 60 TABLET | Refills: 0 | Status: SHIPPED | OUTPATIENT
Start: 2020-01-01 | End: 2020-01-01 | Stop reason: DRUGHIGH

## 2020-01-01 RX ORDER — LISINOPRIL 20 MG/1
20 TABLET ORAL DAILY
Status: DISCONTINUED | OUTPATIENT
Start: 2020-01-01 | End: 2020-01-01 | Stop reason: HOSPADM

## 2020-01-01 RX ORDER — FENTANYL CITRATE 50 UG/ML
INJECTION, SOLUTION INTRAMUSCULAR; INTRAVENOUS AS NEEDED
Status: DISCONTINUED | OUTPATIENT
Start: 2020-01-01 | End: 2020-01-01 | Stop reason: SURG

## 2020-01-01 RX ORDER — BUPIVACAINE HCL/0.9 % NACL/PF 0.1 %
2 PLASTIC BAG, INJECTION (ML) EPIDURAL EVERY 8 HOURS
Status: COMPLETED | OUTPATIENT
Start: 2020-01-01 | End: 2020-01-01

## 2020-01-01 RX ORDER — INDAPAMIDE 2.5 MG/1
2.5 TABLET, FILM COATED ORAL DAILY
Qty: 90 TABLET | Refills: 1 | Status: SHIPPED | OUTPATIENT
Start: 2020-01-01

## 2020-01-01 RX ORDER — HEPARIN SODIUM (PORCINE) LOCK FLUSH IV SOLN 100 UNIT/ML 100 UNIT/ML
500 SOLUTION INTRAVENOUS ONCE
Status: COMPLETED | OUTPATIENT
Start: 2020-01-01 | End: 2020-01-01

## 2020-01-01 RX ORDER — ETOMIDATE 2 MG/ML
INJECTION INTRAVENOUS AS NEEDED
Status: DISCONTINUED | OUTPATIENT
Start: 2020-01-01 | End: 2020-01-01 | Stop reason: SURG

## 2020-01-01 RX ORDER — LIDOCAINE HYDROCHLORIDE 20 MG/ML
INJECTION, SOLUTION INFILTRATION; PERINEURAL AS NEEDED
Status: DISCONTINUED | OUTPATIENT
Start: 2020-01-01 | End: 2020-01-01 | Stop reason: SURG

## 2020-01-01 RX ORDER — NICARDIPINE HYDROCHLORIDE 2.5 MG/ML
INJECTION INTRAVENOUS AS NEEDED
Status: DISCONTINUED | OUTPATIENT
Start: 2020-01-01 | End: 2020-01-01 | Stop reason: SURG

## 2020-01-01 RX ORDER — SODIUM CHLORIDE 0.9 % (FLUSH) 0.9 %
3 SYRINGE (ML) INJECTION AS NEEDED
Status: DISCONTINUED | OUTPATIENT
Start: 2020-01-01 | End: 2020-01-01 | Stop reason: HOSPADM

## 2020-01-01 RX ORDER — BUPIVACAINE HCL/0.9 % NACL/PF 0.1 %
2 PLASTIC BAG, INJECTION (ML) EPIDURAL ONCE
Status: CANCELLED | OUTPATIENT
Start: 2020-01-01 | End: 2020-01-01

## 2020-01-01 RX ORDER — LORAZEPAM 0.5 MG/1
0.5 TABLET ORAL 4 TIMES DAILY
Qty: 120 TABLET | Refills: 2 | Status: SHIPPED | OUTPATIENT
Start: 2020-01-01 | End: 2020-01-01

## 2020-01-01 RX ORDER — NEOSTIGMINE METHYLSULFATE 1 MG/ML
INJECTION, SOLUTION INTRAVENOUS AS NEEDED
Status: DISCONTINUED | OUTPATIENT
Start: 2020-01-01 | End: 2020-01-01 | Stop reason: SURG

## 2020-01-01 RX ORDER — BUPIVACAINE HYDROCHLORIDE 5 MG/ML
INJECTION, SOLUTION PERINEURAL AS NEEDED
Status: DISCONTINUED | OUTPATIENT
Start: 2020-01-01 | End: 2020-01-01 | Stop reason: HOSPADM

## 2020-01-01 RX ORDER — ACETAMINOPHEN 325 MG/1
650 TABLET ORAL EVERY 4 HOURS PRN
Status: DISCONTINUED | OUTPATIENT
Start: 2020-01-01 | End: 2020-01-01 | Stop reason: HOSPADM

## 2020-01-01 RX ORDER — ONDANSETRON 2 MG/ML
4 INJECTION INTRAMUSCULAR; INTRAVENOUS EVERY 6 HOURS PRN
Status: DISCONTINUED | OUTPATIENT
Start: 2020-01-01 | End: 2020-01-01 | Stop reason: HOSPADM

## 2020-01-01 RX ORDER — MORPHINE SULFATE 100 MG/5ML
10 SOLUTION ORAL
Qty: 30 ML | Refills: 0 | Status: SHIPPED | OUTPATIENT
Start: 2020-01-01

## 2020-01-01 RX ORDER — FUROSEMIDE 40 MG/1
40 TABLET ORAL DAILY
Qty: 10 TABLET | Refills: 0 | Status: SHIPPED | OUTPATIENT
Start: 2020-01-01 | End: 2020-01-01 | Stop reason: SDUPTHER

## 2020-01-01 RX ORDER — LORAZEPAM 1 MG/1
1 TABLET ORAL EVERY 8 HOURS PRN
Qty: 90 TABLET | Refills: 0 | Status: SHIPPED | OUTPATIENT
Start: 2020-01-01

## 2020-01-01 RX ORDER — NALOXONE HCL 0.4 MG/ML
0.4 VIAL (ML) INJECTION
Status: DISCONTINUED | OUTPATIENT
Start: 2020-01-01 | End: 2020-01-01 | Stop reason: HOSPADM

## 2020-01-01 RX ORDER — LIDOCAINE HYDROCHLORIDE 10 MG/ML
0.5 INJECTION, SOLUTION EPIDURAL; INFILTRATION; INTRACAUDAL; PERINEURAL ONCE AS NEEDED
Status: DISCONTINUED | OUTPATIENT
Start: 2020-01-01 | End: 2020-01-01 | Stop reason: HOSPADM

## 2020-01-01 RX ORDER — CIPROFLOXACIN 500 MG/1
500 TABLET, FILM COATED ORAL 2 TIMES DAILY
Qty: 14 TABLET | Refills: 0 | Status: SHIPPED | OUTPATIENT
Start: 2020-01-01 | End: 2020-01-01

## 2020-01-01 RX ORDER — FLUMAZENIL 0.1 MG/ML
0.2 INJECTION INTRAVENOUS AS NEEDED
Status: DISCONTINUED | OUTPATIENT
Start: 2020-01-01 | End: 2020-01-01 | Stop reason: HOSPADM

## 2020-01-01 RX ORDER — INDAPAMIDE 2.5 MG/1
2.5 TABLET, FILM COATED ORAL DAILY
Status: DISCONTINUED | OUTPATIENT
Start: 2020-01-01 | End: 2020-01-01 | Stop reason: HOSPADM

## 2020-01-01 RX ORDER — LORAZEPAM 0.5 MG/1
0.5 TABLET ORAL 3 TIMES DAILY
Qty: 90 TABLET | Refills: 0 | Status: SHIPPED | OUTPATIENT
Start: 2020-01-01

## 2020-01-01 RX ORDER — CEPHALEXIN 500 MG/1
500 CAPSULE ORAL 3 TIMES DAILY
Qty: 21 CAPSULE | Refills: 0 | Status: SHIPPED | OUTPATIENT
Start: 2020-01-01 | End: 2020-01-01

## 2020-01-01 RX ORDER — SODIUM CHLORIDE 9 MG/ML
300 INJECTION, SOLUTION INTRAVENOUS ONCE
Status: COMPLETED | OUTPATIENT
Start: 2020-01-01 | End: 2020-01-01

## 2020-01-01 RX ORDER — CLOPIDOGREL BISULFATE 75 MG/1
75 TABLET ORAL DAILY
Qty: 90 TABLET | Refills: 0 | Status: SHIPPED | OUTPATIENT
Start: 2020-01-01 | End: 2020-01-01

## 2020-01-01 RX ORDER — FUROSEMIDE 40 MG/1
40 TABLET ORAL DAILY
Qty: 30 TABLET | Refills: 0 | Status: ON HOLD | OUTPATIENT
Start: 2020-01-01 | End: 2020-01-01

## 2020-01-01 RX ORDER — CLOPIDOGREL BISULFATE 75 MG/1
75 TABLET ORAL EVERY EVENING
Status: DISCONTINUED | OUTPATIENT
Start: 2020-01-01 | End: 2020-01-01 | Stop reason: HOSPADM

## 2020-01-01 RX ORDER — ONDANSETRON 4 MG/1
4 TABLET, FILM COATED ORAL EVERY 6 HOURS PRN
Status: DISCONTINUED | OUTPATIENT
Start: 2020-01-01 | End: 2020-01-01 | Stop reason: HOSPADM

## 2020-01-01 RX ORDER — FENTANYL CITRATE 50 UG/ML
25 INJECTION, SOLUTION INTRAMUSCULAR; INTRAVENOUS
Status: DISCONTINUED | OUTPATIENT
Start: 2020-01-01 | End: 2020-01-01 | Stop reason: HOSPADM

## 2020-01-01 RX ORDER — SODIUM CHLORIDE 9 MG/ML
75 INJECTION, SOLUTION INTRAVENOUS CONTINUOUS
Status: DISCONTINUED | OUTPATIENT
Start: 2020-01-01 | End: 2020-01-01 | Stop reason: HOSPADM

## 2020-01-01 RX ORDER — HEPARIN SODIUM 1000 [USP'U]/ML
INJECTION, SOLUTION INTRAVENOUS; SUBCUTANEOUS AS NEEDED
Status: DISCONTINUED | OUTPATIENT
Start: 2020-01-01 | End: 2020-01-01 | Stop reason: SURG

## 2020-01-01 RX ORDER — SODIUM CHLORIDE 0.9 % (FLUSH) 0.9 %
3-10 SYRINGE (ML) INJECTION AS NEEDED
Status: DISCONTINUED | OUTPATIENT
Start: 2020-01-01 | End: 2020-01-01 | Stop reason: HOSPADM

## 2020-01-01 RX ORDER — LABETALOL HYDROCHLORIDE 5 MG/ML
5 INJECTION, SOLUTION INTRAVENOUS
Status: DISCONTINUED | OUTPATIENT
Start: 2020-01-01 | End: 2020-01-01 | Stop reason: HOSPADM

## 2020-01-01 RX ORDER — LABETALOL HYDROCHLORIDE 5 MG/ML
20 INJECTION, SOLUTION INTRAVENOUS
Status: DISCONTINUED | OUTPATIENT
Start: 2020-01-01 | End: 2020-01-01 | Stop reason: HOSPADM

## 2020-01-01 RX ORDER — LORAZEPAM 0.5 MG/1
0.5 TABLET ORAL 4 TIMES DAILY
Qty: 120 TABLET | Refills: 2 | Status: CANCELLED | OUTPATIENT
Start: 2020-01-01

## 2020-01-01 RX ORDER — CLOPIDOGREL BISULFATE 75 MG/1
75 TABLET ORAL DAILY
Qty: 90 TABLET | Refills: 0 | Status: SHIPPED | OUTPATIENT
Start: 2020-01-01

## 2020-01-01 RX ORDER — ATORVASTATIN CALCIUM 10 MG/1
TABLET, FILM COATED ORAL
Qty: 90 TABLET | Refills: 1 | Status: SHIPPED | OUTPATIENT
Start: 2020-01-01

## 2020-01-01 RX ORDER — LORAZEPAM 0.5 MG/1
TABLET ORAL
Qty: 30 TABLET | Refills: 0 | Status: SHIPPED | OUTPATIENT
Start: 2020-01-01 | End: 2020-01-01 | Stop reason: SDUPTHER

## 2020-01-01 RX ORDER — HYDROCODONE BITARTRATE AND ACETAMINOPHEN 5; 325 MG/1; MG/1
1 TABLET ORAL EVERY 4 HOURS PRN
Status: DISCONTINUED | OUTPATIENT
Start: 2020-01-01 | End: 2020-01-01 | Stop reason: HOSPADM

## 2020-01-01 RX ORDER — LIDOCAINE HYDROCHLORIDE 10 MG/ML
INJECTION, SOLUTION INFILTRATION; PERINEURAL AS NEEDED
Status: DISCONTINUED | OUTPATIENT
Start: 2020-01-01 | End: 2020-01-01 | Stop reason: HOSPADM

## 2020-01-01 RX ORDER — DONEPEZIL HYDROCHLORIDE 10 MG/1
10 TABLET, FILM COATED ORAL NIGHTLY
Status: DISCONTINUED | OUTPATIENT
Start: 2020-01-01 | End: 2020-01-01 | Stop reason: HOSPADM

## 2020-01-01 RX ORDER — LORAZEPAM 0.5 MG/1
0.5 TABLET ORAL EVERY 8 HOURS PRN
Qty: 90 TABLET | Refills: 2 | Status: SHIPPED | OUTPATIENT
Start: 2020-01-01 | End: 2020-01-01 | Stop reason: SDUPTHER

## 2020-01-01 RX ORDER — CYANOCOBALAMIN 1000 UG/ML
1000 INJECTION, SOLUTION INTRAMUSCULAR; SUBCUTANEOUS
Status: DISCONTINUED | OUTPATIENT
Start: 2020-01-01 | End: 2020-01-01 | Stop reason: HOSPADM

## 2020-01-01 RX ORDER — ASPIRIN 81 MG/1
81 TABLET ORAL DAILY
Status: DISCONTINUED | OUTPATIENT
Start: 2020-01-01 | End: 2020-01-01 | Stop reason: HOSPADM

## 2020-01-01 RX ADMIN — CLOPIDOGREL 75 MG: 75 TABLET, FILM COATED ORAL at 19:38

## 2020-01-01 RX ADMIN — ONDANSETRON HYDROCHLORIDE 4 MG: 2 SOLUTION INTRAMUSCULAR; INTRAVENOUS at 12:05

## 2020-01-01 RX ADMIN — LIDOCAINE HYDROCHLORIDE 60 MG: 20 INJECTION, SOLUTION INFILTRATION; PERINEURAL at 12:09

## 2020-01-01 RX ADMIN — ASPIRIN 81 MG: 81 TABLET ORAL at 19:38

## 2020-01-01 RX ADMIN — SODIUM CHLORIDE 75 ML/HR: 9 INJECTION, SOLUTION INTRAVENOUS at 02:05

## 2020-01-01 RX ADMIN — ALBUMIN, HUMAN INJ 5% 12.5 G: 5 SOLUTION at 13:30

## 2020-01-01 RX ADMIN — SODIUM CHLORIDE 300 ML: 9 INJECTION, SOLUTION INTRAVENOUS at 11:49

## 2020-01-01 RX ADMIN — HYDROCODONE BITARTRATE AND ACETAMINOPHEN 1 TABLET: 5; 325 TABLET ORAL at 02:01

## 2020-01-01 RX ADMIN — ONDANSETRON HYDROCHLORIDE 4 MG: 2 SOLUTION INTRAMUSCULAR; INTRAVENOUS at 13:01

## 2020-01-01 RX ADMIN — GLYCOPYRROLATE 0.2 MG: 0.2 INJECTION, SOLUTION INTRAMUSCULAR; INTRAVENOUS at 10:57

## 2020-01-01 RX ADMIN — NICARDIPINE HYDROCHLORIDE 500 MCG: 25 INJECTION INTRAVENOUS at 12:01

## 2020-01-01 RX ADMIN — PERFLUTREN 8.48 MG: 6.52 INJECTION, SUSPENSION INTRAVENOUS at 11:48

## 2020-01-01 RX ADMIN — SODIUM CHLORIDE, POTASSIUM CHLORIDE, SODIUM LACTATE AND CALCIUM CHLORIDE: 600; 310; 30; 20 INJECTION, SOLUTION INTRAVENOUS at 12:13

## 2020-01-01 RX ADMIN — Medication 50 MCG: at 11:04

## 2020-01-01 RX ADMIN — FENTANYL CITRATE 250 MCG: 50 INJECTION INTRAMUSCULAR; INTRAVENOUS at 10:45

## 2020-01-01 RX ADMIN — VECURONIUM BROMIDE 10 MG: 1 INJECTION, POWDER, LYOPHILIZED, FOR SOLUTION INTRAVENOUS at 10:45

## 2020-01-01 RX ADMIN — GLYCOPYRROLATE 0.2 MG: 0.2 INJECTION, SOLUTION INTRAMUSCULAR; INTRAVENOUS at 10:55

## 2020-01-01 RX ADMIN — HEPARIN SODIUM 7000 UNITS: 1000 INJECTION, SOLUTION INTRAVENOUS; SUBCUTANEOUS at 11:24

## 2020-01-01 RX ADMIN — FERROUS SULFATE TAB 325 MG (65 MG ELEMENTAL FE) 325 MG: 325 (65 FE) TAB at 09:42

## 2020-01-01 RX ADMIN — Medication 500 UNITS: at 12:25

## 2020-01-01 RX ADMIN — SODIUM CHLORIDE, POTASSIUM CHLORIDE, SODIUM LACTATE AND CALCIUM CHLORIDE: 600; 310; 30; 20 INJECTION, SOLUTION INTRAVENOUS at 10:39

## 2020-01-01 RX ADMIN — SODIUM CHLORIDE 75 ML/HR: 9 INJECTION, SOLUTION INTRAVENOUS at 16:32

## 2020-01-01 RX ADMIN — SODIUM CHLORIDE 2 G: 9 INJECTION, SOLUTION INTRAVENOUS at 02:01

## 2020-01-01 RX ADMIN — IOPAMIDOL 100 ML: 755 INJECTION, SOLUTION INTRAVENOUS at 14:19

## 2020-01-01 RX ADMIN — SODIUM CHLORIDE 2 G: 9 INJECTION, SOLUTION INTRAVENOUS at 19:38

## 2020-01-01 RX ADMIN — ETOMIDATE 20 MG: 2 INJECTION, SOLUTION INTRAVENOUS at 10:45

## 2020-01-01 RX ADMIN — ASPIRIN 81 MG: 81 TABLET ORAL at 09:42

## 2020-01-01 RX ADMIN — Medication 2 G: at 10:55

## 2020-01-01 RX ADMIN — CYANOCOBALAMIN 1000 MCG: 1000 INJECTION, SOLUTION INTRAMUSCULAR; SUBCUTANEOUS at 12:59

## 2020-01-01 RX ADMIN — PHENYLEPHRINE HYDROCHLORIDE 0.1 MCG/KG/MIN: 10 INJECTION INTRAVENOUS at 11:27

## 2020-01-01 RX ADMIN — Medication 100 MCG: at 10:56

## 2020-01-01 RX ADMIN — ATORVASTATIN CALCIUM 10 MG: 10 TABLET, FILM COATED ORAL at 21:29

## 2020-01-01 RX ADMIN — SODIUM CHLORIDE, POTASSIUM CHLORIDE, SODIUM LACTATE AND CALCIUM CHLORIDE 1000 ML: 600; 310; 30; 20 INJECTION, SOLUTION INTRAVENOUS at 07:33

## 2020-01-01 RX ADMIN — ALBUMIN HUMAN 12.5 G: 0.05 INJECTION, SOLUTION INTRAVENOUS at 13:30

## 2020-01-01 RX ADMIN — LIDOCAINE HYDROCHLORIDE 100 MG: 20 INJECTION, SOLUTION INFILTRATION; PERINEURAL at 10:45

## 2020-01-01 RX ADMIN — SODIUM CHLORIDE, PRESERVATIVE FREE 3 ML: 5 INJECTION INTRAVENOUS at 21:30

## 2020-01-01 RX ADMIN — Medication 10 MCG/KG/MIN: at 11:49

## 2020-01-01 RX ADMIN — NEOSTIGMINE METHYLSULFATE 3 MG: 1 INJECTION INTRAVENOUS at 12:10

## 2020-01-01 RX ADMIN — GLYCOPYRROLATE 0.4 MG: 0.2 INJECTION, SOLUTION INTRAMUSCULAR; INTRAVENOUS at 12:10

## 2020-01-01 RX ADMIN — Medication 100 MCG: at 10:54

## 2020-01-01 RX ADMIN — DONEPEZIL HYDROCHLORIDE 10 MG: 10 TABLET, FILM COATED ORAL at 21:29

## 2020-01-20 NOTE — PROGRESS NOTES
Administered 1,000 mcg of Cyanocobalamin into right deltoid. Patient tolerated well, observed no reactions.

## 2020-01-22 PROBLEM — Z93.3 COLOSTOMY STATUS (HCC): Status: ACTIVE | Noted: 2020-01-01

## 2020-01-22 NOTE — ED PROVIDER NOTES
Subjective   This 79-year-old male patient presents the emergency room with a complaint of increased swelling and tenderness to his left leg which apparently has been gradually worsening since [place].  Earlier than that they had stents put in both femoral arteries for circulatory problems.  They called the vascular surgeon and a who told him they can be seen in February and they finally called her PCP who told him to come the ED.  The patient denies fever chills chest pain shortness of breath nausea or vomiting.  He is only complaining a lot of pain to the leg but it is past-year-old very swollen.          Review of Systems   Constitutional: Negative for activity change, appetite change, chills and fever.   HENT: Negative.    Respiratory: Negative for chest tightness and shortness of breath.    Cardiovascular: Negative for chest pain.   Gastrointestinal:        Patient has a colostomy in the left lower quadrant from prior surgery for colon cancer.  This is a permanent thing and will not be reversed apparently.  He has noticed sometimes just a little bit of tenderness since and bulging around the base of the colostomy.  Currently he does not have it at this time.  Apparently he has normal output from the colostomy bag.   Genitourinary: Negative.    Musculoskeletal:        Patient's left leg is significantly swollen compared to the right from the mid thigh down including the calf to the foot.   Neurological: Negative.    Psychiatric/Behavioral: Negative.        Past Medical History:   Diagnosis Date   • Colon cancer (CMS/HCC)    • Dementia (CMS/HCC)    • Hyperlipidemia    • Hypertension    • Peripheral vascular disease (CMS/HCC)    • Prostate cancer (CMS/HCC)        No Known Allergies    Past Surgical History:   Procedure Laterality Date   • COLECTOMY PARTIAL / TOTAL     • COLONOSCOPY  08/02/2016   • FEMORAL ENDARTERECTOMY Bilateral 8/12/2019    Procedure: LEFT  FEMORAL ENDARTERECTOMY, THROMBECTOMY OF LEFT ILIAC  ARTERY, BALLOON ANGIOPLASTY, STENT PLACEMENT;  Surgeon: Michael Colin DO;  Location: [facility] HYBRID OR 12;  Service: Vascular   • PROSTATECTOMY     • TOTAL HIP ARTHROPLASTY Bilateral    • VASCULAR SURGERY         Family History   Problem Relation Age of Onset   • Heart disease Mother    • Stroke Mother    • Dementia Mother    • Prostate cancer Father    • Breast cancer Sister    • Brain cancer Sister    • Lung cancer Sister    • No Known Problems Daughter    • Hypertension Son    • No Known Problems Maternal Grandmother    • No Known Problems Maternal Grandfather    • No Known Problems Paternal Grandmother    • No Known Problems Paternal Grandfather        Social History     Socioeconomic History   • Marital status:      Spouse name: Not on file   • Number of children: Not on file   • Years of education: Not on file   • Highest education level: Not on file   Tobacco Use   • Smoking status: Former Smoker     Packs/day: 1.00     Years: 50.00     Pack years: 50.00     Types: Cigarettes     Last attempt to quit: 2007     Years since quittin.9   • Smokeless tobacco: Never Used   Substance and Sexual Activity   • Alcohol use: Yes     Comment: occ   • Drug use: No   • Sexual activity: Defer           Objective   Physical Exam   Constitutional: He is oriented to person, place, and time. He appears well-developed and well-nourished.   HENT:   Head: Normocephalic and atraumatic.   Eyes: Pupils are equal, round, and reactive to light. EOM are normal.   Neck: Normal range of motion. Neck supple.   Cardiovascular: Normal rate, regular rhythm and normal heart sounds.   Pulmonary/Chest: Effort normal and breath sounds normal.   Abdominal: Soft. Bowel sounds are normal. There is no tenderness.   Patient has a functional colostomy in the left lower quadrant.  There is no significant tenderness or findings in the region of the colostomy it is draining normal stool.   Musculoskeletal:   Patient's left lower  extremity he is not significant edema from about the mid thigh down to the foot.  The foot is warm and has good dorsalis pedis pulses.  He does have calf tenderness.  Right leg is essentially normal although it has a little bit of chronic edema.  He has normal pulses.   Neurological: He is oriented to person, place, and time.   Skin:   See above   Psychiatric: He has a normal mood and affect.   Nursing note and vitals reviewed.      Procedures           ED Course                                               MDM  Number of Diagnoses or Management Options  Leg edema, left:   Diagnosis management comments: Patient's Doppler does not show evidence of a DVT.  I discussed his lab findings and Doppler results with the patient and his wife.  It does not appear that he needs to be on blood thinners at this time.  Does not appear to be a case of cellulitis either.  There is no redness or signs of signs of infection.  We will go ahead and start him on some furosemide for the next week or so and have him follow-up with his PCP.      Final diagnoses:   Leg edema, left            Rafi Vergara DO  01/22/20 [id number]

## 2020-01-22 NOTE — DISCHARGE INSTRUCTIONS
I recommend you start the Lasix tomorrow morning so that he does not have problems with having up at night to urinate.  Given you enough for a week.  Be sure you see your primary care doctor if he needs to be on it for a longer period of time.

## 2020-02-03 NOTE — TELEPHONE ENCOUNTER
LEG STILL SWOLLEN. NO BLOOD CLOT WAS FOUND-FOUND GOOD PULSE.  DOES HE NEED TO CONTINUE WITH FUROSEMIDE 40MG (1) QD?  HE ONLY HAD 10 TABLETS.  PLEASE ADVISE WHAT PT NEEDS TO DO?    DO YOU NEED TO SEE IN OFFICE?    LIL MORE WALKING, WEARING COMPRESSION & ELEVATING LEG DURING NIGHT      [facility]

## 2020-02-03 NOTE — TELEPHONE ENCOUNTER
Give him a time for me to see them sometime in the afternoon this week before the Lasix runs out-start the Lasix back today

## 2020-02-26 NOTE — PROGRESS NOTES
2/26/2020        Ben Mae MD  [address] [facility] [address]        Antony Duenas  1940    Chief Complaint   Patient presents with   • Follow-up     6 Month Follow Up For Bilateral Carotid Artery Stenosis and Peripheral Artery Disease. Test [id number] US pad ankle / brach ind ext comp and US pad carotid bilateral. Patient denies any stroke like symptoms.    • other     Patient states Left Leg is severly swollen and the right leg is somewhat swollen. Patient states bilateral feet numb feeling as well as pain. Patient states left leg from knee down is very painful.        Dear Ben Mae MD:    HPI     I had the pleasure of seeing you patient in the office today for follow up.  As you recall, the patient is a 79 y.o. male who we are following for lower extremity PAD.   He did have previous vascular surgery at [place] in 2014.  At that time he did have left iliac stent placed.  He did undergo a left common femoral endarterectomy with bovine patch angioplasty and eversion profundoplasty, thrombectomy of the left common iliac and external iliac, balloon angioplasty of the right external iliac with stenting on 8/12/2019 and is now back for follow-up.  He does not ambulate much.  He has developed significant swelling to his lower extremities, left greater than right.  Who was recently seen in the emergency department and venous duplex was negative for DVT.  He was given Lasix, which they report does not change his leg swelling.  This was also refilled by his primary care provider.  They state they have been unable to get compression stockings on.  He is maintained on aspirin, Plavix, and Lipitor.  He did have noninvasive testing performed today, which I did review in office.      Past Medical History:   Diagnosis Date   • Colon cancer (CMS/HCC)    • Dementia (CMS/HCC)    • Hyperlipidemia    • Hypertension    • Peripheral vascular disease (CMS/HCC)    • Prostate cancer (CMS/HCC)   "        Review of Systems   HENT: Negative.    Eyes: Negative.    Respiratory: Negative.    Cardiovascular: Positive for leg swelling (Left greater than right.).   Gastrointestinal: Negative.    Endocrine: Negative.    Genitourinary: Negative.    Musculoskeletal: Negative.    Skin: Negative.    Allergic/Immunologic: Negative.    Neurological: Positive for dizziness and weakness.   Hematological: Negative.    Psychiatric/Behavioral: Negative.    All other systems reviewed and are negative.      /74 (BP Location: Left arm, Patient Position: Sitting, Cuff Size: Adult)   Ht 188 cm (74\")   Wt 86.2 kg (190 lb)   BMI 24.39 kg/m²   Physical Exam   Constitutional: He is oriented to person, place, and time. He appears well-developed and well-nourished. No distress.   HENT:   Head: Normocephalic and atraumatic.   Mouth/Throat: Oropharynx is clear and moist and mucous membranes are normal.   Neck: No JVD present. Carotid bruit is not present.   Cardiovascular: Normal rate, regular rhythm, S1 normal, S2 normal, normal heart sounds and normal pulses. Exam reveals no gallop and no friction rub.   No murmur heard.  Pulses:       Femoral pulses are 2+ on the right side, and 2+ on the left side.       Popliteal pulses are 2+ on the right side, and 2+ on the left side.   Doppler signals bilaterally, significant leg swelling with hyperpigmentation.   Pulmonary/Chest: Effort normal and breath sounds normal.   Abdominal: Soft. Normal appearance, normal aorta and bowel sounds are normal. He exhibits no abdominal bruit. There is no hepatosplenomegaly. There is no tenderness.   Musculoskeletal: Normal range of motion.     Vascular Status -  His right foot exhibits no edema. His left foot exhibits no edema.  Neurological: He is alert and oriented to person, place, and time. He has normal strength. No cranial nerve deficit.   Skin: Skin is warm, dry and intact. He is not diaphoretic.   Psychiatric: He has a normal mood and affect. " His behavior is normal. Judgment and thought content normal. Cognition and memory are normal.       DIAGNOSTIC DATA:  Noninvasive testing performed today including ABIs shows right MICHAEL 0.96 and left MICHAEL of 0.97.  Carotid duplex shows 50 to 69% carotid stenosis on the right and less than 50% carotid stenosis on the left with bilateral antegrade vertebral flow.  There is heavy plaque burden noted greater on the right than left.    Venous Duplex LLE  History: Left lower extremity pain, swelling     IMPRESSION:  Impression: There is no evidence of deep venous thrombosis or  superficial thrombophlebitis of the left lower extremity.     Comments: Left lower extremity venous duplex exam was performed using  color Doppler flow, Doppler wave form analysis, and grayscale imaging,  with and without compression. There is no evidence of deep venous  thrombosis of the common femoral, superficial femoral, popliteal,  posterior tibial, and peroneal veins. There is no thrombus identified in  the saphenofemoral junction or the greater saphenous vein.     This report was finalized on 01/23/2020 14:23 by Dr. Cristiano Santos MD.    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 Bilateral carotid artery stenosis I65.23 433.10     433.30   2. PAD (peripheral artery disease) (CMS/[facility]) I73.9 443.9   3. Hyperlipidemia, unspecified hyperlipidemia type E78.5 272.4   4. Hypertension, unspecified type I10 401.9   5. Lymphedema I89.0 457.1         PLAN: After thoroughly evaluating Antony Duenas, I believe the best course of action is to proceed with further imaging including a CTA of the neck for further evaluation of his carotid disease.  I did review his testing and ABIs  were normal.  He did have heavy plaque noted in the right carotid on some to the left as well but not as extensive.  Currently his main issue seems to be significant leg swelling left greater than right.  He was given diuretics however this is not helped with the swelling.  They have been unable to wear compression stockings due to significant swelling.  His recent venous duplex was negative.  I did apply his compression socks with leg Woods we have in the office and family will get 1 of these to aid in stocking application.  I also think he would be a great candidate for home lymphedema pumps and will arrange meeting with our tactile .  We will see him back in the next 2 weeks to review his CTA of the neck to see if any further needs to be done regarding his carotid disease.   I did discuss vascular risk factors as they pertain to the progression of vascular disease including controlling his hypertension and hyperlipidemia, are stable.  The patient is to continue taking their medications as previously discussed.   This was all discussed in full with complete understanding.  Thank you for allowing me to participate in the care of your patient.  Please do not hesitate to call with any questions or concerns.  We will keep you aware of any further encounters with Antony Duenas.      Sincerely Yours,      TAB Perez

## 2020-02-28 NOTE — TELEPHONE ENCOUNTER
Left message letting Mr Duenas know I had him scheduled for Thursday, March 5th, 2020 to arrive at the Main Entrance at 130 pm for testing with nothing to eat or drink 6 hours prior and follow up afterwards at 230 with Dr Colin. Then he would see Carmenza around 3 after his appointment with Dr Colin. I advised Mr Duenas if he had any questions or needed to reschedule to please call the office at [phone].    Also mailed reminder letter

## 2020-03-04 NOTE — TELEPHONE ENCOUNTER
Left msg for pt about appt at 230 on 3/5 and testing at main at 130 and informed pt npo 6 hours prior to testing.

## 2020-03-05 PROBLEM — I65.23 BILATERAL CAROTID ARTERY STENOSIS: Status: ACTIVE | Noted: 2020-01-01

## 2020-03-05 PROBLEM — I89.0 LYMPHEDEMA OF LEFT LEG: Status: ACTIVE | Noted: 2020-01-01

## 2020-03-05 NOTE — PROGRESS NOTES
"3/5/2020        Ben Mae MD  [address] [facility] [address]        Antony Duenas  1940    Chief Complaint   Patient presents with   • Follow-up     1 wk f/u with CTA of the neck.        Dear Ben Mae MD:    HPI     I had the pleasure of seeing you patient in the office today for follow up.  As you recall, the patient is a 79 y.o. male who we are following for lower extremity PAD.   He did have previous vascular surgery at [place] in 2014.  At that time he did have left iliac stent placed.  He did undergo a left common femoral endarterectomy with bovine patch angioplasty and eversion profundoplasty, thrombectomy of the left common iliac and external iliac, balloon angioplasty of the right external iliac with stenting on 8/12/2019 and is now back for follow-up.  He has developed significant swelling to his lower extremities, left greater than right.  He was recently seen in the emergency department and venous duplex was negative for DVT.  He was given Lasix, which they report does not change his leg swelling.  He was seen last week and I did help him get compression stockings on.  His swelling has increased. he is maintained on aspirin, Plavix, and Lipitor.  He denies any current strokelike symptoms.  He did have noninvasive testing performed today, which I did review in office.      Review of Systems   HENT: Negative.    Eyes: Negative.    Respiratory: Negative.    Cardiovascular: Positive for leg swelling (Left greater than right.).   Gastrointestinal: Negative.    Endocrine: Negative.    Genitourinary: Negative.    Musculoskeletal: Negative.    Skin: Negative.    Allergic/Immunologic: Negative.    Neurological: Positive for dizziness and weakness.   Hematological: Negative.    Psychiatric/Behavioral: Negative.    All other systems reviewed and are negative.      /64   Pulse 61   Ht 188 cm (74\")   Wt 86.2 kg (190 lb)   SpO2 98%   BMI 24.39 kg/m²   Physical " Exam   Constitutional: He is oriented to person, place, and time. He appears well-developed and well-nourished. No distress.   HENT:   Head: Normocephalic and atraumatic.   Mouth/Throat: Oropharynx is clear and moist and mucous membranes are normal.   Neck: No JVD present. Carotid bruit is not present.   Cardiovascular: Normal rate, regular rhythm, S1 normal, S2 normal, normal heart sounds and normal pulses. Exam reveals no gallop and no friction rub.   No murmur heard.  Pulses:       Femoral pulses are 2+ on the right side, and 2+ on the left side.       Popliteal pulses are 2+ on the right side, and 2+ on the left side.   Doppler signals bilaterally, significant leg swelling with hyperpigmentation.   Pulmonary/Chest: Effort normal and breath sounds normal.   Abdominal: Soft. Normal appearance, normal aorta and bowel sounds are normal. He exhibits no abdominal bruit. There is no hepatosplenomegaly. There is no tenderness.   Musculoskeletal: Normal range of motion.     Vascular Status -  His right foot exhibits no edema. His left foot exhibits no edema.  Neurological: He is alert and oriented to person, place, and time. He has normal strength. No cranial nerve deficit.   Skin: Skin is warm, dry and intact. He is not diaphoretic.   Psychiatric: He has a normal mood and affect. His behavior is normal. Judgment and thought content normal. Cognition and memory are normal.       DIAGNOSTIC DATA:  Ct Angiogram Neck    Result Date: 3/5/2020  Narrative: EXAMINATION: CT ANGIOGRAM NECK- 3/5/2020 2:25 PM CST  HISTORY: Bilateral carotid artery stenosis  COMPARISON: Carotid ultrasound 2/26/2020  DLP: 460 mGy cm  TECHNIQUE: Following the uneventful administration of Isovue contrast, serial helical tomographic images of the neck were obtained following angiogram protocol. Multiplanar MIP and 3D reconstructions were provided for review.   FINDINGS:  Angiogram: There is a left-sided aortic arch which gives rise to 3 great vessels.  Atherosclerotic calcifications and soft plaque are noted within the aorta and its branch vessels.  The origin of the left common carotid artery is unremarkable. A few scattered areas of calcified plaque and intimal thickening are evident in the left common carotid artery. Calcified plaque is noted at the carotid bifurcation. This results in approximately 40% stenosis of the proximal left internal carotid artery. Calcified plaque is also noted within the cavernous portion of the left internal carotid artery resulting in approximately 65% stenosis. The left external carotid artery and its proximal branch vessels enhance normally.  The origin of the right common carotid artery is unremarkable. Minimal calcified and noncalcified plaque is noted in the right common carotid artery. At the carotid bifurcation and the proximal right internal carotid artery, calcified plaque results in approximately 55% stenosis. Calcified plaque is also noted in the right cavernous carotid artery without hemodynamically significant stenosis or vessel cut off. The right external carotid artery and its proximal branch vessels enhance normally.  The origin of the left vertebral artery is unremarkable. Within the proximal left vertebral artery, there is an area of narrowing of approximately 50%. More distally, an area of narrowing is also identified measuring less than 50%. At the origin of the left posterior and appears cerebellar artery, the left vertebral artery appears occluded. There is reconstitution just proximal to the basilar artery. This segment of nonenhancement measures approximately 2 cm in length. The left posterior inferior cerebellar artery enhances normally.  The origin of the right vertebral artery is unremarkable. There is calcified plaque within the distal right vertebral artery without evidence of vessel cut off. This results in approximately 50% stenosis. The visualized basilar artery is unremarkable. The superior  cerebellar arteries arise normally. In the basilar artery terminates into the posterior cerebral arteries.  Other findings: There is thinning of the lenses bilaterally, suggesting cataracts. No cervical lymphadenopathy is identified. The thyroid gland is heterogeneous in appearance. There is a mucus retention cyst versus polyp in the inferior right maxillary sinus. There is bilateral maxillary sinus mucosal thickening. The mastoid air cells appear clear. Tiny cystic changes in the lung apices may reflect of the [place]. Degenerative changes are seen in the cervical spine, most pronounced at C5-C6.      Impression: Impression: 1. Calcified plaque the origin of the right internal carotid artery results in approximately 55% stenosis. 2. 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 3. 40% stenosis at the origin of the left internal carotid artery. 65% stenosis of the cavernous left internal carotid artery.  This report was finalized on 03/05/2020 14:46 by Dr. Damian Marie MD.    Us Carotid Bilateral    Result Date: 2/26/2020  Narrative: History: Carotid occlusive disease      Impression: Impression: 1. There is 50-69% stenosis of the right internal carotid artery. 2. There is less than 50% stenosis of the left internal carotid artery. 3. Antegrade flow is demonstrated in bilateral vertebral arteries.  Comments: Bilateral carotid vertebral arterial duplex scan was performed.  Grayscale imaging shows intimal thickening and calcified elements at the carotid bifurcation. The right internal carotid artery peak systolic velocity is 233 cm/sec. The end-diastolic velocity is 34.0 cm/sec. The right ICA/CCA ratio is approximately 2.76 . These findings correlate with 50-69% stenosis of the right internal carotid artery.  Grayscale imaging shows intimal thickening and calcified elements at the carotid bifurcation. The left internal carotid artery peak systolic velocity is 109 cm/sec. The end-diastolic velocity is 21.2 cm/sec. The  left ICA/CCA ratio is approximately 1.21 . These findings correlate with less than 50% stenosis of the left internal carotid artery.  Antegrade flow is demonstrated in bilateral vertebral arteries. Greater than 50% stenosis in the proximal right external carotid artery. This report was finalized on 02/26/2020 17:42 by Dr. Cristiano Santos MD.    Us Ankle / Brachial Indices Extremity Complete    Result Date: 2/26/2020  Narrative:  History: PAD  Comments: Bilateral lower extremity arterial with multi-level pulse volume recordings and segmental pressures were performed at rest and stress.  The right ankle/brachial index is 0.96. Doppler waveforms are biphasic and PVR waveform at the ankle is mildly dampened.These findings are consistent with no significant arterial insufficiency of the right lower extremity at rest.  The left ankle/brachial index is 0.97. Doppler waveforms are biphasic and PVR waveform at the ankle is mildly dampened. These findings are consistent with no significant arterial insufficiency of the left lower extremity at rest.      Impression: Impression: 1. No significant arterial insufficiency of the right lower extremity at rest. 2. No significant arterial insufficiency of the left lower extremity at rest.   This report was finalized on 02/26/2020 17:43 by Dr. Cristiano Santos MD.       Patient Active Problem List   Diagnosis   • Cholelithiasis   • Prostate cancer (CMS/HCC)   • Iliac artery occlusion (CMS/HCC)   • Hyperlipidemia   • History of colon cancer   • Anemia   • Hypertension   • Arthritis   • PVD (peripheral vascular disease) (CMS/HCC)   • Colostomy status (CMS/HCC)   • Lymphedema of left leg   • Bilateral carotid artery stenosis         ICD-10-CM ICD-9-CM   1. Bilateral carotid artery stenosis I65.23 433.10     433.30   2. Essential hypertension I10 401.9   3. Mixed hyperlipidemia E78.2 272.2   4. Lymphedema of left leg I89.0 457.1         PLAN: After thoroughly evaluating [employer]  believe the best course of action is to proceed with a right carotid endarterectomy for stroke risk reduction.  The CTA shows the stenosis to be about 90% on the right and about 60% on the left. Risks include, but are not limited to, bleeding, infection, vessel damage, nerve damage, MI, stroke, and death.  The patient understands these risks and wished to proceed.  He will undergo cardiac clearance prior to the surgery.  Once he is cleared we will schedule him appropriately.  Currently, his other issue seems to be significant leg swelling left greater than right.  He was given diuretics however this has not helped with the swelling.  I asked him to stop taking the diuretics.  His recent venous duplex was negative.  While he is here today he will demo the lymphedema pumps which I believe will help him immensely.  We did discuss his leg swelling and the treatments available.  I did discuss vascular risk factors as they pertain to the progression of vascular disease including controlling his hypertension and hyperlipidemia, are stable.  The patient is to continue taking their medications as previously discussed.   This was all discussed in full with complete understanding.  Thank you for allowing me to participate in the care of your patient.  Please do not hesitate to call with any questions or concerns.  We will keep you aware of any further encounters with Antony Duenas.      Sincerely Yours,      Michael Colin, DO

## 2020-03-09 PROBLEM — R06.09 DOE (DYSPNEA ON EXERTION): Status: ACTIVE | Noted: 2020-01-01

## 2020-03-09 PROBLEM — Z01.810 PREOP CARDIOVASCULAR EXAM: Status: ACTIVE | Noted: 2020-01-01

## 2020-03-09 PROBLEM — Z87.891 EX-SMOKER: Status: ACTIVE | Noted: 2020-01-01

## 2020-03-09 NOTE — PROGRESS NOTES
Antony Duenas  [id number]  1940  79 y.o.  male    Referring Provider: Ben Mae MD    Reason for  Visit:  Initial visit for  preoperative cardiovascular clearance under general anesthesia for bilateral carotid endarterectomy   under follow up Dr Pike for carotid stenosis       Subjective    Mild chronic exertional shortness of breath on exertion relieved with rest  No significant cough or wheezing    No palpitations  No associated chest pain  No significant pedal edema    No fever or chills  No significant expectoration    No hemoptysis  No presyncope or syncope    Tolerating current medications well with no untoward side effects   Compliant with prescribed medication regimen. Tries to adhere to cardiac diet.     Joint pain in small, medium and large joints  Chronic low back pain      Ex smoker   Strong family history of early coronary artery disease brother and son      History of present illness:  Antony Duenas is a 79 y.o. yo male with history of peripheral arterial disease bilateral  carotid artery stenosis    who presents today for   Chief Complaint   Patient presents with   • SURGERY CLEARANCE     DR PIKE   .    History  Past Medical History:   Diagnosis Date   • Colon cancer (CMS/HCC)    • Dementia (CMS/HCC)    • Hyperlipidemia    • Hypertension    • Peripheral vascular disease (CMS/HCC)    • Prostate cancer (CMS/HCC)    ,   Past Surgical History:   Procedure Laterality Date   • COLECTOMY PARTIAL / TOTAL     • COLONOSCOPY  08/02/2016   • FEMORAL ENDARTERECTOMY Bilateral 8/12/2019    Procedure: LEFT  FEMORAL ENDARTERECTOMY, THROMBECTOMY OF LEFT ILIAC ARTERY, BALLOON ANGIOPLASTY, STENT PLACEMENT;  Surgeon: Michael Pike DO;  Location: [place] [address];  Service: Vascular   • PROSTATECTOMY     • TOTAL HIP ARTHROPLASTY Bilateral    • VASCULAR SURGERY     ,   Family History   Problem Relation Age of Onset   • Heart disease Mother    • Stroke Mother    • Dementia Mother    •  Prostate cancer Father    • Breast cancer Sister    • Brain cancer Sister    • Lung cancer Sister    • No Known Problems Daughter    • Hypertension Son    • No Known Problems Maternal Grandmother    • No Known Problems Maternal Grandfather    • No Known Problems Paternal Grandmother    • No Known Problems Paternal Grandfather    ,   Social History     Tobacco Use   • Smoking status: Former Smoker     Packs/day: 1.00     Years: 50.00     Pack years: 50.00     Types: Cigarettes     Last attempt to quit: 2007     Years since quittin.0   • Smokeless tobacco: Never Used   Substance Use Topics   • Alcohol use: Yes     Comment: occ   • Drug use: No   ,     Medications  Current Outpatient Medications   Medication Sig Dispense Refill   • aspirin 81 MG tablet Take 81 mg by mouth Daily.     • atorvastatin (LIPITOR) 10 MG tablet Take 10 mg by mouth Every Night.     • Cholecalciferol (VITAMIN D3) 5000 units capsule capsule Take 5,000 Units by mouth Daily.     • clopidogrel (PLAVIX) 75 MG tablet Take 1 tablet by mouth Daily. 30 tablet 5   • Cyanocobalamin (VITAMIN B-12 PO) Take 1 tablet by mouth Daily.     • donepezil (ARICEPT) 10 MG tablet Take 1 tablet by mouth Every Night. 90 tablet 3   • ferrous sulfate 324 (65 Fe) MG tablet delayed-release EC tablet Take 1 tablet by mouth Daily With Lunch. 90 tablet 0   • furosemide (LASIX) 40 MG tablet Take 1 tablet by mouth Daily. 30 tablet 0   • indapamide (LOZOL) 2.5 MG tablet Take 1 tablet by mouth Every Morning. 90 tablet 1   • lisinopril (PRINIVIL,ZESTRIL) 20 MG tablet Take 1 tablet by mouth Daily. (Patient taking differently: Take 20 mg by mouth Daily. Patient taking every other day.) 90 tablet 2   • Melatonin 10 MG capsule Take 1 capsule by mouth As Needed.     • metoprolol tartrate (LOPRESSOR) 50 MG tablet Take 50 mg by mouth Daily.       Current Facility-Administered Medications   Medication Dose Route Frequency Provider Last Rate Last Dose   • cyanocobalamin injection  "1,000 mcg  1,000 mcg Intramuscular Q28 Days Ben Mae MD   1,000 mcg at 03/28/19 0909   • cyanocobalamin injection 1,000 mcg  1,000 mcg Intramuscular Q28 Days Ben Mae MD   1,000 mcg at 01/20/20 1259       Allergies:  Patient has no known allergies.    Review of Systems  Review of Systems   Constitution: Positive for malaise/fatigue and night sweats.   HENT: Negative.    Eyes: Negative.    Cardiovascular: Positive for dyspnea on exertion and leg swelling. Negative for chest pain, claudication, cyanosis, irregular heartbeat, near-syncope, orthopnea, palpitations, paroxysmal nocturnal dyspnea and syncope.   Respiratory: Negative.    Endocrine: Negative.    Hematologic/Lymphatic: Negative.    Skin: Negative.    Musculoskeletal: Positive for arthritis, back pain, joint pain and stiffness.   Gastrointestinal: Negative for anorexia.   Genitourinary: Negative.    Neurological: Negative.    Psychiatric/Behavioral: Negative.        Objective     Physical Exam:  /58   Pulse 61   Ht 188 cm (74.02\")   Wt 86.2 kg (190 lb)   SpO2 97%   BMI 24.38 kg/m²     Physical Exam   Constitutional: He appears well-developed.   HENT:   Head: Normocephalic.   Neck: Normal carotid pulses and no JVD present. No tracheal tenderness present. Carotid bruit is not present. No tracheal deviation and no edema present.   Cardiovascular: Regular rhythm, normal heart sounds and normal pulses.   Pulmonary/Chest: Effort normal. No stridor.   Abdominal: Soft. He exhibits no distension. There is no hepatosplenomegaly. There is no tenderness.     Vascular Status -  His right foot exhibits abnormal foot edema. His left foot exhibits abnormal foot edema.  Neurological: He is alert. He has normal strength. No cranial nerve deficit or sensory deficit.   Skin: Skin is warm.   Psychiatric: He has a normal mood and affect. His speech is normal and behavior is normal.       Results Review:       ECG 12 Lead  Date/Time: 3/9/2020 " 12:44 PM  Performed by: Luis Sams MD  Authorized by: Luis Sams MD   Comparison: compared with previous ECG from 8/10/2019  Comparison to previous ECG: premature ventricular contractions not seen  Rhythm: sinus bradycardia  Rate: bradycardic  Conduction: 1st degree AV block  QRS axis: normal    Clinical impression: abnormal EKG            Assessment/Plan   Antony was seen today for surgery clearance.    Diagnoses and all orders for this visit:    Bilateral carotid artery stenosis    Lymphedema of left leg    Colostomy status (CMS/HCC)    PVD (peripheral vascular disease) (CMS/HCC)    Essential hypertension    Iliac artery occlusion (CMS/HCC)    Ex-smoker    EATON (dyspnea on exertion)  -     Adult Stress Echo W/ Cont or Stress Agent if Necessary Per Protocol; Future    Abnormal ECG  -     Adult Transthoracic Echo Complete W/ Cont if Necessary Per Protocol; Future    Preop cardiovascular exam bilateral carotid stenosis Dr Colin    History of colon cancer    Mixed hyperlipidemia    Other orders  -     ECG 12 Lead           Plan      Orders Placed This Encounter   Procedures   • ECG 12 Lead     This order was created via procedure documentation   • Adult Transthoracic Echo Complete W/ Cont if Necessary Per Protocol     Standing Status:   Future     Standing Expiration Date:   3/9/2021     Order Specific Question:   Reason for exam?     Answer:   Dyspnea   • Adult Stress Echo W/ Cont or Stress Agent if Necessary Per Protocol     Standing Status:   Future     Standing Expiration Date:   3/9/2021     Order Specific Question:   What stress agent will be used?     Answer:   Dobutamine     Order Specific Question:   Reason for Dobutamine?     Answer:   Unable to Exercise     Order Specific Question:   Reason for exam?     Answer:   Dyspnea      Call for results of cardiac tests after done for clearance for surgery      Keep LDL below 70 mg/dl. Monitor liver and renal functions.   Monitor CBC, CMP, TSH (as indicated)  and Lipid Panel by primary         ____________________________________________________________________________________________________________________________________________  Health maintenance and recommendations      Low salt/ HTN/ Heart healthy carbohydrate restricted cardiac diet   The patient is advised to reduce or avoid caffeine or other cardiac stimulants.     Minimize or avoid  NSAID-type medications        Monitor for any signs of bleeding including red or dark stools. Fall precautions.        Advised staying uptodate with immunizations per established standard guidelines.      Offered to give patient  a copy of my notes       Questions were encouraged, asked and answered to the patient's  understanding and satisfaction. Questions if any regarding current medications and side effects, need for refills and importance of compliance to medications stressed.    Reviewed available prior notes, consults, prior visits, laboratory findings, radiology and cardiology relevant reports. Updated chart as applicable. I have reviewed the patient's medical history in detail and updated the computerized patient record as relevant.      Updated patient regarding any new or relevant abnormalities on review of records or any new findings on physical exam. Mentioned to patient about purpose of visit and desirable health short and long term goals and objectives.    Primary to monitor CBC CMP Lipid panel and TSH as applicable    ___________________________________________________________________________________________________________________________________________          Return in about 3 months (around 6/9/2020).

## 2020-03-15 NOTE — TELEPHONE ENCOUNTER
Caller states that her  was diagnosed last week with lymphedema to the left leg.  Caller states that Dr. Colin told him to call if his leg began weeping.  Wife states that today he has a 1/2 dollar size area above his left ankle that is weeping.  No vascular on call this weekend.  Caller states that leg is swollen from toes to thigh and is painful, patient asks that PCP be contacted.  Dr. Mae, patient's PCP contacted, given information and recommends bringing patient to [facility] ER for evaluation instead of local ER due to inability to do US on the weekend there.  Mrs. Duenas notified of recommendations of Dr. Mae.  Mrs. Duenas calls back concerned due to corona virus concerns.  Reassured her that all precautions are being taken and that the benefit of evaluation exceeds the risk of exposure to flu or another virus, verbalized understanding.    Reason for Disposition  • SEVERE leg swelling (e.g., swelling extends above knee, entire leg is swollen, weeping fluid)    Additional Information  • Negative: Severe difficulty breathing (e.g., struggling for each breath, speaks in single words)  • Negative: Looks like a broken bone or dislocated joint (e.g., crooked or deformed)  • Negative: Sounds like a life-threatening emergency to the triager  • Negative: Chest pain  • Negative: Followed a leg injury  • Negative: [1] Small area of swelling AND [2] followed an insect bite to the area  • Negative: Swelling of one ankle joint  • Negative: Swelling of knee is main symptom  • Negative: Pregnant  • Negative: Postpartum (from 0 to 6 weeks after delivery)  • Negative: Difficulty breathing at rest  • Negative: Entire foot is cool or blue in comparison to other side  • Negative: [1] Can't walk or can barely walk AND [2] new onset  • Negative: [1] Difficulty breathing with exertion (e.g., walking) AND [2] new onset or worsening  • Negative: [1] Red area or streak AND [2] fever  • Negative: [1] Swelling is painful to  "touch AND [2] fever  • Negative: [1] Cast on leg or ankle AND [2] now increased pain  • Negative: Patient sounds very sick or weak to the triager    Answer Assessment - Initial Assessment Questions  1. ONSET: \"When did the swelling start?\" (e.g., minutes, hours, days)      A few weeks ago  2. LOCATION: \"What part of the leg is swollen?\"  \"Are both legs swollen or just one leg?\"      Feet to thigh left leg  3. SEVERITY: \"How bad is the swelling?\" (e.g., localized; mild, moderate, severe)   - Localized - small area of swelling localized to one leg   - MILD pedal edema - swelling limited to foot and ankle, pitting edema < 1/4 inch (6 mm) deep, rest and elevation eliminate most or all swelling   - MODERATE edema - swelling of lower leg to knee, pitting edema > 1/4 inch (6 mm) deep, rest and elevation only partially reduce swelling   - SEVERE edema - swelling extends above knee, facial or hand swelling present       severe  4. REDNESS: \"Does the swelling look red or infected?\"      Today there is a 1/2 dollar size area that is draining clear fluid  5. PAIN: \"Is the swelling painful to touch?\" If so, ask: \"How painful is it?\"   (Scale 1-10; mild, moderate or severe)      Moderate pain  6. FEVER: \"Do you have a fever?\" If so, ask: \"What is it, how was it measured, and when did it start?\"       no  7. CAUSE: \"What do you think is causing the leg swelling?\"      Lymphedema   8. MEDICAL HISTORY: \"Do you have a history of heart failure, kidney disease, liver failure, or cancer?\"      no  9. RECURRENT SYMPTOM: \"Have you had leg swelling before?\" If so, ask: \"When was the last time?\" \"What happened that time?\"      Not like this  10. OTHER SYMPTOMS: \"Do you have any other symptoms?\" (e.g., chest pain, difficulty breathing)        no  11. PREGNANCY: \"Is there any chance you are pregnant?\" \"When was your last menstrual period?\"        no    Protocols used: LEG SWELLING AND EDEMA-ADULT-AH      "

## 2020-03-16 NOTE — ED PROVIDER NOTES
Subjective   Patient is a 79-year-old male with a history of colon cancer and peripheral vascular disease with vascular surgery performed by Dr. Colin in August 2019, the presents to the ER today with complaint of left lower extremity swelling.  The patient presents with his wife who provides most of the history.  She states that the patient has had left lower extremity swelling for approximately 6 weeks now.  The patient was seen by Dr. Colin and had a venous Doppler and arterial studies performed. The results are:        Us Ankle / Brachial Indices Extremity Complete     Result Date: 2/26/2020  Narrative:  History: PAD  Comments: Bilateral lower extremity arterial with multi-level pulse volume recordings and segmental pressures were performed at rest and stress.  The right ankle/brachial index is 0.96. Doppler waveforms are biphasic and PVR waveform at the ankle is mildly dampened.These findings are consistent with no significant arterial insufficiency of the right lower extremity at rest.  The left ankle/brachial index is 0.97. Doppler waveforms are biphasic and PVR waveform at the ankle is mildly dampened. These findings are consistent with no significant arterial insufficiency of the left lower extremity at rest.       Impression: Impression: 1. No significant arterial insufficiency of the right lower extremity at rest. 2. No significant arterial insufficiency of the left lower extremity at rest.       The patient reports that he also had a venous Doppler performed that was negative.  The patient wife states that the patient was diagnosed with lymphedema to the left leg.  She states that he saw Dr. Colin on March 5, 2020.  She states that they were told at that time if he had any weeping or drainage from the left leg that he should report immediately to the emergency department.  She states that today they noticed some clear liquid weeping from the left lower extremity.  She states that he has continued  to have swelling to the left leg.  She reports that she tried to call Dr. Colin however was told that he is not on-call so she decided to come to the ER for further evaluation.  She states that she did contact her primary care provider and that they were concerned that the patient could have a DVT so he was sent here for further evaluation.  He denies any fever.  He denies any new redness to the leg.  He denies any new swelling or pain to the right lower extremity.  The patient wife also mentioned that he has a slight cough with yellow phlegm.  He denies any chest pain shortness of breath.  He denies any fever.  He presents here today for further evaluation.      History provided by:  Patient   used: No    Leg Swelling   Location:  LLE swelling, dx with lymphedema  Quality:  Pt wife reports weeping from LLE  Severity:  Mild  Onset quality:  Sudden  Duration:  6 weeks  Timing:  Constant  Progression:  Unchanged  Chronicity:  New  Associated symptoms: no abdominal pain, no chest pain, no congestion, no cough, no diarrhea, no ear pain, no fatigue, no fever, no headaches, no loss of consciousness, no myalgias, no nausea, no rash, no rhinorrhea, no shortness of breath, no sore throat, no vomiting and no wheezing        Review of Systems   Constitutional: Negative for fatigue and fever.   HENT: Negative for congestion, ear pain, rhinorrhea and sore throat.    Respiratory: Negative for cough, shortness of breath and wheezing.    Cardiovascular: Negative for chest pain.   Gastrointestinal: Negative for abdominal pain, diarrhea, nausea and vomiting.   Musculoskeletal: Negative for myalgias.   Skin: Negative for rash.   Neurological: Negative for loss of consciousness and headaches.   All other systems reviewed and are negative.      Past Medical History:   Diagnosis Date   • Colon cancer (CMS/HCC)    • Dementia (CMS/HCC)    • Hyperlipidemia    • Hypertension    • Peripheral vascular disease (CMS/HCC)     • Prostate cancer (CMS/HCC)        No Known Allergies    Past Surgical History:   Procedure Laterality Date   • COLECTOMY PARTIAL / TOTAL     • COLONOSCOPY  2016   • FEMORAL ENDARTERECTOMY Bilateral 2019    Procedure: LEFT  FEMORAL ENDARTERECTOMY, THROMBECTOMY OF LEFT ILIAC ARTERY, BALLOON ANGIOPLASTY, STENT PLACEMENT;  Surgeon: Michael Colin DO;  Location: [place] [id number];  Service: Vascular   • PROSTATECTOMY     • TOTAL HIP ARTHROPLASTY Bilateral    • VASCULAR SURGERY         Family History   Problem Relation Age of Onset   • Heart disease Mother    • Stroke Mother    • Dementia Mother    • Prostate cancer Father    • Breast cancer Sister    • Brain cancer Sister    • Lung cancer Sister    • No Known Problems Daughter    • Hypertension Son    • No Known Problems Maternal Grandmother    • No Known Problems Maternal Grandfather    • No Known Problems Paternal Grandmother    • No Known Problems Paternal Grandfather        Social History     Socioeconomic History   • Marital status:      Spouse name: Not on file   • Number of children: Not on file   • Years of education: Not on file   • Highest education level: Not on file   Tobacco Use   • Smoking status: Former Smoker     Packs/day: 1.00     Years: 50.00     Pack years: 50.00     Types: Cigarettes     Last attempt to quit: 2007     Years since quittin.0   • Smokeless tobacco: Never Used   Substance and Sexual Activity   • Alcohol use: Yes     Comment: occ   • Drug use: No   • Sexual activity: Defer           Objective   Physical Exam   Constitutional: He is oriented to person, place, and time. He appears well-developed and well-nourished.   HENT:   Head: Normocephalic and atraumatic.   Eyes: Conjunctivae are normal.   Cardiovascular: Normal rate, regular rhythm and normal heart sounds.   Pulmonary/Chest: Effort normal and breath sounds normal.   Abdominal: Soft. Bowel sounds are normal.   Musculoskeletal:   3+ non-pitting  edema to LLE, pedal pulses dopplered by nursing staff and 2+, +CMS, neurovascularly intact, small, fluid filled blister noted to lateral lt ankle, no active drainage noted, no erythema, or streaking noted   Neurological: He is alert and oriented to person, place, and time.   Skin: Skin is warm and dry. Capillary refill takes less than 2 seconds.   Psychiatric: He has a normal mood and affect.   Nursing note and vitals reviewed.      Procedures           ED Course  ED Course as of Mar 15 2116   Sun Mar 15, 2020   2106 The patient's lab work is normal.  Chest x-ray shows no acute findings.  Venous Doppler showed no evidence of thrombus.  At this time the patient will be discharged home in stable condition.  Of advised the patient and wife to follow-up with Dr. Colin tomorrow.  Patient will be given a prescription for Keflex to cover him for a cellulitis.  He is advised to follow-up his primary care provider tomorrow as well.  He is advised to return to the ER for any new or worsening symptoms.    [LF]      ED Course User Index  [LF] Gifty Bailey, APRN                                 XR Chest 1 View   Final Result   1. Chronic interstitial change in the pulmonary parenchyma with no acute   cardiopulmonary process.           This report was finalized on 03/15/2020 19:54 by Dr. Danny Beard MD.      US Venous Doppler Lower Extremity Left (duplex)    (Results Pending)     Labs Reviewed   CBC WITH AUTO DIFFERENTIAL - Abnormal; Notable for the following components:       Result Value    RBC 3.72 (*)     Hemoglobin 11.2 (*)     Hematocrit 33.3 (*)     Neutrophil % 79.2 (*)     Lymphocyte % 10.1 (*)     All other components within normal limits   INFLUENZA ANTIGEN, RAPID - Normal    Narrative:     Recommend confirmation of negative results by viral culture or molecular assay.   PROTIME-INR - Normal   APTT - Normal   BNP (IN-HOUSE) - Normal    Narrative:     Among patients with dyspnea, NT-proBNP is highly sensitive  for the detection of acute congestive heart failure. In addition NT-proBNP of <300 pg/ml effectively rules out acute congestive heart failure with 99% negative predictive value.    Results may be falsely decreased if patient taking Biotin.     COMPREHENSIVE METABOLIC PANEL    Narrative:     GFR Normal >60  Chronic Kidney Disease <60  Kidney Failure <15     CBC AND DIFFERENTIAL    Narrative:     The following orders were created for panel order CBC & Differential.  Procedure                               Abnormality         Status                     ---------                               -----------         ------                     CBC Auto Differential[[id number]]        Abnormal            Final result                 Please view results for these tests on the individual orders.               MDM  Number of Diagnoses or Management Options  Lymphedema: new and requires workup     Amount and/or Complexity of Data Reviewed  Clinical lab tests: ordered and reviewed  Tests in the radiology section of CPT®: ordered and reviewed  Decide to obtain previous medical records or to obtain history from someone other than the patient: yes  Discuss the patient with other providers: yes    Patient Progress  Patient progress: stable      Final diagnoses:   Lymphedema            Gifty Bailey, APRN  03/15/20 1869

## 2020-03-25 NOTE — TELEPHONE ENCOUNTER
Spoke with patient wife and advised of upcoming procedure with patient.   Patient pre work will be completed over the phone Patient procedure is scheduled for 3/30/2020 at 600 am.  Patient to hold Plavix for 4 days prior to his procedure, the last dose will be on 3/25/2020    Patient advised of location time and prep.  Patient expressed understanding for all that was discussed.  Patient is dependent of his wife.

## 2020-03-27 NOTE — TELEPHONE ENCOUNTER
Spoke with patient wife and reminded of arrival time of 600 am for his procedure with Dr. Colin.  Patient is totally dependent on his wife.  Advised that the staff would discuss further when they arrived.

## 2020-03-30 PROBLEM — I65.29 CAROTID STENOSIS: Status: ACTIVE | Noted: 2020-01-01

## 2020-03-30 NOTE — ANESTHESIA PROCEDURE NOTES
Arterial Line    Pre-sedation assessment completed: 3/30/2020 10:30 AM    Patient reassessed immediately prior to procedure    Patient location during procedure: pre-op  Start time: 3/30/2020 10:31 AM  Stop Time:3/30/2020 10:33 AM       Line placed for respiratory failure and hemodynamic monitoring.  Performed By   Anesthesiologist: Liyah Leal MD  Preanesthetic Checklist  Completed: patient identified, site marked, surgical consent, pre-op evaluation, timeout performed, IV checked, risks and benefits discussed and monitors and equipment checked  Arterial Line Prep   Sterile Tech: cap, gloves, gown, mask and sterile barriers  Prep: ChloraPrep  Patient monitoring: blood pressure monitoring, continuous pulse oximetry and EKG  Arterial Line Procedure   Laterality:right  Location:  radial artery  Catheter size: 20 G   Guidance: palpation technique  Number of attempts: 1  Successful placement: yes  Post Assessment   Dressing Type: occlusive dressing applied, secured with tape and wrist guard applied.   Complications no  Circ/Move/Sens Assessment: normal and unchanged.   Patient Tolerance: patient tolerated the procedure well with no apparent complications

## 2020-03-30 NOTE — ANESTHESIA POSTPROCEDURE EVALUATION
"Patient: Antony Duenas    Procedure Summary     Date:  03/30/20 Room / Location:  [facility] OR  /  PAD HYBRID OR 12    Anesthesia Start:  1039 Anesthesia Stop:  1230    Procedure:  RIGHT CAROTID ENDARTERECTOMY WITH EEG (Right Neck) Diagnosis:       Bilateral carotid artery stenosis      (Bilateral carotid artery stenosis [I65.23])    Surgeon:  Michael Colin DO Provider:  Philip Romero CRNA    Anesthesia Type:  general ASA Status:  3          Anesthesia Type: general    Vitals  Vitals Value Taken Time   BP 78/46 3/30/2020  3:10 PM   Temp 97.1 °F (36.2 °C) 3/30/2020  3:10 PM   Pulse 53 3/30/2020  3:14 PM   Resp 14 3/30/2020  3:10 PM   SpO2 100 % 3/30/2020  3:14 PM   Vitals shown include unvalidated device data.        Post Anesthesia Care and Evaluation    Patient location during evaluation: PACU  Patient participation: complete - patient participated  Level of consciousness: awake and alert  Pain management: adequate  Airway patency: patent  Anesthetic complications: No anesthetic complications  PONV Status: none  Cardiovascular status: acceptable and hemodynamically stable  Respiratory status: acceptable  Hydration status: acceptable    Comments: Blood pressure (!) 86/40, pulse (!) 45, temperature 98.1 °F (36.7 °C), temperature source Oral, resp. rate 16, height 188 cm (74.02\"), weight 85.9 kg (189 lb 6 oz), SpO2 96 %.    Patient discharged from PACU based upon Janneth score. Please see RN notes for further details      "

## 2020-03-30 NOTE — ANESTHESIA PROCEDURE NOTES
Airway  Urgency: elective    Date/Time: 3/30/2020 10:46 AM  Airway not difficult    General Information and Staff    Patient location during procedure: OR  CRNA: Tino Jain CRNA    Indications and Patient Condition  Indications for airway management: airway protection    Preoxygenated: yes  MILS maintained throughout  Mask difficulty assessment: 0 - not attempted    Final Airway Details  Final airway type: endotracheal airway      Successful airway: ETT  Cuffed: yes   Successful intubation technique: direct laryngoscopy and video laryngoscopy  Endotracheal tube insertion site: oral  Blade: Mario  Blade size: 3  ETT size (mm): 7.5  Cormack-Lehane Classification: grade I - full view of glottis  Placement verified by: chest auscultation and capnometry   Cuff volume (mL): 5  Measured from: lips  ETT/EBT  to lips (cm): 22  Number of attempts at approach: 1  Assessment: lips, teeth, and gum same as pre-op and atraumatic intubation

## 2020-04-01 NOTE — OUTREACH NOTE
Prep Survey      Responses   [facility]?  [place]   Is LACE score < 7 ?  No   Eligibility  Readm Mgmt   Discharge diagnosis  carotid stenosis,  RIGHT CAROTID ENDARTERECTOMY WITH EEG   Does the patient have one of the following disease processes/diagnoses(primary or secondary)?  General Surgery   Does the patient have Home health ordered?  No   Is there a DME ordered?  No   Comments regarding appointments  multiple apmts listed   Prep survey completed?  Yes          Paty Kwan RN

## 2020-04-06 NOTE — OUTREACH NOTE
General Surgery Week 1 Survey      Responses   [facility] patient discharged from?  [place]   Does the patient have one of the following disease processes/diagnoses(primary or secondary)?  General Surgery   Is there a successful TCM telephone encounter documented?  No   Week 1 attempt successful?  No   Unsuccessful attempts  Attempt 1          Cheryle Caballero RN

## 2020-04-09 NOTE — OUTREACH NOTE
General Surgery Week 1 Survey      Responses   [facility] [place] patient discharged from?  [place]   Does the patient have one of the following disease processes/diagnoses(primary or secondary)?  General Surgery   Is there a successful TCM telephone encounter documented?  No   Week 1 attempt successful?  No   Unsuccessful attempts  Attempt 2          Elizabeth Duenas RN

## 2020-04-10 NOTE — OUTREACH NOTE
General Surgery Week 1 Survey      Responses   [facility] patient discharged from?  [place]   Does the patient have one of the following disease processes/diagnoses(primary or secondary)?  General Surgery   Is there a successful TCM telephone encounter documented?  No   Week 1 attempt successful?  No   Unsuccessful attempts  Attempt 3          Pema Oconnor RN

## 2020-04-13 NOTE — PROGRESS NOTES
Subjective   Antony Duenas is a 79 y.o. male.     79-year-old male with dementia, and recent right carotid endarterectomy, complaining of back pain from the low back to the neck after a fall    Pain   This is a new problem. The current episode started more than 1 month ago. The problem occurs daily. The problem has been waxing and waning. Associated symptoms include neck pain. Pertinent negatives include no chest pain or fever. Associated symptoms comments: Low back with pain going to his neck. Exacerbated by: Motion. He has tried acetaminophen for the symptoms. The treatment provided no relief.     Vitals:    04/13/20 1342   BP: 126/81   Pulse: 72   Temp: 97.9 °F (36.6 °C)   SpO2: 98%       The following portions of the patient's history were reviewed and updated as appropriate: allergies, current medications, past family history, past medical history, past social history, past surgical history and problem list.    Review of Systems   Constitutional: Negative for fever.   Respiratory: Negative for shortness of breath.    Cardiovascular: Negative for chest pain.   Musculoskeletal: Positive for back pain and neck pain.        The patient had a fall a few weeks ago just before his carotid surgery that broke the [facility]   Neurological:        Significant dementia aggravating no doubt by his recent anesthesia for carotid endarterectomy       Objective   Physical Exam   Constitutional: He is oriented to person, place, and time. He appears well-developed and well-nourished.   Neck:   Right carotid endarterectomy scar looks very good without evidence of infection   Cardiovascular: Normal rate and regular rhythm.   Pulmonary/Chest: Effort normal and breath sounds normal.   Musculoskeletal: He exhibits edema.   Edema is better in his legs-using a  lymphedema pump   Neurological: He is alert and oriented to person, place, and time.   Psychiatric: He has a normal mood and affect.   Thought content and judgment obviously  abnormal   Nursing note and vitals reviewed.      Patient's Body mass index is 21.87 kg/m². BMI is within normal parameters. No follow-up required..      Assessment/Plan   Patient Active Problem List   Diagnosis   • Cholelithiasis   • Prostate cancer (CMS/HCC)   • Iliac artery occlusion (CMS/HCC)   • Hyperlipidemia   • History of colon cancer   • Anemia   • Hypertension   • Arthritis   • PVD (peripheral vascular disease) (CMS/HCC)   • Colostomy status (CMS/HCC)   • Lymphedema of left leg   • Bilateral carotid artery stenosis   • Ex-smoker   • EATON (dyspnea on exertion)   • Preop cardiovascular exam bilateral carotid stenosis Dr Colin   • Carotid stenosis     Antony was seen today for back pain, dehydration and fatigue.    Diagnoses and all orders for this visit:    Acute low back pain, unspecified back pain laterality, unspecified whether sciatica present  -     XR Spine Thoracic 3 View; Future  -     XR Spine Lumbar 2 or 3 View; Future    Prostate cancer (CMS/HCC)    Dementia with behavioral disturbance, unspecified dementia type (CMS/HCC)  -     LORazepam (ATIVAN) 0.5 MG tablet; 1 pill at 11:00 PM as needed for rest    Urinary tract infection with hematuria, site unspecified  -     POC Urinalysis Dipstick, Multipro  -     Urine Culture - Urine, Urine, Clean Catch    Other orders  -     memantine (NAMENDA) 10 MG tablet; Take 1 tablet by mouth 2 (Two) Times a Day.       Return in about 4 weeks (around 5/11/2020).       Plan x-rays to rule out compression fracture after trauma-add Ativan to take at 11 o'clock when he becomes behavioral problem-add Namenda back, urinalysis and urine culture, will need Kermit on return      Electronically signed by Ben Mae MD 04/13/2020

## 2020-04-14 NOTE — TELEPHONE ENCOUNTER
SOLOMON FROM [facility] CALLED TRYING TO COMPLETE MEDICATION PA FOR LORAZEPAM 0.5MG.    NEEDING CLARIFICATION:  DIAGNOSIS  MEDS TRIED  PT EXPERIENCE AGITATION OR ANXIETY.    PLEASE CALL BACK-STATES SHE MAY NOT BE THE PERSON TO ANSWER CALL BUT ANYONE SHOULD BE ABLE TO ASSIST.      CASE ID:  [id number]

## 2020-04-15 NOTE — OUTREACH NOTE
General Surgery Week 2 Survey      Responses   [facility] patient discharged from?  [place]   Does the patient have one of the following disease processes/diagnoses(primary or secondary)?  General Surgery   Week 2 attempt successful?  Yes   Call start time  1320   Call end time  1321   Discharge diagnosis  carotid stenosis,  RIGHT CAROTID ENDARTERECTOMY WITH EEG   Is patient permission given to speak with other caregiver?  Yes   List who call center can speak with  spouse- Ragini   Person spoke with today (if not patient) and relationship  spouse- Ragini   Has the patient kept scheduled appointments due by today?  Yes   What is the patient's perception of their health status since discharge?  Improving   Is the patient/caregiver able to teach back the hierarchy of who to call/visit for symptoms/problems? PCP, Specialist, Home health nurse, Urgent Care, ED, 911  Yes   Additional teach back comments  Per spouse, pt is doing really good, they had a telehealth video visit with MD today, no questions or concerns at this time.   Week 2 call completed?  Yes          Elizabeth Duenas RN

## 2020-04-15 NOTE — PROGRESS NOTES
You have chosen to receive care through a telephone visit. Do you consent to use a telephone visit for your medical care today? Yes    This visit has been rescheduled as a phone visit to comply with patient safety concerns in accordance with CDC recommendations. Total time of discussion was 5 minutes.    Antony Duenas is a 79 y.o. male follow for lower extremity PAD and carotid occlusive disease.  He did previously have surgery at [place] in 2014.  At that time he had a left iliac stent placed.  August 12, 2019 he underwent a left common femoral endarterectomy, thrombectomy of the left common iliac and external iliac, balloon angioplasty of the right external iliac with stenting.  He is using home lymphedema pumps with good relief per his wife's report.  He is maintained on aspirin, Plavix, and Lipitor.  He denies any strokelike symptoms.  His only complaint is back pain at this time.  He did have some x-rays after seeing his PCP and is waiting to hear results.  He did undergo a right carotid endarterectomy on 3/30/2020.  His wife states his incision is healed and he is having no problems regarding the incision.  It is taking him a bit cognitively to bounce back from anesthesia, but overall doing well.  We will need to see him back in 6 months with repeat noninvasive testing including a carotid duplex and ABIs.  Would like him to continue using his home lymphedema pumps and taking his medications as directed.     Diagnosis Plan   1. Bilateral carotid artery stenosis  US Carotid Bilateral   2. PAD (peripheral artery disease) (CMS/[facility])  US Ankle / Brachial Indices Extremity Complete   3. Mixed hyperlipidemia     4. Essential hypertension          TAB Perez   04/15/2020   10:44

## 2020-04-21 NOTE — OUTREACH NOTE
General Surgery Week 3 Survey      Responses   [facility] [place] [facility]   Does the patient have one of the following disease processes/diagnoses(primary or secondary)?  General Surgery   Week 3 attempt successful?  No   Unsuccessful attempts  Attempt 1          Jayla Sanchez RN

## 2020-04-22 NOTE — OUTREACH NOTE
General Surgery Week 3 Survey      Responses   [facility] patient discharged from?  [place]   Does the patient have one of the following disease processes/diagnoses(primary or secondary)?  General Surgery   Week 3 attempt successful?  Yes   Call start time  1643   Call end time  1647   Discharge diagnosis  carotid stenosis,  RIGHT CAROTID ENDARTERECTOMY WITH EEG   Person spoke with today (if not patient) and relationship  spouse- Ragini Gabriel reviewed with patient/caregiver?  Yes   Is the patient having any side effects they believe may be caused by any medication additions or changes?  No   Does the patient have all medications related to this admission filled (includes all antibiotics, pain medications, etc.)  Yes   Prescription comments  antibiotics added for UTI x 7 days on 4/20/20   Is the patient taking all medications as directed (includes completed medication regime)?  Yes   Does the patient have a follow up appointment scheduled with their surgeon?  Yes   Has the patient kept scheduled appointments due by today?  Yes   Has home health visited the patient within 72 hours of discharge?  N/A   Psychosocial issues?  No   Comments  states incision healing well   Did the patient receive a copy of their discharge instructions?  Yes   Nursing interventions  Reviewed instructions with patient   What is the patient's perception of their health status since discharge?  Improving   Nursing interventions  Nurse provided patient education   Is the patient /caregiver able to teach back basic post-op care?  Take showers only when approved by MD-sponge bathe until then, No tub bath, swimming, or hot tub until instructed by MD, Keep incision areas clean,dry and protected, Do not remove steri-strips, Lifting as instructed by MD in discharge instructions   Is the patient/caregiver able to teach back signs and symptoms of incisional infection?  Increased redness, swelling or pain at the incisonal site, Increased drainage  or bleeding, Incisional warmth, Pus or odor from incision, Fever   Is the patient/caregiver able to teach back steps to recovery at home?  Set small, achievable goals for return to baseline health, Rest and rebuild strength, gradually increase activity   Is the patient/caregiver able to teach back the hierarchy of who to call/visit for symptoms/problems? PCP, Specialist, Home health nurse, Urgent Care, ED, 911  Yes   Additional teach back comments  wife states pt still having some confusion, improves at times, has no appetite, does drink beverages such as tea and juice, wife tries to get pt to drink water   Week 3 call completed?  Yes          Ana M Foote, RN

## 2020-04-30 NOTE — OUTREACH NOTE
General Surgery Week 4 Survey      Responses   [facility] patient discharged from?  [place]   Does the patient have one of the following disease processes/diagnoses(primary or secondary)?  General Surgery   Week 4 attempt successful?  No          Aleks Murillo RN

## 2020-05-18 NOTE — TELEPHONE ENCOUNTER
WIFE STATES PT NEEDING REFILLS ON  MEMANTINE 10MG & DONEPEZIL 10MG. WIFE STATES SHE DOES NOT SEE ANY IMPROVEMENT WHILE TAKING THEM-IS IT OK TO STOP THEM?      PLEASE ADVISE

## 2020-05-18 NOTE — TELEPHONE ENCOUNTER
SPOKE WITH WIFE-ADVISED OK TO STOP BUT TO RESUME IF NOTICED DETERIORATION. VERBALIZED UNDERSTANDING.

## 2020-06-12 NOTE — TELEPHONE ENCOUNTER
Patient's wife contacted office.  Patient is prescribed lorazepam at bedtime for sleep.  Patient is becoming more agitated during day and wife would like to know if she can give during the day also.

## 2020-06-23 NOTE — PROGRESS NOTES
CC: dementia with behavioral disturbance    History:  Antony Duenas is a 80 y.o. male who presents today for evaluation of the above problems.    Amandeep is experiencing progressive dementia and has started having behavioral disturbance over the past few months making it difficult for his wife to assist with groom and changing clothes.  He was taking ativan once daily at night to help with rest, however, per Dr. Mae's instruction, over the past month she has been giving him this twice a day. Dr. Mae had indicated that the medication could be taken up to 3x per day.  She states that she does not want him to be lethargic and that currently she feels that twice a day is a good frequency.     CONTROLLED SUBSTANCE TRACKING 2/28/2018 6/23/2020   Last Kermit 2/28/2018 6/23/2020   Report Number - [id number]   Last Controlled Substance Agreement 2/28/2018 6/23/2020       HPI  ROS:  Review of Systems   Constitutional: Negative for fever.   Psychiatric/Behavioral: Positive for agitation and behavioral problems.        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)    • Dementia (CMS/HCC)    • Hard of hearing    • Hyperlipidemia    • Hypertension    • Lymphedema     left leg and right leg some   • Peripheral vascular disease (CMS/HCC)    • Prostate cancer (CMS/HCC)    • SunDown syndrome     wife states he is more confused at night, and from receiving anesthesia   • Urinary incontinence      Past Surgical History:   Procedure Laterality Date   • CAROTID ENDARTERECTOMY Right 3/30/2020    Procedure: RIGHT CAROTID ENDARTERECTOMY WITH EEG;  Surgeon: Michael Colin DO;  Location: [place] [address];  Service: Vascular;  Laterality: Right;   • COLECTOMY PARTIAL / TOTAL     • COLONOSCOPY  08/02/2016   • FEMORAL ENDARTERECTOMY Bilateral 8/12/2019    Procedure: LEFT  FEMORAL ENDARTERECTOMY, THROMBECTOMY OF LEFT  ILIAC ARTERY, BALLOON ANGIOPLASTY, STENT PLACEMENT;  Surgeon: [place] Colin DO;  Location: [facility] HYBRID OR 12;  Service: Vascular   • PROSTATECTOMY     • TOTAL HIP ARTHROPLASTY Bilateral    • VASCULAR SURGERY       Family History   Problem Relation Age of Onset   • Heart disease Mother    • Stroke Mother    • Dementia Mother    • Prostate cancer Father    • Breast cancer Sister    • Brain cancer Sister    • Lung cancer Sister    • No Known Problems Daughter    • Hypertension Son    • No Known Problems Maternal Grandmother    • No Known Problems Maternal Grandfather    • No Known Problems Paternal Grandmother    • No Known Problems Paternal Grandfather       reports that he quit smoking about 13 years ago. His smoking use included cigarettes. He has a 50.00 pack-year smoking history. He has never used smokeless tobacco. He reports that he drank alcohol. He reports that he does not use drugs.      Current Outpatient Medications:   •  Acetaminophen (TYLENOL EXTRA STRENGTH PO), Take 1 tablet by mouth As Needed (pain in legs, feet)., Disp: , Rfl:   •  atorvastatin (LIPITOR) 10 MG tablet, Take 10 mg by mouth Every Night., Disp: , Rfl:   •  Cholecalciferol (VITAMIN D3) 5000 units capsule capsule, Take 5,000 Units by mouth Every Other Day., Disp: , Rfl:   •  clopidogrel (PLAVIX) 75 MG tablet, TAKE 1 TABLET BY MOUTH DAILY, Disp: 90 tablet, Rfl: 0  •  donepezil (ARICEPT) 10 MG tablet, Take 1 tablet by mouth Every Night., Disp: 90 tablet, Rfl: 3  •  indapamide (LOZOL) 2.5 MG tablet, Take 1 tablet by mouth Daily., Disp: 90 tablet, Rfl: 1  •  lisinopril (PRINIVIL,ZESTRIL) 20 MG tablet, Take 1 tablet by mouth Daily. (Patient taking differently: Take 20 mg by mouth Daily. Patient taking every other day.), Disp: 90 tablet, Rfl: 2  •  LORazepam (ATIVAN) 0.5 MG tablet, Take 1 tablet by mouth 2 (Two) Times a Day., Disp: 60 tablet, Rfl: 0  •  memantine (NAMENDA) 10 MG tablet, Take 1 tablet by mouth 2 (Two) Times a Day., Disp: 60  "tablet, Rfl: 5  •  metoprolol tartrate (LOPRESSOR) 50 MG tablet, Take 25 mg by mouth Daily., Disp: , Rfl:     Current Facility-Administered Medications:   •  cyanocobalamin injection 1,000 mcg, 1,000 mcg, Intramuscular, Q28 Days, Ben Mae MD, 1,000 mcg at 01/20/20 1259    OBJECTIVE:  /70 (BP Location: Left arm, Patient Position: Sitting, Cuff Size: Adult)   Pulse 64   Temp 98.4 °F (36.9 °C) (Temporal)   Ht 188 cm (74\")   Wt 80 kg (176 lb 6.4 oz)   SpO2 98%   BMI 22.65 kg/m²    Physical Exam   Constitutional: He is oriented to person, place, and time. Vital signs are normal. He appears well-developed and well-nourished.   Cardiovascular: Normal rate and regular rhythm.   Pulmonary/Chest: Effort normal and breath sounds normal.   Neurological: He is alert and oriented to person, place, and time.   Psychiatric: He has a normal mood and affect. His behavior is normal.   Vitals reviewed.      Assessment/Plan    Antony was seen today for med refill.    Diagnoses and all orders for this visit:    Dementia with behavioral disturbance, unspecified dementia type (CMS/HCC)  -     LORazepam (ATIVAN) 0.5 MG tablet; Take 1 tablet by mouth 2 (Two) Times a Day.    Patient was masked upon entering facility.  I wore N95 mask with face shield and gloves.  MA wore N95 mask with face shield and gloves.        Kermit reviewed and appropriate.  Contract and UDS completed today.     An After Visit Summary was printed and given to the patient at discharge.  Return in about 3 months (around 9/23/2020).       TAB Hopkins 06/23/2020    Electronically signed.  "

## 2020-07-06 NOTE — TELEPHONE ENCOUNTER
Spoke with patient wife and advised of upcoming appointment with Dr. Avilez on 7/30/2020.  Patient wife expressed understanding for all that was discussed.  She stated that she did not need a reminder mailed

## 2020-07-06 NOTE — TELEPHONE ENCOUNTER
Patient's wife called and stated patient needs referral to podiatry [place] - toe fungus, toe pain

## 2020-07-21 NOTE — TELEPHONE ENCOUNTER
Spoke with Mrs Duenas in regards to Mr Duenas about coming in on Thursday, July 30th, 2020 to see Franco with [facility] for Lymphedema demonstration. Mrs Duenas states she wouldn't mind coming in but Mr Duenas does have a lot going on as he has early onset of dementia, incontinence, as well as colostomey bag. I advised Mrs Duenas I would speak with Franco to see about setting up an in home demonstration to make it easier for Mr Duenas.

## 2020-07-28 NOTE — PROGRESS NOTES
[facility] - PODIATRY    Today's Date: 07/29/20    Patient Name: Antony Duenas  MRN: [id number]  CSN: [id number]  PCP: Ben Mae MD  Referring Provider: Ben Mae,*    SUBJECTIVE     Chief Complaint   Patient presents with   • Establish Care     pt c/o long, thickened toenails - bilateral foot - cannot rate pain -   pt does have lymphedema , he is using a lymphedema pump- PCP Dr. Adorno last visit 6/23/20- non-diabetic - pt is on blood thinner      HPI: Antony Duenas, a 80 y.o.male, comes to clinic as a(n) new patient complaining of painful toenails. Patient has h/o arthritis, colon cancer, Colostomy, Dementia with SunDown syndrome, hard of hearing, HLD, HTN, Lymphedema, PVD, Prostate cancer. Patient is non-diabetic. Denies numbness in feet. Denies open wounds or sores. Admits to swelling in legs as well as h/o stents placed in legs. Has compression stockings and lymphedema pumps. Follows with Dr. Colin. States that his toenails are long, thick and crumbly. He is unable to care for them himself. Takes Plavix. Unable to rate pain. Denies previous treatment. Denies any constitutional symptoms. No other pedal complaints at this time.    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)    • Dementia (CMS/HCC)    • Hard of hearing    • Hyperlipidemia    • Hypertension    • Lymphedema     left leg and right leg some   • Peripheral vascular disease (CMS/HCC)    • Prostate cancer (CMS/HCC)    • SunDown syndrome     wife states he is more confused at night, and from receiving anesthesia   • Urinary incontinence      Past Surgical History:   Procedure Laterality Date   • CAROTID ENDARTERECTOMY Right 3/30/2020    Procedure: RIGHT CAROTID ENDARTERECTOMY WITH EEG;  Surgeon: [place] Colin DO;  Location: [place] [address];  Service: Vascular;  Laterality: Right;   • COLECTOMY PARTIAL / TOTAL     • COLONOSCOPY   2016   • FEMORAL ENDARTERECTOMY Bilateral 2019    Procedure: LEFT  FEMORAL ENDARTERECTOMY, THROMBECTOMY OF LEFT ILIAC ARTERY, BALLOON ANGIOPLASTY, STENT PLACEMENT;  Surgeon: [place] Colin DO;  Location: [facility] OR ;  Service: Vascular   • PROSTATECTOMY     • TOTAL HIP ARTHROPLASTY Bilateral    • VASCULAR SURGERY       Family History   Problem Relation Age of Onset   • Heart disease Mother    • Stroke Mother    • Dementia Mother    • Prostate cancer Father    • Breast cancer Sister    • Brain cancer Sister    • Lung cancer Sister    • No Known Problems Daughter    • Hypertension Son    • No Known Problems Maternal Grandmother    • No Known Problems Maternal Grandfather    • No Known Problems Paternal Grandmother    • No Known Problems Paternal Grandfather      Social History     Socioeconomic History   • Marital status:      Spouse name: Not on file   • Number of children: Not on file   • Years of education: Not on file   • Highest education level: Not on file   Tobacco Use   • Smoking status: Former Smoker     Packs/day: 1.00     Years: 50.00     Pack years: 50.00     Types: Cigarettes     Last attempt to quit: 2007     Years since quittin.4   • Smokeless tobacco: Never Used   Substance and Sexual Activity   • Alcohol use: Not Currently   • Drug use: No   • Sexual activity: Defer     No Known Allergies  Current Outpatient Medications   Medication Sig Dispense Refill   • Acetaminophen (TYLENOL EXTRA STRENGTH PO) Take 1 tablet by mouth As Needed (pain in legs, feet).     • aspirin 81 MG EC tablet Take 81 mg by mouth Daily.     • atorvastatin (LIPITOR) 10 MG tablet TAKE 1 TABLET BY MOUTH EVERY NIGHT. 90 tablet 1   • Cholecalciferol (VITAMIN D3) 5000 units capsule capsule Take 5,000 Units by mouth Every Other Day.     • clopidogrel (PLAVIX) 75 MG tablet TAKE 1 TABLET BY MOUTH DAILY 90 tablet 0   • donepezil (ARICEPT) 10 MG tablet Take 1 tablet by mouth Every Night. 90 tablet 3    • indapamide (LOZOL) 2.5 MG tablet Take 1 tablet by mouth Daily. 90 tablet 1   • lisinopril (PRINIVIL,ZESTRIL) 20 MG tablet Take 1 tablet by mouth Daily. (Patient taking differently: Take 20 mg by mouth Daily. Patient taking every other day.) 90 tablet 2   • LORazepam (ATIVAN) 0.5 MG tablet Take 1 tablet by mouth 2 (Two) Times a Day. 60 tablet 0   • memantine (NAMENDA) 10 MG tablet Take 1 tablet by mouth 2 (Two) Times a Day. 60 tablet 5   • metoprolol tartrate (LOPRESSOR) 50 MG tablet Take 25 mg by mouth Daily.       Current Facility-Administered Medications   Medication Dose Route Frequency Provider Last Rate Last Dose   • cyanocobalamin injection 1,000 mcg  1,000 mcg Intramuscular Q28 Days Ben Mae MD   1,000 mcg at 20 1259     Review of Systems   Constitutional: Negative for chills and fever.   HENT: Negative for congestion.    Respiratory: Negative for shortness of breath.    Cardiovascular: Positive for leg swelling. Negative for chest pain.   Gastrointestinal: Negative for constipation, diarrhea, nausea and vomiting.   Musculoskeletal: Positive for arthralgias and gait problem.        Foot pain   Skin: Negative for wound.   Neurological: Positive for weakness. Negative for numbness.       OBJECTIVE     Vitals:    20 1116   BP: 96/60   Pulse: 65   SpO2: 96%       PHYSICAL EXAM  GEN:   Accompanied by wife.     Foot/Ankle Exam:       General:   Appearance: appears stated age and healthy and elderly    Orientation: AAOx3    Affect: appropriate    Gait: unimpaired    Assistance: cane    Shoe Gear:  Casual shoes    VASCULAR      Right Foot Vascularity   Dorsalis pedis:  1+  Posterior tibial:  1+  Skin Temperature: warm    Edema Gradin+ and pitting  CFT:  3  Pedal Hair Growth:  Present  Varicosities: mild varicosities       Left Foot Vascularity   Dorsalis pedis:  1+  Posterior tibial:  1+  Skin Temperature: warm    Edema Gradin+ and pitting  CFT:  3  Pedal Hair Growth:   Present  Varicosities: mild varicosities        NEUROLOGIC     Right Foot Neurologic   Light touch sensation:  Diminished  Vibratory sensation:  Normal  Hot/Cold sensation: normal    Protective Sensation using [place] Monofilament:  10     Left Foot Neurologic   Light touch sensation:  Diminished  Vibratory sensation:  Normal  Hot/cold sensation: normal    Protective Sensation using [place] Monofilament:  10     MUSCULOSKELETAL      Right Foot Musculoskeletal   Ecchymosis:  None  Tenderness: toenails    Arch:  Normal     Left Foot Musculoskeletal   Ecchymosis:  None  Tenderness: toenails    Arch:  Normal     MUSCLE STRENGTH     Right Foot Muscle Strength   Foot dorsiflexion:  4+  Foot plantar flexion:  4+  Foot inversion:  4+  Foot eversion:  4+     Left Foot Muscle Strength   Foot dorsiflexion:  4+  Foot plantar flexion:  4+  Foot inversion:  4+  Foot eversion:  4+     RANGE OF MOTION      Right Foot Range of Motion   Foot and ankle ROM within normal limits       Left Foot Range of Motion   Foot and ankle ROM within normal limits       DERMATOLOGIC     Right Foot Dermatologic   Skin: skin intact    Skin comment:  Edematous  Nails: onychomycosis, abnormally thick, subungual debris and dystrophic nails       Left Foot Dermatologic   Skin: skin intact    Skin comment:  Edematous  Nails: onychomycosis, abnormally thick, subungual debris and dystrophic nails        RADIOLOGY/NUCLEAR:  No results found.    LABORATORY/CULTURE RESULTS:      PATHOLOGY RESULTS:       ASSESSMENT/PLAN     Antony was seen today for establish care.    Diagnoses and all orders for this visit:    Onychomycosis    Foot pain, bilateral    Antiplatelet or antithrombotic long-term use    Lymphedema of both lower extremities    PVD (peripheral vascular disease) (CMS/HCC)      Comprehensive lower extremity examination and evaluation was performed.  Discussed findings and treatment plan including risks, benefits, and treatment options  with patient in detail. Patient agreed with treatment plan.  After verbal consent obtained, nail(s) x10 debrided of length and thickness with nail nipper without incidence  Patient may maintain nails and calluses at home utilizing emery board or pumice stone between visits as needed   Continue compression stockings and lymphedema pumps daily.   Continue follow-up with vascular surgery.  An After Visit Summary was printed and given to the patient at discharge, including (if requested) any available informative/educational handouts regarding diagnosis, treatment, or medications. All questions were answered to patient/family satisfaction. Should symptoms fail to improve or worsen they agree to call or return to clinic or to go to the Emergency Department. Discussed the importance of following up with any needed screening tests/labs/specialist appointments and any requested follow-up recommended by me today. Importance of maintaining follow-up discussed and patient accepts that missed appointments can delay diagnosis and potentially lead to worsening of conditions.  Return in about 3 months (around 10/29/2020)., or sooner if acute issues arise.    Lab Frequency Next Occurrence   Follow Anesthesia Guidelines / Protocol Once 03/25/2020   Provide NPO Instructions to Patient Once 03/30/2020   Chlorhexidine Skin Prep Once 03/30/2020   US Carotid Bilateral Once 10/12/2020   US Ankle / Brachial Indices Extremity Complete Once 10/12/2020       This document has been electronically signed by Jc Avilez DPM on July 29, 2020 11:42

## 2020-08-17 NOTE — TELEPHONE ENCOUNTER
WIFE STATES WAS SUGGESTED TO TAKE EVERY 6 HRS AND HE HAS BEEN---OK TO CHANGE DIRECTIONS    [facility]    [facility]   06.23.20        WIFE IS INQUIRING IF EITHER OF YOU COULD CALL HER TODAY-HAS SOME QUESTIONS AND CONCERNS.

## 2020-08-28 NOTE — PATIENT INSTRUCTIONS
Fall Prevention in the Home, Adult  Falls can cause injuries and can affect people from all age groups. There are many simple things that you can do to make your home safe and to help prevent falls. Ask for help when making these changes, if needed.  What actions can I take to prevent falls?  General instructions  · Use good lighting in all rooms. Replace any light bulbs that burn out.  · Turn on lights if it is dark. Use night-lights.  · Place frequently used items in easy-to-reach places. Lower the shelves around your home if necessary.  · Set up furniture so that there are clear paths around it. Avoid moving your furniture around.  · Remove throw rugs and other tripping hazards from the floor.  · Avoid walking on wet floors.  · Fix any uneven floor surfaces.  · Add color or contrast paint or tape to grab bars and handrails in your home. Place contrasting color strips on the first and last steps of stairways.  · When you use a stepladder, make sure that it is completely opened and that the sides are firmly locked. Have someone hold the ladder while you are using it. Do not climb a closed stepladder.  · Be aware of any and all pets.  What can I do in the bathroom?         · Keep the floor dry. Immediately clean up any water that spills onto the floor.  · Remove soap buildup in the tub or shower on a regular basis.  · Use non-skid mats or decals on the floor of the tub or shower.  · Attach bath mats securely with double-sided, non-slip rug tape.  · If you need to sit down while you are in the shower, use a plastic, non-slip stool.  · Install grab bars by the toilet and in the tub and shower. Do not use towel bars as grab bars.  What can I do in the bedroom?  · Make sure that a bedside light is easy to reach.  · Do not use oversized bedding that drapes onto the floor.  · Have a firm chair that has side arms to use for getting dressed.  What can I do in the kitchen?  · Clean up any spills right away.  · If you  need to reach for something above you, use a sturdy step stool that has a grab bar.  · Keep electrical cables out of the way.  · Do not use floor polish or wax that makes floors slippery. If you must use wax, make sure that it is non-skid floor wax.  What can I do in the stairways?  · Do not leave any items on the stairs.  · Make sure that you have a light switch at the top of the stairs and the bottom of the stairs. Have them installed if you do not have them.  · Make sure that there are handrails on both sides of the stairs. Fix handrails that are broken or loose. Make sure that handrails are as long as the stairways.  · Install non-slip stair treads on all stairs in your home.  · Avoid having throw rugs at the top or bottom of stairways, or secure the rugs with carpet tape to prevent them from moving.  · Choose a carpet design that does not hide the edge of steps on the stairway.  · Check any carpeting to make sure that it is firmly attached to the stairs. Fix any carpet that is loose or worn.  What can I do on the outside of my home?  · Use bright outdoor lighting.  · Regularly repair the edges of walkways and driveways and fix any cracks.  · Remove high doorway thresholds.  · Trim any shrubbery on the main path into your home.  · Regularly check that handrails are securely fastened and in good repair. Both sides of any steps should have handrails.  · Install guardrails along the edges of any raised decks or porches.  · Clear walkways of debris and clutter, including tools and rocks.  · Have leaves, snow, and ice cleared regularly.  · Use sand or salt on walkways during winter months.  · In the garage, clean up any spills right away, including grease or oil spills.  What other actions can I take?  · Wear closed-toe shoes that fit well and support your feet. Wear shoes that have rubber soles or low heels.  · Use mobility aids as needed, such as canes, walkers, scooters, and crutches.  · Review your medicines with  your health care provider. Some medicines can cause dizziness or changes in blood pressure, which increase your risk of falling.  Talk with your health care provider about other ways that you can decrease your risk of falls. This may include working with a physical therapist or  to improve your strength, balance, and endurance.  Where to find more information  · Centers for Disease Control and Prevention, STEADI: https://www.cdc.gov  · National [place] on Aging: https://dv5iahs.nasreen.nih.gov  Contact a health care provider if:  · You are afraid of falling at home.  · You feel weak, drowsy, or dizzy at home.  · You fall at home.  Summary  · There are many simple things that you can do to make your home safe and to help prevent falls.  · Ways to make your home safe include removing tripping hazards and installing grab bars in the bathroom.  · Ask for help when making these changes in your home.  This information is not intended to replace advice given to you by your health care provider. Make sure you discuss any questions you have with your health care provider.  Document Released: 2003 Document Revised: 2018 Document Reviewed: 2018  [employer] Patient Education ©  [employer]    Medicare Wellness  Personal Prevention Plan of Service     Date of Office Visit:  2020  Encounter Provider:  Ben Mae MD  Place of Service:  [facility]  Patient Name: Antony Duenas  :  1940    As part of the Medicare Wellness portion of your visit today, we are providing you with this personalized preventive plan of services (PPPS). This plan is based upon recommendations of the [place] Preventive Services Task Force (USPSTF) and the Advisory Committee on Immunization Practices (ACIP).    This lists the preventive care services that should be considered, and provides dates of when you are due. Items listed as completed are up-to-date and do not  require any further intervention.    Health Maintenance   Topic Date Due   • ZOSTER VACCINE (2 of 3) 03/09/2015   • LUNG CANCER SCREENING  04/16/2019   • MEDICARE ANNUAL WELLNESS  04/18/2020   • LIPID PANEL  08/11/2020   • INFLUENZA VACCINE  08/01/2020   • TDAP/TD VACCINES (2 - Td) 09/15/2027   • Pneumococcal Vaccine Once at 65 Years Old  Completed       No orders of the defined types were placed in this encounter.      Return in 6 months (on 2/28/2021).

## 2020-08-28 NOTE — PROGRESS NOTES
The ABCs of the Annual Wellness Visit  Subsequent Medicare Wellness Visit    Chief Complaint   Patient presents with   • Medicare Wellness-subsequent     Not fasting, (Bowl of ice cream)       Subjective   History of Present Illness:  Antony Duenas is a 80 y.o. male who presents for a Subsequent Medicare Wellness Visit.    HEALTH RISK ASSESSMENT    Recent Hospitalizations:  No hospitalization(s) within the last year.    Current Medical Providers:  Patient Care Team:  Ben Mae MD as PCP - General (Family Medicine)  Cheli Hernandez MD (Urology)  Alexander Wilhelm MD (Dermatology)  Nick Aranda MD as Consulting Physician (Cardiology)  Farrukh Almaguer MD as Consulting Physician (Hematology and Oncology)  Gisela Del Rio APRN as Referring Physician (Vascular Surgery)  Luis Sams MD as Cardiologist (Cardiology)    Smoking Status:  Social History     Tobacco Use   Smoking Status Former Smoker   • Packs/day: 1.00   • Years: 50.00   • Pack years: 50.00   • Types: Cigarettes   • Last attempt to quit: 2007   • Years since quittin.5   Smokeless Tobacco Never Used       Alcohol Consumption:  Social History     Substance and Sexual Activity   Alcohol Use Not Currently       Depression Screen:   PHQ-2/PHQ-9 Depression Screening 2019   Little interest or pleasure in doing things 0   Feeling down, depressed, or hopeless 0   Total Score 0       Fall Risk Screen:  STEADI Fall Risk Assessment has not been completed.    Health Habits and Functional and Cognitive Screening:  Functional & Cognitive Status 2019   Do you have difficulty preparing food and eating? No   Do you have difficulty bathing yourself, getting dressed or grooming yourself? No   Do you have difficulty using the toilet? No   Do you have difficulty moving around from place to place? No   Do you have trouble with steps or getting out of a bed or a chair? No   Current Diet Well Balanced Diet   Dental Exam Not up to date    Eye Exam Not up to date   Exercise (times per week) 0 times per week   Current Exercise Activities Include None   Do you need help using the phone?  No   Are you deaf or do you have serious difficulty hearing?  No   Do you need help with transportation? No   Do you need help shopping? No   Do you need help preparing meals?  No   Do you need help with housework?  No   Do you need help with laundry? No   Do you need help taking your medications? No   Do you need help managing money? No   Do you ever drive or ride in a car without wearing a seat belt? No   Have you felt unusual stress, anger or loneliness in the last month? No   Who do you live with? Spouse   If you need help, do you have trouble finding someone available to you? No   Have you been bothered in the last four weeks by sexual problems? No   Do you have difficulty concentrating, remembering or making decisions? Yes         Does the patient have evidence of cognitive impairment? Yes    Asprin use counseling:Taking ASA appropriately as indicated    Age-appropriate Screening Schedule:  Refer to the list below for future screening recommendations based on patient's age, sex and/or medical conditions. Orders for these recommended tests are listed in the plan section. The patient has been provided with a written plan.    Health Maintenance   Topic Date Due   • ZOSTER VACCINE (2 of 3) 09/06/2021 (Originally 3/9/2015)   • LIPID PANEL  08/28/2021   • TDAP/TD VACCINES (2 - Td) 09/15/2027   • INFLUENZA VACCINE  Completed          The following portions of the patient's history were reviewed and updated as appropriate: allergies, current medications, past family history, past medical history, past social history, past surgical history and problem list.    Outpatient Medications Prior to Visit   Medication Sig Dispense Refill   • Acetaminophen (TYLENOL EXTRA STRENGTH PO) Take 1 tablet by mouth As Needed (pain in legs, feet).     • aspirin 81 MG EC tablet Take 81 mg  by mouth Daily.     • atorvastatin (LIPITOR) 10 MG tablet TAKE 1 TABLET BY MOUTH EVERY NIGHT. 90 tablet 1   • Cholecalciferol (VITAMIN D3) 5000 units capsule capsule Take 5,000 Units by mouth Every Other Day.     • clopidogrel (PLAVIX) 75 MG tablet TAKE 1 TABLET BY MOUTH DAILY 90 tablet 0   • indapamide (LOZOL) 2.5 MG tablet Take 1 tablet by mouth Daily. 90 tablet 1   • LORazepam (Ativan) 1 MG tablet Take 1 tablet by mouth Every 8 (Eight) Hours As Needed for Anxiety. 90 tablet 0   • metoprolol tartrate (LOPRESSOR) 50 MG tablet Take 25 mg by mouth Daily.     • lisinopril (PRINIVIL,ZESTRIL) 20 MG tablet Take 1 tablet by mouth Daily. (Patient taking differently: Take 20 mg by mouth Daily. Patient taking every other day.) 90 tablet 2   • donepezil (ARICEPT) 10 MG tablet Take 1 tablet by mouth Every Night. 90 tablet 3   • memantine (NAMENDA) 10 MG tablet Take 1 tablet by mouth 2 (Two) Times a Day. 60 tablet 5     Facility-Administered Medications Prior to Visit   Medication Dose Route Frequency Provider Last Rate Last Dose   • cyanocobalamin injection 1,000 mcg  1,000 mcg Intramuscular Q28 Days Ben Mae MD   1,000 mcg at 01/20/20 1259       Patient Active Problem List   Diagnosis   • Cholelithiasis   • Prostate cancer (CMS/HCC)   • Iliac artery occlusion (CMS/HCC)   • Hyperlipidemia   • History of colon cancer   • Anemia   • Hypertension   • Arthritis   • PVD (peripheral vascular disease) (CMS/HCC)   • Colostomy status (CMS/HCC)   • Lymphedema of left leg   • Bilateral carotid artery stenosis   • Ex-smoker   • EATON (dyspnea on exertion)   • Preop cardiovascular exam bilateral carotid stenosis Dr Colin   • Carotid stenosis       Advanced Care Planning:  ACP discussion was declined by the patient. Patient does not have an advance directive, declines further assistance.    Review of Systems   Respiratory: Negative for shortness of breath.    Cardiovascular: Positive for leg swelling. Negative for chest pain.  "  Gastrointestinal:        Colostomy left lower quadrant stable   Genitourinary:        History of prostatic CA   Skin:        Recent skin tears both elbows   Neurological:        Moderate to severe dementia       Compared to one year ago, the patient feels his physical health is worse.  Compared to one year ago, the patient feels his mental health is worse.    Reviewed chart for potential of high risk medication in the elderly: not applicable  Reviewed chart for potential of harmful drug interactions in the elderly:not applicable    Objective         Vitals:    08/28/20 1003   BP: 104/50   BP Location: Left arm   Patient Position: Sitting   Cuff Size: Adult   Pulse: 95   Resp: 16   Temp: 97.5 °F (36.4 °C)   TempSrc: Temporal   SpO2: 92%   Weight: 83.5 kg (184 lb)   Height: 172.7 cm (68\")   PainSc:   6       Body mass index is 27.98 kg/m².  Discussed the patient's BMI with him. The BMI is above average; no BMI management plan is appropriate..    Physical Exam   Constitutional:   Slightly overweight   HENT:   Right Ear: External ear normal.   Left Ear: External ear normal.   Eyes: Pupils are equal, round, and reactive to light. EOM are normal.   Neck: No thyromegaly present.   Carotid pulses present   Cardiovascular: Normal rate and regular rhythm.   Pulmonary/Chest: Effort normal and breath sounds normal.   Abdominal: Soft. Bowel sounds are normal.   Genitourinary:   Genitourinary Comments: Deferred   Musculoskeletal: He exhibits edema.   Severe edema-lymphedema and lower extremity venous insufficiency   Lymphadenopathy:     He has no cervical adenopathy.   Neurological: He is alert.   Not oriented   Skin: Skin is warm and dry. Capillary refill takes less than 2 seconds.   Psychiatric:   Cognitively minimal deterioration-no behavioral problems at this time   Nursing note and vitals reviewed.            Assessment/Plan   Medicare Risks and Personalized Health Plan  CMS Preventative Services Quick Reference  Fall " Risk    The above risks/problems have been discussed with the patient.  Pertinent information has been shared with the patient in the After Visit Summary.  Follow up plans and orders are seen below in the Assessment/Plan Section.    Diagnoses and all orders for this visit:    1. Fatigue, unspecified type (Primary)  -     TSH  -     T4, free  -     CBC & Differential    2. Hypertension, unspecified type  -     POC Urinalysis Dipstick, Multipro    3. Mixed hyperlipidemia  -     Comprehensive Metabolic Panel  -     Lipid Panel    4. B12 deficiency  -     Vitamin B12  -     Folate    5. Need for prophylactic vaccination and inoculation against influenza  -     Fluad Quad >65 years    6. Annual physical exam    7. Need for prophylactic vaccination against Streptococcus pneumoniae (pneumococcus)  -     Pneumococcal Polysaccharide Vaccine 23-Valent Greater Than or Equal To 1yo Subcutaneous / IM      Follow Up:  Return in 6 months (on 2/28/2021).     An After Visit Summary and PPPS were given to the patient.       Plan above-nursing home placement advised for lymphedema treatment, balance and gait training, physical therapy, DNR

## 2020-09-10 NOTE — PROGRESS NOTES
I saw Antony Duenas at the [facility] on September 10, 2020.  He has a history of dementia, colon and prostate cancer.  He has a permanent colostomy.  He was alert responsive and seemed to recognize me.  He had no complaints.    On physical his rhythm was regular chest clear abdomen soft with a colostomy that seemed to be functioning normally.  The edema in his legs from chronic venous insufficiency and peripheral vascular disease was improved.    The patient is continue rehab at [facility]

## 2020-10-08 NOTE — PROGRESS NOTES
I saw Antony Duenas at the [facility] on October 8, 2020.  He has a history of severe dementia prostate and colon cancer, peripheral vascular disease and lower extremity venous insufficiency.  He is as stable as he can be.  Behavioral problems are present but we did trying not over medicate him.  His heart rhythm is regular chest is clear and the edema has improved.    Will continue comfort measures and take care of hygiene nutrition and pain control    Sincerely Reid Mae MD

## 2020-11-12 NOTE — PROGRESS NOTES
The patient has used compression 20-30 mmHg, elevation, and exercise along with basic lymphedema pump.  Despite this, he has continued to have increased swelling in truncal and abdominal edema.  He needs a Flexitouch.      11/12/2020  TAB Perez

## 2020-11-16 NOTE — TELEPHONE ENCOUNTER
"Edilma at [facility] is calling for Dr. Mae.  Dr. Mae contacted and conferenced with Edilma.  Reason for Disposition  • [1] Caller requests to speak ONLY to PCP AND [2] URGENT question    Additional Information  • Negative: Lab calling with strep throat test results and triager can call in prescription  • Negative: Lab calling with urinalysis test results and triager can call in prescription  • Negative: Medication questions  • Negative: ED call to PCP  • Negative: Physician call to PCP  • Negative: Call about patient who is currently hospitalized  • Negative: Lab or radiology calling with CRITICAL test results  • Negative: [1] Prescription not at pharmacy AND [2] was prescribed today by PCP  • Negative: [1] Follow-up call from patient regarding patient's clinical status AND [2] information urgent    Answer Assessment - Initial Assessment Questions  1. REASON FOR CALL or QUESTION: \"What is your reason for calling today?\" or \"How can I best  help you?\" or \"What question do you have that I can help answer?\"      I need to speak with Dr. Mae about Mr. Duenas  2. CALLER: Document the source of call. (e.g., laboratory, patient).      Nursing Home nurse    Protocols used: PCP CALL - NO TRIAGE-ADULT-AH    "

## 2022-03-27 NOTE — TELEPHONE ENCOUNTER
Ragini returned call and was advised.  She verbalized understanding   This RN unable to obtain VS at this time, patient sleeping.

## 2023-02-14 NOTE — ANESTHESIA PREPROCEDURE EVALUATION
Anesthesia Evaluation     Patient summary reviewed and Nursing notes reviewed   no history of anesthetic complications:  NPO Solid Status: > 8 hours  NPO Liquid Status: > 8 hours           Airway   Mallampati: II  TM distance: >3 FB  Neck ROM: full  No difficulty expected  Dental      Pulmonary    (+) a smoker Former, shortness of breath,   Cardiovascular   Exercise tolerance: poor (<4 METS)    Patient on routine beta blocker and Beta blocker given within 24 hours of surgery    (+) hypertension, EATON, PVD, hyperlipidemia,  carotid artery disease carotid bilateral    ROS comment: Low risk stress test  Echo  · Estimated EF = 60%.  · Left ventricular systolic function is normal.  · Left ventricular diastolic dysfunction.  · Left ventricular wall thickness is consistent with mild concentric hypertrophy.    Neuro/Psych  (+) dementia,     GI/Hepatic/Renal/Endo      Musculoskeletal     Abdominal    Substance History      OB/GYN          Other   arthritis,    history of cancer (prostate, colon cancer, colostomy in place) remission                    Anesthesia Plan    ASA 3     general     intravenous induction     Anesthetic plan, all risks, benefits, and alternatives have been provided, discussed and informed consent has been obtained with: patient.       Fluconazole Counseling:  Patient counseled regarding adverse effects of fluconazole including but not limited to headache, diarrhea, nausea, upset stomach, liver function test abnormalities, taste disturbance, and stomach pain.  There is a rare possibility of liver failure that can occur when taking fluconazole.  The patient understands that monitoring of LFTs and kidney function test may be required, especially at baseline. The patient verbalized understanding of the proper use and possible adverse effects of fluconazole.  All of the patient's questions and concerns were addressed.

## (undated) DEVICE — BALN EVERCROSS OTW .035 5F 6X60MM 80CM

## (undated) DEVICE — SYR TB PRECISIONGLIDE 1CC 26G 3/8IN LF

## (undated) DEVICE — RESERVOIR,SUCTION,100CC,SILICONE: Brand: MEDLINE

## (undated) DEVICE — CANN VESL ACRN TP 4MM

## (undated) DEVICE — PROXIMATE RH ROTATING HEAD SKIN STAPLERS (35 WIDE) CONTAINS 35 STAINLESS STEEL STAPLES: Brand: PROXIMATE

## (undated) DEVICE — GLV SURG SENSICARE W/ALOE PF LF 7.5 STRL

## (undated) DEVICE — NDL HYPO PRECISIONGLIDE REG 25G 1 1/2

## (undated) DEVICE — PAD MAJOR VASCULAR: Brand: MEDLINE INDUSTRIES, INC.

## (undated) DEVICE — SUT PROLN 5/0 C1 DA 24IN 8725H

## (undated) DEVICE — SUT ETHLN 3/0 FS1 30IN 669H

## (undated) DEVICE — 5F 80 CM LEMAITRE EMBOLECTOMY CATHETER, EIFU: Brand: LEMAITRE EMBOLECTOMY CATHETER

## (undated) DEVICE — SUT PROLN 6/0 4/24IN BV1 MON BL M8805

## (undated) DEVICE — ANTIBACTERIAL UNDYED BRAIDED (POLYGLACTIN 910), SYNTHETIC ABSORBABLE SUTURE: Brand: COATED VICRYL

## (undated) DEVICE — 4F 80 CM LEMAITRE EMBOLECTOMY CATHETER, EIFU: Brand: LEMAITRE EMBOLECTOMY CATHETER

## (undated) DEVICE — SUT MNCRYL 4/0 PS2 27IN UD MCP426H

## (undated) DEVICE — SPNG GZ STRL 2S 4X4 12PLY

## (undated) DEVICE — Device

## (undated) DEVICE — WIPE MEROCEL 3.625X3IN

## (undated) DEVICE — SHEET,DRAPE,53X77,STERILE: Brand: MEDLINE

## (undated) DEVICE — CLTH CLENS READYCLEANSE PERI CARE PK/5

## (undated) DEVICE — PK TURNOVER RM ADV

## (undated) DEVICE — GAUZE,SPONGE,4"X4",16PLY,XRAY,STRL,LF: Brand: MEDLINE

## (undated) DEVICE — DRSNG SURESITE WNDW 4X4.5

## (undated) DEVICE — SYR CONTRL LUERLOK 10CC

## (undated) DEVICE — WIPE INST MEROCEL

## (undated) DEVICE — SUT SILK 2/0 SH 30IN K833H

## (undated) DEVICE — APPL CHLORAPREP W/TINT 26ML ORNG

## (undated) DEVICE — 3M™ STERI-STRIP™ REINFORCED ADHESIVE SKIN CLOSURES, R1547, 1/2 IN X 4 IN (12 MM X 100 MM), 6 STRIPS/ENVELOPE: Brand: 3M™ STERI-STRIP™

## (undated) DEVICE — SUT PROLN 7/0 BV1 24IN 4PK M8702

## (undated) DEVICE — 3M™ IOBAN™ 2 ANTIMICROBIAL INCISE DRAPE 6650EZ: Brand: IOBAN™ 2

## (undated) DEVICE — TOTAL TRAY, 16FR 10ML SIL FOLEY, URN: Brand: MEDLINE

## (undated) DEVICE — NDL HYPO PRECISIONGLIDE REG 22G 1 1/2

## (undated) DEVICE — SNAP KOVER: Brand: UNBRANDED